# Patient Record
Sex: MALE | Race: WHITE | NOT HISPANIC OR LATINO | Employment: FULL TIME | URBAN - METROPOLITAN AREA
[De-identification: names, ages, dates, MRNs, and addresses within clinical notes are randomized per-mention and may not be internally consistent; named-entity substitution may affect disease eponyms.]

---

## 2017-01-17 ENCOUNTER — OFFICE VISIT (OUTPATIENT)
Dept: MEDICAL GROUP | Facility: CLINIC | Age: 42
End: 2017-01-17
Payer: COMMERCIAL

## 2017-01-17 VITALS
BODY MASS INDEX: 33.33 KG/M2 | TEMPERATURE: 98.5 F | OXYGEN SATURATION: 98 % | SYSTOLIC BLOOD PRESSURE: 150 MMHG | RESPIRATION RATE: 18 BRPM | HEART RATE: 86 BPM | HEIGHT: 69 IN | WEIGHT: 225 LBS | DIASTOLIC BLOOD PRESSURE: 82 MMHG

## 2017-01-17 DIAGNOSIS — Z23 NEED FOR INFLUENZA VACCINATION: ICD-10-CM

## 2017-01-17 DIAGNOSIS — E66.9 OBESITY (BMI 30-39.9): ICD-10-CM

## 2017-01-17 DIAGNOSIS — E78.5 HYPERLIPIDEMIA, UNSPECIFIED HYPERLIPIDEMIA TYPE: ICD-10-CM

## 2017-01-17 PROCEDURE — 90471 IMMUNIZATION ADMIN: CPT | Performed by: NURSE PRACTITIONER

## 2017-01-17 PROCEDURE — 90686 IIV4 VACC NO PRSV 0.5 ML IM: CPT | Performed by: NURSE PRACTITIONER

## 2017-01-17 PROCEDURE — 99214 OFFICE O/P EST MOD 30 MIN: CPT | Mod: 25 | Performed by: NURSE PRACTITIONER

## 2017-01-17 ASSESSMENT — PATIENT HEALTH QUESTIONNAIRE - PHQ9: CLINICAL INTERPRETATION OF PHQ2 SCORE: 0

## 2017-01-18 NOTE — PROGRESS NOTES
"CC: Annual Exam        HPI:     Danilo presents today for the followin. Hyperlipidemia, unspecified hyperlipidemia type  High cholesterol. No history CAD chest pains. He is overdue for labs.    2. Elevated BP  Does have elevated blood pressure today. Doesn't check it at home. Doesn't have any symptoms or concerns related to that. He has gained a little weight since his last appointment and he attributes it to that. Doesn't have excessive caffeine intake drinks one or 2 coffees maybe a day maximum.    3. Obesity (BMI 30-39.9)  Above ideal weight for height. Did gain maybe 7 pounds over the last year. Does feel his caloric intake has increased since changed jobs    4. Need for influenza vaccination      Current Outpatient Prescriptions   Medication Sig Dispense Refill   • famotidine (PEPCID) 20 MG Tab Take 20 mg by mouth 2 times a day.     • zolpidem (AMBIEN) 5 MG Tab Take 1 Tab by mouth at bedtime as needed for Sleep. 30 Tab 2   • fluticasone (FLONASE) 50 MCG/ACT nasal spray Spray 2 Sprays in nose every day. 1 Bottle 11   • Multiple Vitamins-Minerals (MULTIVITAMIN PO) Take  by mouth.       No current facility-administered medications for this visit.     Social History   Substance Use Topics   • Smoking status: Never Smoker    • Smokeless tobacco: Never Used   • Alcohol Use: 4.2 oz/week     7 Cans of beer per week     I reviewed patients allergies, problem list and medications today in EPIC.    ROS: Any/all pertinent positives listed in the HPI, otherwise all others reviewed are negative today.      /82 mmHg  Pulse 86  Temp(Src) 36.9 °C (98.5 °F)  Resp 18  Ht 1.753 m (5' 9.02\")  Wt 102.059 kg (225 lb)  BMI 33.21 kg/m2  SpO2 98%    Exam:   Gen: Alert and oriented, No apparent distress. WDWN  Psych: A+Ox3, normal affect and mood  Skin: Warm, dry and intact. Good turgor   No rashes in visible areas.  Eye: Conjunctiva clear, lids normal  ENMT: Lips without lesions, good dentition  Neck: No " Lymphadenopathy, Thyromegaly, Bruits.   Trachea midline, no masses  Lungs: Clear to auscultation bilaterally, no rales or rhonchi   Unlabored respiratory effort.   CV: Regular rate and rhythm, S1, S2. No murmurs.   No Edema  Abd: Soft non tender, non distended. Normal active bowel sounds.    No Hepatosplenomegaly, No pulsatile masses.   Ext: No clubbing, cyanosis, edema.       Assessment and Plan.   41 y.o. male with the following issues.    1. Hyperlipidemia, unspecified hyperlipidemia type  Stable. Check labs. We did have very long detailed conversation about diet  - LIPID PROFILE; Future    2. Elevated BP  Stable. Reduce salt. Increase potassium. Moderate is caffeine intake as able. He will monitor his blood pressure at home. He is given a bur a cuff. He'll follow-up in the office if it remains elevated  - COMP METABOLIC PANEL; Future  - TSH; Future  - CBC WITH DIFFERENTIAL; Future    3. Obesity (BMI 30-39.9)  Again long discussion about diet and exercise  - Patient identified as having weight management issue.  Appropriate orders and counseling given.    4. Need for influenza vaccination  I have placed the below orders and discussed them with an approved delegating provider. The MA is performing the below orders under the direction of an office MD.  -Given today.    - INFLUENZA VACCINE QUAD INJ >3Y(PF)

## 2017-03-06 RX ORDER — ZOLPIDEM TARTRATE 5 MG/1
TABLET ORAL
Qty: 30 TAB | Refills: 2 | Status: SHIPPED
Start: 2017-03-06 | End: 2017-09-06 | Stop reason: SDUPTHER

## 2017-09-08 RX ORDER — ZOLPIDEM TARTRATE 5 MG/1
TABLET ORAL
Qty: 30 TAB | Refills: 0 | Status: SHIPPED
Start: 2017-09-08 | End: 2018-09-11

## 2017-09-08 NOTE — TELEPHONE ENCOUNTER
Please call patient to notify that he has to be seen for further refills due to [facility] policy.  Last appt 01/2017     reviewed from state pharmacy database-Medications found to be medically necessary/appropriate.

## 2018-08-10 ENCOUNTER — OFFICE VISIT (OUTPATIENT)
Dept: URGENT CARE | Facility: CLINIC | Age: 43
End: 2018-08-10
Payer: COMMERCIAL

## 2018-08-10 VITALS
OXYGEN SATURATION: 96 % | WEIGHT: 221 LBS | DIASTOLIC BLOOD PRESSURE: 88 MMHG | HEART RATE: 74 BPM | SYSTOLIC BLOOD PRESSURE: 144 MMHG | BODY MASS INDEX: 32.73 KG/M2 | TEMPERATURE: 98.3 F | HEIGHT: 69 IN | RESPIRATION RATE: 14 BRPM

## 2018-08-10 DIAGNOSIS — S61.032A PUNCTURE WOUND OF LEFT THUMB, INITIAL ENCOUNTER: ICD-10-CM

## 2018-08-10 DIAGNOSIS — Z23 NEED FOR TETANUS BOOSTER: ICD-10-CM

## 2018-08-10 PROCEDURE — 99214 OFFICE O/P EST MOD 30 MIN: CPT | Mod: 25 | Performed by: NURSE PRACTITIONER

## 2018-08-10 PROCEDURE — 90715 TDAP VACCINE 7 YRS/> IM: CPT | Performed by: NURSE PRACTITIONER

## 2018-08-10 PROCEDURE — 90471 IMMUNIZATION ADMIN: CPT | Performed by: NURSE PRACTITIONER

## 2018-08-10 ASSESSMENT — ENCOUNTER SYMPTOMS
ROS SKIN COMMENTS: PUNCTURE WOUND
SENSORY CHANGE: 0
FEVER: 0
TINGLING: 0
FOCAL WEAKNESS: 0
CHILLS: 0

## 2018-08-10 NOTE — PROGRESS NOTES
"Subjective:      Danilo Sierra is a 43 y.o. male who presents with Puncture Wound (x1day, left thumb puncture wound with sunil nail)            HPI New problem. 43 year old male with left thumb wound from sunil nail puncture yesterday. He denies any pain or numbness/tingling. No active bleeding. Last [id number] ago in 2011. No other issues to report.  Patient has no known allergies.  Current Outpatient Prescriptions on File Prior to Visit   Medication Sig Dispense Refill   • Multiple Vitamins-Minerals (MULTIVITAMIN PO) Take  by mouth.     • zolpidem (AMBIEN) 5 MG Tab TAKE 1 TABLET BY MOUTH DAILY AT BEDTIME AS NEEDED FOR SLEEP 30 Tab 0   • famotidine (PEPCID) 20 MG Tab Take 20 mg by mouth 2 times a day.     • fluticasone (FLONASE) 50 MCG/ACT nasal spray Spray 2 Sprays in nose every day. 1 Bottle 11     No current facility-administered medications on file prior to visit.      Social History     Social History   • Marital status: Single     Spouse name: N/A   • Number of children: N/A   • Years of education: N/A     Occupational History   • [employer]     Social History Main Topics   • Smoking status: Never Smoker   • Smokeless tobacco: Never Used   • Alcohol use 4.2 oz/week     7 Cans of beer per week   • Drug use: No   • Sexual activity: Yes     Partners: Female      Comment: Living with girlfriend     Other Topics Concern   • Not on file     Social History Narrative   • No narrative on file     family history includes Cancer in his maternal grandmother.    Review of Systems   Constitutional: Negative for chills and fever.   Skin:        Puncture wound   Neurological: Negative for tingling, sensory change and focal weakness.          Objective:     /88   Pulse 74   Temp 36.8 °C (98.3 °F)   Resp 14   Ht 1.753 m (5' 9\")   Wt 100.2 kg (221 lb)   SpO2 96%   BMI 32.64 kg/m²      Physical Exam   Constitutional: He is oriented to person, place, and time. He appears well-developed and " well-nourished. No distress.   Cardiovascular: Normal rate, regular rhythm and normal heart sounds.    No murmur heard.  Pulmonary/Chest: Effort normal and breath sounds normal. No respiratory distress.   Musculoskeletal: Normal range of motion.   Neurological: He is alert and oriented to person, place, and time.   Skin: Skin is warm and dry.   Small wound to pad of left thumb. No active bleeding or bruising, swelling noted. Mildly tender.   Psychiatric: He has a normal mood and affect. His behavior is normal. Thought content normal.   Nursing note and vitals reviewed.              Assessment/Plan:     1. Puncture wound of left thumb, initial encounter     2. Need for tetanus booster  Tdap =>8yo IM     Differential diagnosis, natural history, supportive care, and indications for immediate follow-up discussed at length.

## 2018-09-11 ENCOUNTER — OFFICE VISIT (OUTPATIENT)
Dept: MEDICAL GROUP | Facility: MEDICAL CENTER | Age: 43
End: 2018-09-11
Payer: COMMERCIAL

## 2018-09-11 ENCOUNTER — HOSPITAL ENCOUNTER (OUTPATIENT)
Dept: LAB | Facility: MEDICAL CENTER | Age: 43
End: 2018-09-11
Attending: NURSE PRACTITIONER
Payer: COMMERCIAL

## 2018-09-11 VITALS
RESPIRATION RATE: 14 BRPM | TEMPERATURE: 98.2 F | SYSTOLIC BLOOD PRESSURE: 118 MMHG | OXYGEN SATURATION: 96 % | WEIGHT: 221 LBS | BODY MASS INDEX: 32.73 KG/M2 | DIASTOLIC BLOOD PRESSURE: 78 MMHG | HEIGHT: 69 IN | HEART RATE: 78 BPM

## 2018-09-11 DIAGNOSIS — Z23 NEED FOR VACCINATION: ICD-10-CM

## 2018-09-11 DIAGNOSIS — Z13.29 SCREENING FOR THYROID DISORDER: ICD-10-CM

## 2018-09-11 DIAGNOSIS — Z30.09 UNWANTED FERTILITY: ICD-10-CM

## 2018-09-11 DIAGNOSIS — E66.9 OBESITY (BMI 30-39.9): ICD-10-CM

## 2018-09-11 DIAGNOSIS — E78.5 HYPERLIPIDEMIA, UNSPECIFIED HYPERLIPIDEMIA TYPE: ICD-10-CM

## 2018-09-11 LAB
ALBUMIN SERPL BCP-MCNC: 4.6 G/DL (ref 3.2–4.9)
ALBUMIN/GLOB SERPL: 1.4 G/DL
ALP SERPL-CCNC: 87 U/L (ref 30–99)
ALT SERPL-CCNC: 46 U/L (ref 2–50)
ANION GAP SERPL CALC-SCNC: 7 MMOL/L (ref 0–11.9)
AST SERPL-CCNC: 31 U/L (ref 12–45)
BILIRUB SERPL-MCNC: 1.4 MG/DL (ref 0.1–1.5)
BUN SERPL-MCNC: 19 MG/DL (ref 8–22)
CALCIUM SERPL-MCNC: 10.3 MG/DL (ref 8.5–10.5)
CHLORIDE SERPL-SCNC: 103 MMOL/L (ref 96–112)
CHOLEST SERPL-MCNC: 244 MG/DL (ref 100–199)
CO2 SERPL-SCNC: 28 MMOL/L (ref 20–33)
CREAT SERPL-MCNC: 1.17 MG/DL (ref 0.5–1.4)
FASTING STATUS PATIENT QL REPORTED: NORMAL
GLOBULIN SER CALC-MCNC: 3.3 G/DL (ref 1.9–3.5)
GLUCOSE SERPL-MCNC: 96 MG/DL (ref 65–99)
HDLC SERPL-MCNC: 42 MG/DL
LDLC SERPL CALC-MCNC: 172 MG/DL
POTASSIUM SERPL-SCNC: 4.4 MMOL/L (ref 3.6–5.5)
PROT SERPL-MCNC: 7.9 G/DL (ref 6–8.2)
SODIUM SERPL-SCNC: 138 MMOL/L (ref 135–145)
TRIGL SERPL-MCNC: 150 MG/DL (ref 0–149)
TSH SERPL DL<=0.005 MIU/L-ACNC: 2.96 UIU/ML (ref 0.38–5.33)

## 2018-09-11 PROCEDURE — 80061 LIPID PANEL: CPT

## 2018-09-11 PROCEDURE — 84443 ASSAY THYROID STIM HORMONE: CPT

## 2018-09-11 PROCEDURE — 90471 IMMUNIZATION ADMIN: CPT | Performed by: NURSE PRACTITIONER

## 2018-09-11 PROCEDURE — 99396 PREV VISIT EST AGE 40-64: CPT | Mod: 25 | Performed by: NURSE PRACTITIONER

## 2018-09-11 PROCEDURE — 36415 COLL VENOUS BLD VENIPUNCTURE: CPT

## 2018-09-11 PROCEDURE — 80053 COMPREHEN METABOLIC PANEL: CPT

## 2018-09-11 PROCEDURE — 90686 IIV4 VACC NO PRSV 0.5 ML IM: CPT | Performed by: NURSE PRACTITIONER

## 2018-09-11 ASSESSMENT — PATIENT HEALTH QUESTIONNAIRE - PHQ9: CLINICAL INTERPRETATION OF PHQ2 SCORE: 0

## 2018-09-11 NOTE — PROGRESS NOTES
"CC: Sterilization (discussion /referral)        HPI:     Danilo presents today for the followin. Hyperlipidemia, unspecified hyperlipidemia type  History of some mild cholesterol issues.  No previous records on file.  It has been some years since it was checked.    2. Unwanted fertility  Desiring referral for vasectomy.    3. Obesity (BMI 30-39.9)  Weight is down    Current Outpatient Prescriptions   Medication Sig Dispense Refill   • Loratadine (CLARITIN PO) Take  by mouth.     • famotidine (PEPCID) 20 MG Tab Take 20 mg by mouth 2 times a day.     • Multiple Vitamins-Minerals (MULTIVITAMIN PO) Take  by mouth.       No current facility-administered medications for this visit.      Social History   Substance Use Topics   • Smoking status: Never Smoker   • Smokeless tobacco: Never Used   • Alcohol use 4.2 oz/week     7 Cans of beer per week     I reviewed patients allergies, problem list and medications today in EPIC.    ROS: Any/all pertinent positives listed in the HPI, otherwise all others reviewed are negative today.      /78   Pulse 78   Temp 36.8 °C (98.2 °F)   Resp 14   Ht 1.753 m (5' 9\")   Wt 100.2 kg (221 lb)   SpO2 96%   BMI 32.64 kg/m²     Exam:    Gen: Alert and oriented, No apparent distress. WDWN  Psych: A+Ox3, normal affect and mood  Skin: Warm, dry and intact. Good turgor   No rashes in visible areas.  Eye: Conjunctiva clear, lids normal  ENMT: Lips without lesions, good dentition  Neck: No Lymphadenopathy, Thyromegaly, Bruits.   Trachea midline, no masses  Lungs: Clear to auscultation bilaterally, no rales or rhonchi   Unlabored respiratory effort.   CV: Regular rate and rhythm, S1, S2. No murmurs.   No Edema  Ext: No clubbing, cyanosis, edema.       Assessment and Plan.   43 y.o. male with the following issues.    1. Hyperlipidemia, unspecified hyperlipidemia type  Monitor labs  - COMP METABOLIC PANEL; Future  - LIPID PROFILE; Future    2. Unwanted fertility  Referral placed  - " REFERRAL TO UROLOGY    3. Screening for thyroid disorder  Ordered  - TSH; Future    4. Need for vaccination  I have placed the below orders and discussed them with an approved delegating provider. The MA is performing the below orders under the direction of an office MD.  -Given today.  - INFLUENZA VACCINE QUAD INJ >3Y(PF)    5. Obesity (BMI 30-39.9)  Patient's weight is currently down  - Patient identified as having weight management issue.  Appropriate orders and counseling given.

## 2019-11-25 ENCOUNTER — OFFICE VISIT (OUTPATIENT)
Dept: MEDICAL GROUP | Facility: MEDICAL CENTER | Age: 44
End: 2019-11-25
Payer: COMMERCIAL

## 2019-11-25 VITALS
TEMPERATURE: 98.7 F | HEART RATE: 85 BPM | BODY MASS INDEX: 31.21 KG/M2 | OXYGEN SATURATION: 97 % | HEIGHT: 70 IN | SYSTOLIC BLOOD PRESSURE: 128 MMHG | RESPIRATION RATE: 16 BRPM | WEIGHT: 218 LBS | DIASTOLIC BLOOD PRESSURE: 80 MMHG

## 2019-11-25 DIAGNOSIS — E78.5 HYPERLIPIDEMIA, UNSPECIFIED HYPERLIPIDEMIA TYPE: ICD-10-CM

## 2019-11-25 DIAGNOSIS — R68.89 EAR SYMPTOM: ICD-10-CM

## 2019-11-25 DIAGNOSIS — K21.9 GASTROESOPHAGEAL REFLUX DISEASE, ESOPHAGITIS PRESENCE NOT SPECIFIED: ICD-10-CM

## 2019-11-25 DIAGNOSIS — E66.9 OBESITY (BMI 30-39.9): ICD-10-CM

## 2019-11-25 PROCEDURE — 99214 OFFICE O/P EST MOD 30 MIN: CPT | Performed by: NURSE PRACTITIONER

## 2019-11-25 RX ORDER — INFLUENZA VIRUS VACCINE 15; 15; 15; 15 UG/.5ML; UG/.5ML; UG/.5ML; UG/.5ML
SUSPENSION INTRAMUSCULAR
Refills: 0 | COMMUNITY
Start: 2019-11-20 | End: 2019-11-25

## 2019-11-25 RX ORDER — FAMOTIDINE 40 MG/1
40 TABLET, FILM COATED ORAL
Qty: 90 TAB | Refills: 1 | Status: SHIPPED | OUTPATIENT
Start: 2019-11-25 | End: 2020-03-12

## 2019-11-25 ASSESSMENT — PATIENT HEALTH QUESTIONNAIRE - PHQ9: CLINICAL INTERPRETATION OF PHQ2 SCORE: 0

## 2019-11-25 NOTE — PROGRESS NOTES
"CC: GI Problem        HPI:     Danilo presents today for the followin. Hyperlipidemia, unspecified hyperlipidemia type  Last seen about 14 or 15 months ago.  We did do some labs which show some high cholesterol with an LDL of approximately 175 at that time.  To his knowledge he does not have any family history of high cholesterol or heart disease however he admits he does not know anything about his father's side of the family as he  when he was 4.  Does eat a large amount of red meat.  Has been trying to decrease sugar from his diet.    2. Gastroesophageal reflux disease, esophagitis presence not specified  Here primarily today to discuss heartburn and GERD.  States is been bothering him and even if he \"drinks water\" he will still have discomfort and reflux.  On a rare occasional have some dysphasia typically with meat or bread will take a few minutes for it to feel like it \"releases\" and the food moves down into his stomach.  No nausea or vomiting.  Currently is doing some Prilosec over-the-counter which is working well    3. Obesity (BMI 30-39.9)  Weight is down    4.  Ear symptom  States that on rare occasions over the last 1 to 2 months he will feel like he can hear his \"heart rate\" in his right ear.  Last seconds only.  Not daily.  No other associated symptom    Current Outpatient Medications   Medication Sig Dispense Refill   • famotidine (PEPCID) 40 MG Tab Take 1 Tab by mouth every morning before breakfast. 90 Tab 1   • Loratadine (CLARITIN PO) Take  by mouth.     • Multiple Vitamins-Minerals (MULTIVITAMIN PO) Take  by mouth.       No current facility-administered medications for this visit.      Social History     Tobacco Use   • Smoking status: Never Smoker   • Smokeless tobacco: Never Used   Substance Use Topics   • Alcohol use: Yes     Alcohol/week: 4.2 oz     Types: 7 Cans of beer per week   • Drug use: No     I reviewed patients allergies, problem list and medications today in " "EPIC.    ROS: Any/all pertinent positives listed in the HPI, otherwise all others reviewed are negative today.      /80 (BP Location: Left arm)   Pulse 85   Temp 37.1 °C (98.7 °F)   Resp 16   Ht 1.778 m (5' 10\")   Wt 98.9 kg (218 lb)   SpO2 97%   BMI 31.28 kg/m²     Exam:    Gen: Alert and oriented, No apparent distress. WDWN  Psych: A+Ox3, normal affect and mood  Skin: Warm, dry and intact. Good turgor   No rashes in visible areas.  Eye: Conjunctiva clear, lids normal  ENMT: Lips without lesions, good dentition  Neck: No Lymphadenopathy, Thyromegaly, Bruits.   Trachea midline, no masses  Lungs: Clear to auscultation bilaterally, no rales or rhonchi   Unlabored respiratory effort.   CV: Regular rate and rhythm, S1, S2. No murmurs.   No Edema        Assessment and Plan.   44 y.o. male with the following issues.    1. Hyperlipidemia, unspecified hyperlipidemia type  Stable.  Patient will focus on diet for the next 2 to 3 months, labs around that time and if his LDL is still above 140 we will add in medication.  - Comp Metabolic Panel; Future  - Lipid Profile; Future    2. Gastroesophageal reflux disease, esophagitis presence not specified  Trial of Pepcid.  We did discuss foods to admit from his diet.  Discussed take the medication on empty stomach in the morning.  If not getting good improvement with this we will switch back to a PPI.  If dysphagia continues we will have him see GI for an endoscopy  - famotidine (PEPCID) 40 MG Tab; Take 1 Tab by mouth every morning before breakfast.  Dispense: 90 Tab; Refill: 1    3. Obesity (BMI 30-39.9)  Weight is down.  We discussed diet today  - OBESITY COUNSELING (No Charge): Patient identified as having weight management issue.  Appropriate orders and counseling given.      4.  Ear symptom  We will continue to monitor this.  We may consider imaging if needed    "

## 2020-03-09 ENCOUNTER — HOSPITAL ENCOUNTER (OUTPATIENT)
Dept: LAB | Facility: MEDICAL CENTER | Age: 45
End: 2020-03-09
Attending: NURSE PRACTITIONER
Payer: COMMERCIAL

## 2020-03-09 DIAGNOSIS — E78.5 HYPERLIPIDEMIA, UNSPECIFIED HYPERLIPIDEMIA TYPE: ICD-10-CM

## 2020-03-09 PROCEDURE — 80053 COMPREHEN METABOLIC PANEL: CPT

## 2020-03-09 PROCEDURE — 36415 COLL VENOUS BLD VENIPUNCTURE: CPT

## 2020-03-09 PROCEDURE — 80061 LIPID PANEL: CPT

## 2020-03-10 LAB
ALBUMIN SERPL BCP-MCNC: 4.1 G/DL (ref 3.2–4.9)
ALBUMIN/GLOB SERPL: 1.1 G/DL
ALP SERPL-CCNC: 64 U/L (ref 30–99)
ALT SERPL-CCNC: 31 U/L (ref 2–50)
ANION GAP SERPL CALC-SCNC: 9 MMOL/L (ref 0–11.9)
AST SERPL-CCNC: 22 U/L (ref 12–45)
BILIRUB SERPL-MCNC: 0.4 MG/DL (ref 0.1–1.5)
BUN SERPL-MCNC: 21 MG/DL (ref 8–22)
CALCIUM SERPL-MCNC: 9.8 MG/DL (ref 8.5–10.5)
CHLORIDE SERPL-SCNC: 108 MMOL/L (ref 96–112)
CHOLEST SERPL-MCNC: 292 MG/DL (ref 100–199)
CO2 SERPL-SCNC: 24 MMOL/L (ref 20–33)
CREAT SERPL-MCNC: 1.23 MG/DL (ref 0.5–1.4)
FASTING STATUS PATIENT QL REPORTED: NORMAL
GLOBULIN SER CALC-MCNC: 3.6 G/DL (ref 1.9–3.5)
GLUCOSE SERPL-MCNC: 109 MG/DL (ref 65–99)
HDLC SERPL-MCNC: 24 MG/DL
LDLC SERPL CALC-MCNC: 236 MG/DL
POTASSIUM SERPL-SCNC: 4.9 MMOL/L (ref 3.6–5.5)
PROT SERPL-MCNC: 7.7 G/DL (ref 6–8.2)
SODIUM SERPL-SCNC: 141 MMOL/L (ref 135–145)
TRIGL SERPL-MCNC: 160 MG/DL (ref 0–149)

## 2020-03-11 ENCOUNTER — TELEPHONE (OUTPATIENT)
Dept: MEDICAL GROUP | Facility: MEDICAL CENTER | Age: 45
End: 2020-03-11

## 2020-03-11 DIAGNOSIS — E78.5 HYPERLIPIDEMIA, UNSPECIFIED HYPERLIPIDEMIA TYPE: ICD-10-CM

## 2020-03-11 RX ORDER — ATORVASTATIN CALCIUM 20 MG/1
20 TABLET, FILM COATED ORAL DAILY
Qty: 90 TAB | Refills: 1 | Status: SHIPPED | OUTPATIENT
Start: 2020-03-11 | End: 2020-09-03

## 2020-03-11 NOTE — TELEPHONE ENCOUNTER
Please call patient.  His cholesterol did not improve and is actually significantly higher now.  I have ordered medication for him to start daily (as we had discussed at his appointment).  I also ordered a repeat lab to check for improvement with the medication in around 2-3 months.          My chart sent as well.

## 2020-03-12 RX ORDER — OMEPRAZOLE 20 MG/1
20 CAPSULE, DELAYED RELEASE ORAL 2 TIMES DAILY
Qty: 180 CAP | Refills: 1 | Status: SHIPPED | OUTPATIENT
Start: 2020-03-12 | End: 2020-09-03

## 2020-07-13 ENCOUNTER — OFFICE VISIT (OUTPATIENT)
Dept: MEDICAL GROUP | Facility: MEDICAL CENTER | Age: 45
End: 2020-07-13
Payer: COMMERCIAL

## 2020-07-13 ENCOUNTER — HOSPITAL ENCOUNTER (OUTPATIENT)
Dept: LAB | Facility: MEDICAL CENTER | Age: 45
End: 2020-07-13
Attending: NURSE PRACTITIONER
Payer: COMMERCIAL

## 2020-07-13 VITALS
HEART RATE: 74 BPM | WEIGHT: 225 LBS | BODY MASS INDEX: 32.21 KG/M2 | RESPIRATION RATE: 16 BRPM | SYSTOLIC BLOOD PRESSURE: 144 MMHG | HEIGHT: 70 IN | DIASTOLIC BLOOD PRESSURE: 70 MMHG | OXYGEN SATURATION: 97 %

## 2020-07-13 DIAGNOSIS — F51.01 PRIMARY INSOMNIA: ICD-10-CM

## 2020-07-13 DIAGNOSIS — M77.01 BILATERAL MEDIAL EPICONDYLITIS OF ELBOW JOINT: ICD-10-CM

## 2020-07-13 DIAGNOSIS — E78.5 HYPERLIPIDEMIA, UNSPECIFIED HYPERLIPIDEMIA TYPE: ICD-10-CM

## 2020-07-13 DIAGNOSIS — Z83.3 FAMILY HISTORY OF DIABETES MELLITUS (DM): ICD-10-CM

## 2020-07-13 DIAGNOSIS — R03.0 ELEVATED BLOOD PRESSURE READING: ICD-10-CM

## 2020-07-13 DIAGNOSIS — M77.02 BILATERAL MEDIAL EPICONDYLITIS OF ELBOW JOINT: ICD-10-CM

## 2020-07-13 DIAGNOSIS — R73.01 ELEVATED FASTING GLUCOSE: ICD-10-CM

## 2020-07-13 LAB
ALBUMIN SERPL BCP-MCNC: 4.6 G/DL (ref 3.2–4.9)
ALBUMIN/GLOB SERPL: 1.6 G/DL
ALP SERPL-CCNC: 98 U/L (ref 30–99)
ALT SERPL-CCNC: 82 U/L (ref 2–50)
ANION GAP SERPL CALC-SCNC: 12 MMOL/L (ref 7–16)
AST SERPL-CCNC: 112 U/L (ref 12–45)
BILIRUB SERPL-MCNC: 0.9 MG/DL (ref 0.1–1.5)
BUN SERPL-MCNC: 17 MG/DL (ref 8–22)
CALCIUM SERPL-MCNC: 9.6 MG/DL (ref 8.5–10.5)
CHLORIDE SERPL-SCNC: 103 MMOL/L (ref 96–112)
CHOLEST SERPL-MCNC: 140 MG/DL (ref 100–199)
CO2 SERPL-SCNC: 23 MMOL/L (ref 20–33)
CREAT SERPL-MCNC: 0.99 MG/DL (ref 0.5–1.4)
EST. AVERAGE GLUCOSE BLD GHB EST-MCNC: 117 MG/DL
FASTING STATUS PATIENT QL REPORTED: NORMAL
GLOBULIN SER CALC-MCNC: 2.9 G/DL (ref 1.9–3.5)
GLUCOSE SERPL-MCNC: 79 MG/DL (ref 65–99)
HBA1C MFR BLD: 5.7 % (ref 0–5.6)
HDLC SERPL-MCNC: 36 MG/DL
LDLC SERPL CALC-MCNC: 85 MG/DL
POTASSIUM SERPL-SCNC: 5.1 MMOL/L (ref 3.6–5.5)
PROT SERPL-MCNC: 7.5 G/DL (ref 6–8.2)
SODIUM SERPL-SCNC: 138 MMOL/L (ref 135–145)
TRIGL SERPL-MCNC: 96 MG/DL (ref 0–149)

## 2020-07-13 PROCEDURE — 83036 HEMOGLOBIN GLYCOSYLATED A1C: CPT

## 2020-07-13 PROCEDURE — 80061 LIPID PANEL: CPT

## 2020-07-13 PROCEDURE — 99214 OFFICE O/P EST MOD 30 MIN: CPT | Performed by: NURSE PRACTITIONER

## 2020-07-13 PROCEDURE — 80053 COMPREHEN METABOLIC PANEL: CPT

## 2020-07-13 PROCEDURE — 36415 COLL VENOUS BLD VENIPUNCTURE: CPT

## 2020-07-13 RX ORDER — NAPROXEN 500 MG/1
500 TABLET ORAL 2 TIMES DAILY WITH MEALS
Qty: 30 TAB | Refills: 1 | Status: SHIPPED | OUTPATIENT
Start: 2020-07-13 | End: 2020-07-20

## 2020-07-13 RX ORDER — TRAZODONE HYDROCHLORIDE 50 MG/1
50-100 TABLET ORAL NIGHTLY PRN
Qty: 60 TAB | Refills: 5 | Status: SHIPPED | OUTPATIENT
Start: 2020-07-13 | End: 2020-11-04 | Stop reason: SDUPTHER

## 2020-07-13 ASSESSMENT — PATIENT HEALTH QUESTIONNAIRE - PHQ9: CLINICAL INTERPRETATION OF PHQ2 SCORE: 0

## 2020-07-13 NOTE — PROGRESS NOTES
"CC: Elbow Pain        HPI:     Danilo presents today for the followin. Bilateral medial epicondylitis of elbow joint  Here complaining of may be 2 or so weeks with bilateral elbow pain.  States it is worse on the right side.  He is right-handed.  He states gripping or holding things like a \"jug of milk\" he will feel like his hand is weak and will have worsening elbow pain.  Does a lot of repetitive motions with his hands and wrist at work.    2. Hyperlipidemia, unspecified hyperlipidemia type  Is doing well on atorvastatin.  Has not had any adverse effects with the medication.  Is due for follow-up labs    3. Family history of diabetes mellitus (DM)  Maternal uncle has diabetes no one else however    4. Elevated fasting glucose  Patient had an elevated blood sugar of 109 with his fasting labs.  He does not have a strong family history of diabetes he is never had an A1c    5. Elevated blood pressure reading  Mildly elevated today at presentation however states he was running late and was lost in the building    6. Primary insomnia  Previously on Ambien 5 mg which he used intermittently \"only on the weekends\" to catch sleep.  States he falls asleep easily but will wake up and that is his biggest issue.  Has tried some over-the-counter Unisom but had issues with somnolence in the morning    Current Outpatient Medications   Medication Sig Dispense Refill   • naproxen (NAPROSYN) 500 MG Tab Take 1 Tab by mouth 2 times a day, with meals for 7 days. 30 Tab 1   • traZODone (DESYREL) 50 MG Tab Take 1-2 Tabs by mouth at bedtime as needed for Sleep. 60 Tab 5   • omeprazole (PRILOSEC) 20 MG delayed-release capsule Take 1 Cap by mouth 2 times a day. 180 Cap 1   • atorvastatin (LIPITOR) 20 MG Tab Take 1 Tab by mouth every day. 90 Tab 1   • Loratadine (CLARITIN PO) Take  by mouth.     • Multiple Vitamins-Minerals (MULTIVITAMIN PO) Take  by mouth.       No current facility-administered medications for this visit.  " "    Social History     Tobacco Use   • Smoking status: Never Smoker   • Smokeless tobacco: Never Used   Substance Use Topics   • Alcohol use: Yes     Alcohol/week: 4.2 oz     Types: 7 Cans of beer per week   • Drug use: No     I reviewed patients allergies, problem list and medications today in [facility].    ROS: Any/all pertinent positives listed in the HPI, otherwise all others reviewed are negative today.      /70 (BP Location: Right arm, Patient Position: Sitting, BP Cuff Size: Adult)   Pulse 74   Resp 16   Ht 1.778 m (5' 10\")   Wt 102.1 kg (225 lb)   SpO2 97%   BMI 32.28 kg/m²     Exam:    Gen: Alert and oriented, No apparent distress. WDWN  Psych: A+Ox3, normal affect and mood  Skin: Warm, dry and intact. Good turgor   No rashes in visible areas.  Eye: Conjunctiva clear, lids normal  ENMT: Masked  Lungs: Clear to auscultation bilaterally, no rales or rhonchi   Unlabored respiratory effort.   CV: Regular rate and rhythm, S1, S2. No murmurs.   No Edema  Reproducible discomfort with palpation over the right lateral epicondyles.  No gross formed erythema edema.  Normal range of motion.  Mildly reproducible discomfort with resisted extension of the right hand/wrist.      Assessment and Plan.   45 y.o. male with the following issues.    1. Bilateral medial epicondylitis of elbow joint  We did review home care for this.  We did review anti-inflammatories.  He has no problems with anti-inflammatory use in the past.  Reviewed to take twice daily always with food and for up to 7 days.  After that he can use intermittently.  We did review supportive braces that he can use he was given information on epicondylitis printed from up-to-date.  Discussed if not improving let me know and we can do a referral to Ortho or sports therapy  - naproxen (NAPROSYN) 500 MG Tab; Take 1 Tab by mouth 2 times a day, with meals for 7 days.  Dispense: 30 Tab; Refill: 1    2. Hyperlipidemia, unspecified hyperlipidemia type  Continue " statin, pending lab    3. Family history of diabetes mellitus (DM)/ Elevated fasting glucose  Pending lab  - HEMOGLOBIN A1C; Future    5. Elevated blood pressure reading  We will continue to monitor    6. Kassidy gandhi insomnia  Trial of trazodone which may work better than Ambien given he falls asleep easily if his problem is staying asleep.  If he has any issues with lingering sleepiness let me know.  We always have the option to go back to Ambien however patient myself agree it would be more ideal to use something that is not habit forming over time.  - traZODone (DESYREL) 50 MG Tab; Take 1-2 Tabs by mouth at bedtime as needed for Sleep.  Dispense: 60 Tab; Refill: 5

## 2020-07-14 ENCOUNTER — TELEPHONE (OUTPATIENT)
Dept: MEDICAL GROUP | Facility: MEDICAL CENTER | Age: 45
End: 2020-07-14

## 2020-07-14 DIAGNOSIS — R79.89 LFT ELEVATION: ICD-10-CM

## 2020-07-14 NOTE — TELEPHONE ENCOUNTER
"pls ensure patient has received information from the [employer] MD message sent earlier today and copied below.    \"Danilo,     Great news you cholesterol is significantly improved and now in good range.    Blood sugar tests are now normal fasting however the 3 month average (hemoglobin A1c we were talking about) shows extremely mild sugar sensitivity or prediabetes.  This is nowhere near close to anything concerning for diabetes.  It is something we will watch routinely however.  I do recommend trying eliminate sweets and simple white carbs in your diet to some extent.  Otherwise there is no other change needed.    Interestingly a couple of your liver tests are slightly elevated.  I recommend eliminating alcohol.  Your cholesterol medication could have something to do with this and so therefore I will place a lab (that will be nonfasting) to recheck this in 2 to 3 weeks to see if there is been any change.    That lab will be ordered and pending to be done in the lab around that time.  Again nonfasting.  For now would like you to continue on her cholesterol medication until we see that next lab\"      Hepatic panel pending  "

## 2020-09-03 DIAGNOSIS — E78.5 HYPERLIPIDEMIA, UNSPECIFIED HYPERLIPIDEMIA TYPE: ICD-10-CM

## 2020-09-03 RX ORDER — OMEPRAZOLE 20 MG/1
CAPSULE, DELAYED RELEASE ORAL
Qty: 180 CAP | Refills: 0 | Status: SHIPPED | OUTPATIENT
Start: 2020-09-03 | End: 2020-11-17 | Stop reason: SDUPTHER

## 2020-09-03 RX ORDER — ATORVASTATIN CALCIUM 20 MG/1
TABLET, FILM COATED ORAL
Qty: 90 TAB | Refills: 0 | Status: SHIPPED | OUTPATIENT
Start: 2020-09-03 | End: 2020-11-17 | Stop reason: SDUPTHER

## 2020-11-04 DIAGNOSIS — F51.01 PRIMARY INSOMNIA: ICD-10-CM

## 2020-11-05 RX ORDER — TRAZODONE HYDROCHLORIDE 50 MG/1
50-100 TABLET ORAL NIGHTLY PRN
Qty: 60 TAB | Refills: 0 | Status: SHIPPED | OUTPATIENT
Start: 2020-11-05 | End: 2020-11-17 | Stop reason: SDUPTHER

## 2020-11-17 ENCOUNTER — TELEMEDICINE (OUTPATIENT)
Dept: MEDICAL GROUP | Facility: MEDICAL CENTER | Age: 45
End: 2020-11-17
Payer: COMMERCIAL

## 2020-11-17 VITALS — BODY MASS INDEX: 32.28 KG/M2 | HEIGHT: 70 IN

## 2020-11-17 DIAGNOSIS — R73.03 PREDIABETES: ICD-10-CM

## 2020-11-17 DIAGNOSIS — R73.01 ELEVATED FASTING GLUCOSE: ICD-10-CM

## 2020-11-17 DIAGNOSIS — G47.00 INSOMNIA, UNSPECIFIED TYPE: ICD-10-CM

## 2020-11-17 DIAGNOSIS — Z00.00 ENCOUNTER FOR PREVENTIVE CARE: ICD-10-CM

## 2020-11-17 DIAGNOSIS — K21.9 GASTROESOPHAGEAL REFLUX DISEASE WITHOUT ESOPHAGITIS: ICD-10-CM

## 2020-11-17 DIAGNOSIS — E78.5 HYPERLIPIDEMIA, UNSPECIFIED HYPERLIPIDEMIA TYPE: ICD-10-CM

## 2020-11-17 DIAGNOSIS — F51.01 PRIMARY INSOMNIA: ICD-10-CM

## 2020-11-17 DIAGNOSIS — R79.89 LFT ELEVATION: ICD-10-CM

## 2020-11-17 PROCEDURE — 99204 OFFICE O/P NEW MOD 45 MIN: CPT | Performed by: INTERNAL MEDICINE

## 2020-11-17 RX ORDER — OMEPRAZOLE 20 MG/1
20 CAPSULE, DELAYED RELEASE ORAL DAILY
Qty: 90 CAP | Refills: 2 | Status: SHIPPED | OUTPATIENT
Start: 2020-11-17 | End: 2020-12-21

## 2020-11-17 RX ORDER — ATORVASTATIN CALCIUM 20 MG/1
20 TABLET, FILM COATED ORAL
Qty: 90 TAB | Refills: 2 | Status: SHIPPED | OUTPATIENT
Start: 2020-11-17 | End: 2021-09-07

## 2020-11-17 RX ORDER — TRAZODONE HYDROCHLORIDE 50 MG/1
50-100 TABLET ORAL NIGHTLY PRN
Qty: 60 TAB | Refills: 1 | Status: SHIPPED | OUTPATIENT
Start: 2020-11-17 | End: 2020-12-23

## 2020-11-17 NOTE — ASSESSMENT & PLAN NOTE
Ref. Range 7/13/2020 09:49   Glycohemoglobin Latest Ref Range: 0.0 - 5.6 % 5.7 (H)   Estim. Avg Glu Latest Units: mg/dL 117

## 2020-11-17 NOTE — PROGRESS NOTES
Virtual Visit: New Patient   This visit was conducted via Zoom using secure and encrypted videoconferencing technology. The patient was in a private location in the state of [place].    The patient's identity was confirmed and verbal consent was obtained for this virtual visit.    Subjective:     CC:   Chief Complaint   Patient presents with   • Establish Care       Danilo Sierra is a 45 y.o. male presenting to establish care and to discuss the evaluation and management of:    LFT elevation  Elevated ALT/AST  Per patient he cut down on drinking significantly  Will recheck liver function, if still elevated will do further work up      Insomnia  Stable on trazodone prn  Sleep hygiene      Prediabetes     Ref. Range 7/13/2020 09:49   Glycohemoglobin Latest Ref Range: 0.0 - 5.6 % 5.7 (H)   Estim. Avg Glu Latest Units: mg/dL 117         Hyperlipidemia  Improving on statin  Lifestyle modifications        Gastroesophageal reflux disease without esophagitis  Stable on omeprazole        ROS  See HPI  Constitutional: Negative for fever, chills and malaise/fatigue.   HENT: Negative for congestion.    Eyes: Negative for pain.   Respiratory: Negative for cough and shortness of breath.    Cardiovascular: Negative for leg swelling.   Gastrointestinal: Negative for nausea, vomiting, abdominal pain and diarrhea.   Genitourinary: Negative for dysuria and hematuria.   Skin: Negative for rash.   Neurological: Negative for dizziness, focal weakness and headaches.   Endo/Heme/Allergies: Does not bruise/bleed easily.   Psychiatric/Behavioral: Negative for depression.  The patient is not nervous/anxious.      No Known Allergies    Current medicines (including changes today)  Current Outpatient Medications   Medication Sig Dispense Refill   • atorvastatin (LIPITOR) 20 MG Tab Take 1 Tab by mouth every day. 90 Tab 2   • omeprazole (PRILOSEC) 20 MG delayed-release capsule Take 1 Cap by mouth every day. 90 Cap 2   • traZODone  "(DESYREL) 50 MG Tab Take 1-2 Tabs by mouth at bedtime as needed for Sleep. 60 Tab 1   • Loratadine (CLARITIN PO) Take  by mouth.     • Multiple Vitamins-Minerals (MULTIVITAMIN PO) Take  by mouth.       No current facility-administered medications for this visit.        He  has a past medical history of Allergic rhinitis (2015), GERD (gastroesophageal reflux disease), Hyperlipidemia, and Insomnia (2012). He also has no past medical history of Anxiety, Arrhythmia, Asthma, Blood transfusion without reported diagnosis, Cancer (HCC), CHF (congestive heart failure) (HCC), Clotting disorder (HCC), COPD (chronic obstructive pulmonary disease) (HCC), Depression, Diabetes, Diabetic neuropathy (HCC), Goiter, Heart attack (HCC), Heart murmur, Hypertension, Kidney disease, Migraine, Pulmonary emphysema (HCC), Seizure (HCC), Stroke (HCC), or Thyroid disease.  He  has a past surgical history that includes dental extraction(s).      Family History   Problem Relation Age of Onset   • Hyperlipidemia Mother    • Kidney Disease Mother    • Cancer Maternal Grandmother         lung CA/smoker   • Cancer Maternal Grandfather         colon   • Diabetes Maternal Uncle      Family Status   Relation Name Status   • PGMo unk Alive   • Mo  Alive   • Fa   at age 29        Accident - MVA   • Bro  Alive   • MGMo     • MGFa     • PGFa unk Alive   • MUnc  Alive       Patient Active Problem List    Diagnosis Date Noted   • Hyperlipidemia 2016     Priority: Medium   • Gastroesophageal reflux disease without esophagitis 2016     Priority: Medium   • Obesity (BMI 30-39.9) 2016     Priority: Low   • Allergic rhinitis 2015     Priority: Low   • Insomnia 2012     Priority: Low   • LFT elevation 2020   • Family history of diabetes mellitus (DM) 2020   • Prediabetes 2020   • Bilateral medial epicondylitis of elbow joint 2020          Objective:   Ht 1.778 m (5' 10\")   BMI " 32.28 kg/m²     Physical Exam:  Constitutional: Alert, no distress, well-groomed.  Skin: No rashes in visible areas.  Eye: Round. Conjunctiva clear, lids normal. No icterus.   ENMT: Lips pink without lesions, good dentition, moist mucous membranes. Phonation normal.  Neck: No masses, no thyromegaly. Moves freely without pain.  Respiratory: Unlabored respiratory effort, no cough or audible wheeze  Psych: Alert and oriented x3, normal affect and mood.       Assessment and Plan:   The following treatment plan was discussed:     LFT elevation  - Comp Metabolic Panel; Future  Patient cut down on ETOH  Will monitor LFT if still elevated will do more work up     Hyperlipidemia, unspecified hyperlipidemia type  - Lipid Profile; Future  - TSH WITH REFLEX TO FT4; Future  - atorvastatin (LIPITOR) 20 MG Tab; Take 1 Tab by mouth every day.  Dispense: 90 Tab; Refill: 2  Lifestyle modifications     Encounter for preventive care  - CBC WITH DIFFERENTIAL; Future  - VITAMIN D,25 HYDROXY; Future     Prediabetes  - HEMOGLOBIN A1C; Future  Lifestyle modifications    Gastroesophageal reflux disease without esophagitis  - omeprazole (PRILOSEC) 20 MG delayed-release capsule; Take 1 Cap by mouth every day.  Dispense: 90 Cap; Refill: 2     Primary insomnia  - traZODone (DESYREL) 50 MG Tab; Take 1-2 Tabs by mouth at bedtime as needed for Sleep.  Dispense: 60 Tab; Refill: 1  Sleep hygiene    Follow-up: Return if symptoms worsen or fail to improve.

## 2020-11-17 NOTE — ASSESSMENT & PLAN NOTE
Elevated ALT/AST  Per patient he cut down on drinking significantly  Will recheck liver function, if still elevated will do further work up

## 2020-12-14 ENCOUNTER — HOSPITAL ENCOUNTER (OUTPATIENT)
Dept: LAB | Facility: MEDICAL CENTER | Age: 45
End: 2020-12-14
Attending: NURSE PRACTITIONER
Payer: COMMERCIAL

## 2020-12-14 ENCOUNTER — HOSPITAL ENCOUNTER (OUTPATIENT)
Dept: LAB | Facility: MEDICAL CENTER | Age: 45
End: 2020-12-14
Attending: INTERNAL MEDICINE
Payer: COMMERCIAL

## 2020-12-14 DIAGNOSIS — E78.5 HYPERLIPIDEMIA, UNSPECIFIED HYPERLIPIDEMIA TYPE: ICD-10-CM

## 2020-12-14 DIAGNOSIS — R79.89 LFT ELEVATION: ICD-10-CM

## 2020-12-14 DIAGNOSIS — Z00.00 ENCOUNTER FOR PREVENTIVE CARE: ICD-10-CM

## 2020-12-14 DIAGNOSIS — R73.03 PREDIABETES: ICD-10-CM

## 2020-12-14 LAB
25(OH)D3 SERPL-MCNC: 30 NG/ML (ref 30–100)
ALBUMIN SERPL BCP-MCNC: 4.5 G/DL (ref 3.2–4.9)
ALBUMIN SERPL BCP-MCNC: 4.5 G/DL (ref 3.2–4.9)
ALBUMIN/GLOB SERPL: 1.5 G/DL
ALP SERPL-CCNC: 93 U/L (ref 30–99)
ALP SERPL-CCNC: 94 U/L (ref 30–99)
ALT SERPL-CCNC: 54 U/L (ref 2–50)
ALT SERPL-CCNC: 56 U/L (ref 2–50)
ANION GAP SERPL CALC-SCNC: 7 MMOL/L (ref 7–16)
AST SERPL-CCNC: 26 U/L (ref 12–45)
AST SERPL-CCNC: 28 U/L (ref 12–45)
BASOPHILS # BLD AUTO: 0.8 % (ref 0–1.8)
BASOPHILS # BLD: 0.06 K/UL (ref 0–0.12)
BILIRUB CONJ SERPL-MCNC: <0.2 MG/DL (ref 0.1–0.5)
BILIRUB INDIRECT SERPL-MCNC: ABNORMAL MG/DL (ref 0–1)
BILIRUB SERPL-MCNC: 0.7 MG/DL (ref 0.1–1.5)
BILIRUB SERPL-MCNC: 0.7 MG/DL (ref 0.1–1.5)
BUN SERPL-MCNC: 20 MG/DL (ref 8–22)
CALCIUM SERPL-MCNC: 9.6 MG/DL (ref 8.5–10.5)
CHLORIDE SERPL-SCNC: 104 MMOL/L (ref 96–112)
CHOLEST SERPL-MCNC: 172 MG/DL (ref 100–199)
CO2 SERPL-SCNC: 26 MMOL/L (ref 20–33)
CREAT SERPL-MCNC: 1.03 MG/DL (ref 0.5–1.4)
EOSINOPHIL # BLD AUTO: 0.24 K/UL (ref 0–0.51)
EOSINOPHIL NFR BLD: 3.1 % (ref 0–6.9)
ERYTHROCYTE [DISTWIDTH] IN BLOOD BY AUTOMATED COUNT: 40.2 FL (ref 35.9–50)
EST. AVERAGE GLUCOSE BLD GHB EST-MCNC: 114 MG/DL
FASTING STATUS PATIENT QL REPORTED: NORMAL
GLOBULIN SER CALC-MCNC: 3 G/DL (ref 1.9–3.5)
GLUCOSE SERPL-MCNC: 104 MG/DL (ref 65–99)
HBA1C MFR BLD: 5.6 % (ref 0–5.6)
HCT VFR BLD AUTO: 47.4 % (ref 42–52)
HDLC SERPL-MCNC: 35 MG/DL
HGB BLD-MCNC: 15.3 G/DL (ref 14–18)
IMM GRANULOCYTES # BLD AUTO: 0.02 K/UL (ref 0–0.11)
IMM GRANULOCYTES NFR BLD AUTO: 0.3 % (ref 0–0.9)
LDLC SERPL CALC-MCNC: 107 MG/DL
LYMPHOCYTES # BLD AUTO: 1.67 K/UL (ref 1–4.8)
LYMPHOCYTES NFR BLD: 21.7 % (ref 22–41)
MCH RBC QN AUTO: 27.9 PG (ref 27–33)
MCHC RBC AUTO-ENTMCNC: 32.3 G/DL (ref 33.7–35.3)
MCV RBC AUTO: 86.5 FL (ref 81.4–97.8)
MONOCYTES # BLD AUTO: 0.58 K/UL (ref 0–0.85)
MONOCYTES NFR BLD AUTO: 7.5 % (ref 0–13.4)
NEUTROPHILS # BLD AUTO: 5.12 K/UL (ref 1.82–7.42)
NEUTROPHILS NFR BLD: 66.6 % (ref 44–72)
NRBC # BLD AUTO: 0 K/UL
NRBC BLD-RTO: 0 /100 WBC
PLATELET # BLD AUTO: 353 K/UL (ref 164–446)
PMV BLD AUTO: 10.5 FL (ref 9–12.9)
POTASSIUM SERPL-SCNC: 4.3 MMOL/L (ref 3.6–5.5)
PROT SERPL-MCNC: 7.5 G/DL (ref 6–8.2)
PROT SERPL-MCNC: 7.6 G/DL (ref 6–8.2)
RBC # BLD AUTO: 5.48 M/UL (ref 4.7–6.1)
SODIUM SERPL-SCNC: 137 MMOL/L (ref 135–145)
TRIGL SERPL-MCNC: 150 MG/DL (ref 0–149)
TSH SERPL DL<=0.005 MIU/L-ACNC: 3.85 UIU/ML (ref 0.38–5.33)
WBC # BLD AUTO: 7.7 K/UL (ref 4.8–10.8)

## 2020-12-14 PROCEDURE — 80076 HEPATIC FUNCTION PANEL: CPT

## 2020-12-14 PROCEDURE — 85025 COMPLETE CBC W/AUTO DIFF WBC: CPT

## 2020-12-14 PROCEDURE — 80053 COMPREHEN METABOLIC PANEL: CPT

## 2020-12-14 PROCEDURE — 36415 COLL VENOUS BLD VENIPUNCTURE: CPT

## 2020-12-14 PROCEDURE — 84443 ASSAY THYROID STIM HORMONE: CPT

## 2020-12-14 PROCEDURE — 80061 LIPID PANEL: CPT

## 2020-12-14 PROCEDURE — 83036 HEMOGLOBIN GLYCOSYLATED A1C: CPT

## 2020-12-14 PROCEDURE — 82306 VITAMIN D 25 HYDROXY: CPT

## 2020-12-16 ENCOUNTER — TELEPHONE (OUTPATIENT)
Dept: MEDICAL GROUP | Facility: MEDICAL CENTER | Age: 45
End: 2020-12-16

## 2020-12-21 DIAGNOSIS — K21.9 GASTROESOPHAGEAL REFLUX DISEASE WITHOUT ESOPHAGITIS: ICD-10-CM

## 2020-12-21 RX ORDER — OMEPRAZOLE 20 MG/1
CAPSULE, DELAYED RELEASE ORAL
Qty: 180 CAP | Refills: 1 | Status: SHIPPED | OUTPATIENT
Start: 2020-12-21 | End: 2021-09-07

## 2020-12-23 DIAGNOSIS — F51.01 PRIMARY INSOMNIA: ICD-10-CM

## 2020-12-23 RX ORDER — TRAZODONE HYDROCHLORIDE 50 MG/1
50-100 TABLET ORAL NIGHTLY PRN
Qty: 60 TAB | Refills: 1 | Status: SHIPPED | OUTPATIENT
Start: 2020-12-23 | End: 2021-01-27

## 2021-01-27 DIAGNOSIS — F51.01 PRIMARY INSOMNIA: ICD-10-CM

## 2021-01-27 RX ORDER — TRAZODONE HYDROCHLORIDE 50 MG/1
50-100 TABLET ORAL NIGHTLY PRN
Qty: 60 TAB | Refills: 1 | Status: SHIPPED | OUTPATIENT
Start: 2021-01-27 | End: 2021-03-03

## 2021-03-03 DIAGNOSIS — F51.01 PRIMARY INSOMNIA: ICD-10-CM

## 2021-03-03 RX ORDER — TRAZODONE HYDROCHLORIDE 50 MG/1
50 TABLET ORAL NIGHTLY PRN
Qty: 60 TABLET | Refills: 2 | Status: SHIPPED | OUTPATIENT
Start: 2021-03-03 | End: 2021-04-02

## 2021-07-06 ENCOUNTER — OFFICE VISIT (OUTPATIENT)
Dept: MEDICAL GROUP | Facility: MEDICAL CENTER | Age: 46
End: 2021-07-06
Payer: COMMERCIAL

## 2021-07-06 VITALS
SYSTOLIC BLOOD PRESSURE: 120 MMHG | TEMPERATURE: 97.9 F | WEIGHT: 209.44 LBS | HEART RATE: 63 BPM | OXYGEN SATURATION: 96 % | HEIGHT: 71 IN | BODY MASS INDEX: 29.32 KG/M2 | DIASTOLIC BLOOD PRESSURE: 80 MMHG | RESPIRATION RATE: 16 BRPM

## 2021-07-06 DIAGNOSIS — Z91.89 AT RISK FOR SLEEP APNEA: ICD-10-CM

## 2021-07-06 DIAGNOSIS — G47.00 INSOMNIA, UNSPECIFIED TYPE: ICD-10-CM

## 2021-07-06 DIAGNOSIS — Z30.09 VASECTOMY EVALUATION: ICD-10-CM

## 2021-07-06 DIAGNOSIS — R79.89 LFT ELEVATION: ICD-10-CM

## 2021-07-06 DIAGNOSIS — G25.81 RESTLESS LEG: ICD-10-CM

## 2021-07-06 DIAGNOSIS — R73.03 PREDIABETES: ICD-10-CM

## 2021-07-06 DIAGNOSIS — E78.5 HYPERLIPIDEMIA, UNSPECIFIED HYPERLIPIDEMIA TYPE: ICD-10-CM

## 2021-07-06 DIAGNOSIS — Z00.00 ENCOUNTER FOR PREVENTIVE CARE: ICD-10-CM

## 2021-07-06 DIAGNOSIS — F32.A MILD DEPRESSION: ICD-10-CM

## 2021-07-06 PROCEDURE — 99214 OFFICE O/P EST MOD 30 MIN: CPT | Performed by: INTERNAL MEDICINE

## 2021-07-06 RX ORDER — TRAZODONE HYDROCHLORIDE 50 MG/1
50-100 TABLET ORAL
Qty: 90 TABLET | Refills: 1 | Status: SHIPPED | OUTPATIENT
Start: 2021-07-06 | End: 2021-09-07

## 2021-07-06 RX ORDER — TRAZODONE HYDROCHLORIDE 50 MG/1
1-2 TABLET ORAL
COMMUNITY
Start: 2021-06-02 | End: 2021-07-06 | Stop reason: SDUPTHER

## 2021-07-06 ASSESSMENT — ANXIETY QUESTIONNAIRES
1. FEELING NERVOUS, ANXIOUS, OR ON EDGE: NOT AT ALL
GAD7 TOTAL SCORE: 4
2. NOT BEING ABLE TO STOP OR CONTROL WORRYING: SEVERAL DAYS
3. WORRYING TOO MUCH ABOUT DIFFERENT THINGS: NOT AT ALL
5. BEING SO RESTLESS THAT IT IS HARD TO SIT STILL: NOT AT ALL
4. TROUBLE RELAXING: MORE THAN HALF THE DAYS
7. FEELING AFRAID AS IF SOMETHING AWFUL MIGHT HAPPEN: SEVERAL DAYS
6. BECOMING EASILY ANNOYED OR IRRITABLE: NOT AT ALL

## 2021-07-06 ASSESSMENT — PATIENT HEALTH QUESTIONNAIRE - PHQ9
SUM OF ALL RESPONSES TO PHQ QUESTIONS 1-9: 7
CLINICAL INTERPRETATION OF PHQ2 SCORE: 1
5. POOR APPETITE OR OVEREATING: 1 - SEVERAL DAYS

## 2021-07-06 ASSESSMENT — FIBROSIS 4 INDEX: FIB4 SCORE: 0.5

## 2021-07-06 NOTE — PATIENT INSTRUCTIONS
"- Recommend Cognitive Behavioral Therapy for Insomnia, relaxation techniques, mindfulness.       - Referral to sleep medicine    Good sleep hygiene:  --Keep a regular sleep schedule. Going to bed at same time and waking up at same time (even on weekends).   --Go to bed only when sleepy. Sleep only as much as you need to feel rested and then get out of bed  --Bed for sleep and sex only.  Do not read or watch TV in bed.  --Avoid TV, computer/tablet screens for 2 hours prior to bedtime or wear blue light blocking sunglasses. Avoid prolonged use of light-emitting screens before bedtime.  --Dark, cool, quiet bedroom. \"White noise\" (ie from a fan) may be helpful.  --No caffeinated beverages after lunch; do not use alcohol for sleep. Avoid alcohol near bedtime.  --Do not go to bed hungry  -- Do not take a nap during the day.  --Exercise regularly, preferably at least 4 to 5 hours before bedtime  --Make the bedroom environment conducive to sleep  --Can try melatonin, which are both over the counter.  -- Avoid smoking, especially in the evening  --Deal with your worries before bedtime  -- Get out of bed if unable to fall asleep within 20 minutes and go to another room. Return to bed only when sleepy. Repeat this step as many times as necessary throughout the night.  Stimulus control therapy rule    Relaxation -- Relaxation therapy involves progressively relaxing your muscles from your head down to your feet. Here is a sample of a relaxation program: Beginning with the muscles in your face, squeeze (contract) your muscles gently for one to two seconds and then relax. Repeat several times. Use the same technique for other muscle groups, usually in the following sequence: jaw and neck, shoulders, upper arms, lower arms, fingers, chest, abdomen, buttocks, thighs, calves, and feet. Repeat this cycle for 45 minutes, if necessary. This relaxation program can promote restfulness and sleep.      Restless Legs Syndrome  Restless legs " syndrome is a condition that causes uncomfortable feelings or sensations in the legs, especially while sitting or lying down. The sensations usually cause an overwhelming urge to move the legs. The arms can also sometimes be affected.  The condition can range from mild to severe. The symptoms often interfere with a person's ability to sleep.  What are the causes?  The cause of this condition is not known.  What increases the risk?  The following factors may make you more likely to develop this condition:  · Being older than 50.  · Pregnancy.  · Being a woman. In general, the condition is more common in women than in men.  · A family history of the condition.  · Having iron deficiency.  · Overuse of caffeine, nicotine, or alcohol.  · Certain medical conditions, such as kidney disease, Parkinson's disease, or nerve damage.  · Certain medicines, such as those for high blood pressure, nausea, colds, allergies, depression, and some heart conditions.  What are the signs or symptoms?  The main symptom of this condition is uncomfortable sensations in the legs, such as:  · Pulling.  · Tingling.  · Prickling.  · Throbbing.  · Crawling.  · Burning.  Usually, the sensations:  · Affect both sides of the body.  · Are worse when you sit or lie down.  · Are worse at night. These may wake you up or make it difficult to fall asleep.  · Make you have a strong urge to move your legs.  · Are temporarily relieved by moving your legs.  The arms can also be affected, but this is rare. People who have this condition often have tiredness during the day because of their lack of sleep at night.  How is this diagnosed?  This condition may be diagnosed based on:  · Your symptoms.  · Blood tests.  In some cases, you may be monitored in a sleep lab by a specialist (a sleep study). This can detect any disruptions in your sleep.  How is this treated?  This condition is treated by managing the symptoms. This may include:  · Lifestyle changes, such as  exercising, using relaxation techniques, and avoiding caffeine, alcohol, or tobacco.  · Medicines. Anti-seizure medicines may be tried first.  Follow these instructions at home:         General instructions  · Take over-the-counter and prescription medicines only as told by your health care provider.  · Use methods to help relieve the uncomfortable sensations, such as:  ? Massaging your legs.  ? Walking or stretching.  ? Taking a cold or hot bath.  · Keep all follow-up visits as told by your health care provider. This is important.  Lifestyle  · Practice good sleep habits. For example, go to bed and get up at the same time every day. Most adults should get 7-9 hours of sleep each night.  · Exercise regularly. Try to get at least 30 minutes of exercise most days of the week.  · Practice ways of relaxing, such as yoga or meditation.  · Avoid caffeine and alcohol.  · Do not use any products that contain nicotine or tobacco, such as cigarettes and e-cigarettes. If you need help quitting, ask your health care provider.  Contact a health care provider if:  · Your symptoms get worse or they do not improve with treatment.  Summary  · Restless legs syndrome is a condition that causes uncomfortable feelings or sensations in the legs, especially while sitting or lying down.  · The symptoms often interfere with a person's ability to sleep.  · This condition is treated by managing the symptoms. You may need to make lifestyle changes or take medicines.  This information is not intended to replace advice given to you by your health care provider. Make sure you discuss any questions you have with your health care provider.  Document Released: 12/08/2003 Document Revised: 01/07/2019 Document Reviewed: 01/07/2019  Elsevier Patient Education © 2020 [employer]      Behavioral strategies -- Use of the following interventions is supported primarily on the basis of clinical experience and, in some cases, small randomized trials  [5,18]:  ?Mental alerting activities, such as working on a computer or doing crossword puzzles, at times of rest or boredom  ?Avoidance of aggravating factors, including consideration of withdrawal of possibly predisposing medications (see 'Avoidance of aggravating factors' below)  ?Moderate regular exercise [19]  ?Reduced caffeine intake  ?For symptomatic relief - walking, bicycling, soaking the affected limbs, and leg massage, including pneumatic compression

## 2021-07-06 NOTE — ASSESSMENT & PLAN NOTE
Uncontrolled  Wake up in the middle of night, turns a lot  Anxiety during bedtime  Trial trazodone prn  Sleep hygiene discussed

## 2021-07-06 NOTE — ASSESSMENT & PLAN NOTE
Nocturnal rest leg  Healthful lifestyle measures discussed  He does not want to start medication yet    Behavioral strategies -- Use of the following interventions is supported primarily on the basis of clinical experience and, in some cases, small randomized trials  ?Mental alerting activities, such as working on a computer or doing crossword puzzles, at times of rest or boredom  ?Avoidance of aggravating factors, including consideration of withdrawal of possibly predisposing medications  ?Moderate regular exercise   ?Reduced caffeine intake  ?For symptomatic relief - walking, bicycling, soaking the affected limbs, and leg massage, including pneumatic compression

## 2021-07-06 NOTE — PROGRESS NOTES
Established Patient    Danilo Sierra is a 46 y.o. male who presents today with the following:    CC:   Chief Complaint   Patient presents with   • Follow-Up   • Difficulty Sleeping     can't fall asleep or stay asleep   • Other     restless legs at [facility]   • Referral Needed       HPI:     He had vasectomy before and would like to to be checked by urology.    At risk for sleep apnea  Snore loud, daytime fatigue, Body mass index is 29.32 kg/m².  Neck circum 43 cm      Insomnia  Uncontrolled  Wake up in the middle of night, turns a lot  Anxiety during bedtime  Trial trazodone prn  Sleep hygiene discussed      LFT elevation  Elevated ALT/AST  Per patient he cut down on drinking significantly        Hyperlipidemia  Healthful lifestyle measures          Mild depression (HCC)  Mild depression  Denies SI/HI  Ok for talking therapy      Restless leg  Nocturnal rest leg  Healthful lifestyle measures discussed  He does not want to start medication yet    Behavioral strategies -- Use of the following interventions is supported primarily on the basis of clinical experience and, in some cases, small randomized trials  ?Mental alerting activities, such as working on a computer or doing crossword puzzles, at times of rest or boredom  ?Avoidance of aggravating factors, including consideration of withdrawal of possibly predisposing medications  ?Moderate regular exercise   ?Reduced caffeine intake  ?For symptomatic relief - walking, bicycling, soaking the affected limbs, and leg massage, including pneumatic compression        Depression Screening    Little interest or pleasure in doing things?  0 - not at all   Feeling down, depressed , or hopeless? 1 - several days   Trouble falling or staying asleep, or sleeping too much?  2 - more than half the days   Feeling tired or having little energy?  1 - several days   Poor appetite or overeating?  1 - several days   Feeling bad about yourself - or that you are a failure or have  let yourself or your family down? 1 - several days   Trouble concentrating on things, such as reading the newspaper or watching television? 0 - not at all   Moving or speaking so slowly that other people could have noticed.  Or the opposite - being so fidgety or restless that you have been moving around a lot more than usual?  1 - several days   Thoughts that you would be better off dead, or of hurting yourself?  0 - not at all   Patient Health Questionnaire Score: 7       If depressive symptoms identified deferred to follow up visit unless specifically addressed in assesment and plan.    Interpretation of PHQ-9 Total Score   Score Severity   1-4 No Depression   5-9 Mild Depression   10-14 Moderate Depression   15-19 Moderately Severe Depression   20-27 Severe Depression    JACINTO-7 Questionnaire    Feeling nervous, anxious, or on edge: Not at all  Not being able to sop or control worrying: Several days  Worrying too much about different things: Not at all  Trouble relaxing: More than half the days  Being so restless that it's hard to sit still: Not at all  Becoming easily annoyed or irritable: Not at all  Feeling afraid as if something awful might happen: Several days  Total: 4    Interpretation of JACINTO 7 Total Score   Score Severity :  0-4 No Anxiety   5-9 Mild Anxiety  10-14 Moderate Anxiety  15-21 Severe Anxiety          Current Outpatient Medications   Medication Sig Dispense Refill   • traZODone (DESYREL) 50 MG Tab Take 1-2 Tablets by mouth at bedtime. 90 tablet 1   • omeprazole (PRILOSEC) 20 MG delayed-release capsule TAKE 1 CAPSULE BY MOUTH TWICE A  Cap 1   • atorvastatin (LIPITOR) 20 MG Tab Take 1 Tab by mouth every day. 90 Tab 2   • Loratadine (CLARITIN PO) Take  by mouth.     • Multiple Vitamins-Minerals (MULTIVITAMIN PO) Take  by mouth.       No current facility-administered medications for this visit.       Allergies, past medical history, past surgical history, medications, family history, social  "history reviewed and updated.    ROS   Constitutional: Denies fevers or chills  Eyes: Denies changes in vision  Ears/Nose/Throat/Mouth: Denies nasal congestion or sore throat   Cardiovascular: Denies chest pain or palpitations   Respiratory: Denies shortness of breath , Denies cough  Gastrointestinal/Hepatic: Denies abd pain, nausea, vomiting   Genitourinary: Denies dysuria or frequency  Musculoskeletal/Rheum: Denies joint pain and swelling   Neurological: Denies headache  Psychiatric: mild depression  Endocrine: Denies hx of diabetes or thyroid dysfunction  Heme/Oncology/Lymph Nodes: Denies weight changes or enlarged LNs.    Physical Exam  Vitals: /80 (BP Location: Left arm, Patient Position: Sitting, BP Cuff Size: Adult)   Pulse 63   Temp 36.6 °C (97.9 °F) (Temporal)   Resp 16   Ht 1.8 m (5' 10.87\")   Wt 95 kg (209 lb 7 oz)   SpO2 96%   BMI 29.32 kg/m²   General: Alert, pleasant, NAD  HEENT: Normocephalic.  EOMI, no icterus or pallor.  Conjunctivae and lids normal. External ears normal. Wearing a mask. Oropharynx non-erythematous, mucous membranes moist.  Neck supple.  No thyromegaly or masses palpated.   Lymph: No cervical or supraclavicular lymphadenopathy.  Cardiovascular: Regular rate and rhythm.  S1 and S2 normal.  No murmurs appreciated.  Respiratory: Normal respiratory effort.  Clear to auscultation bilaterally.  Abdomen: Non-distended, soft, non-tender  Skin: Warm, dry  Musculoskeletal: Gait is normal.  Moves all extremities well.  Extremities: No leg edema.    Psych:  Affect/mood is normal, judgement is good, memory is intact, grooming is appropriate.      Assessment and Plan    1. Insomnia, unspecified type  Melatonin as needed  - traZODone (DESYREL) 50 MG Tab; Take 1-2 Tablets by mouth at bedtime.  Dispense: 90 tablet; Refill: 1  - REFERRAL TO PSYCHOLOGY for cognitive behavioral therapy  - REFERRAL TO PULMONARY AND SLEEP MEDICINE  Good sleep hygiene:  --Keep a regular sleep schedule. Going " "to bed at same time and waking up at same time (even on weekends).   --Go to bed only when sleepy. Sleep only as much as you need to feel rested and then get out of bed  --Bed for sleep and sex only.  Do not read or watch TV in bed.  --Avoid TV, computer/tablet screens for 2 hours prior to bedtime or wear blue light blocking sunglasses. Avoid prolonged use of light-emitting screens before bedtime.  --Dark, cool, quiet bedroom. \"White noise\" (ie from a fan) may be helpful.  --No caffeinated beverages after lunch; do not use alcohol for sleep. Avoid alcohol near bedtime.  --Do not go to bed hungry  -- Do not take a nap during the day.  --Exercise regularly, preferably at least 4 to 5 hours before bedtime  --Make the bedroom environment conducive to sleep  --Can try melatonin, which are both over the counter.  -- Avoid smoking, especially in the evening  --Deal with your worries before bedtime  -- Get out of bed if unable to fall asleep within 20 minutes and go to another room. Return to bed only when sleepy. Repeat this step as many times as necessary throughout the night.  Stimulus control therapy rule      2. Mild depression (HCC)  - REFERRAL TO PSYCHOLOGY    3. At risk for sleep apnea  - REFERRAL TO PULMONARY AND SLEEP MEDICINE  Sleep on his side    4. LFT elevation  - FERRITIN; Future  - IRON/TOTAL IRON BIND; Future  - HEP B SURFACE AB; Future  - HEP B SURFACE ANTIGEN; Future  - HEP BE ANTIGEN; Future  - HEP C VIRUS ANTIBODY; Future  - HEPATITIS B CORE AB W/REFLEX  - HEP BE ANTIBODY; Future  - US-RUQ; Future  - Comp Metabolic Panel; Future  - TSH WITH REFLEX TO FT4; Future    5. Prediabetes  Healthful lifestyle measures    6. Hyperlipidemia, unspecified hyperlipidemia type  - Lipid Profile; Future    7. Encounter for preventive care  - CBC WITH DIFFERENTIAL; Future  - URINALYSIS,CULTURE IF INDICATED; Future    8. Restless leg    Healthful lifestyle measures discussed  He does not want to start medication " yet  Referral to sleep study    Behavioral strategies -- Use of the following interventions is supported primarily on the basis of clinical experience and, in some cases, small randomized trials  ?Mental alerting activities, such as working on a computer or doing crossword puzzles, at times of rest or boredom  ?Avoidance of aggravating factors, including consideration of withdrawal of possibly predisposing medications  ?Moderate regular exercise   ?Reduced caffeine intake  ?For symptomatic relief - walking, bicycling, soaking the affected limbs, and leg massage, including pneumatic compression    9. Vasectomy evaluation  -     REFERRAL TO UROLOGY          Follow-up:Return in about 2 months (around 9/6/2021), or if symptoms worsen or fail to improve.    This note was created using voice recognition software. There may be unintended errors in spelling, grammar or content.

## 2021-08-03 ENCOUNTER — APPOINTMENT (OUTPATIENT)
Dept: RADIOLOGY | Facility: MEDICAL CENTER | Age: 46
End: 2021-08-03
Attending: INTERNAL MEDICINE
Payer: COMMERCIAL

## 2021-08-19 ENCOUNTER — OFFICE VISIT (OUTPATIENT)
Dept: URGENT CARE | Facility: CLINIC | Age: 46
End: 2021-08-19
Payer: COMMERCIAL

## 2021-08-19 VITALS
HEART RATE: 72 BPM | WEIGHT: 207.2 LBS | OXYGEN SATURATION: 98 % | DIASTOLIC BLOOD PRESSURE: 80 MMHG | RESPIRATION RATE: 16 BRPM | SYSTOLIC BLOOD PRESSURE: 140 MMHG | BODY MASS INDEX: 29.66 KG/M2 | TEMPERATURE: 98 F | HEIGHT: 70 IN

## 2021-08-19 DIAGNOSIS — L30.9 DERMATITIS: ICD-10-CM

## 2021-08-19 PROCEDURE — 99213 OFFICE O/P EST LOW 20 MIN: CPT | Performed by: PHYSICIAN ASSISTANT

## 2021-08-19 RX ORDER — TRIAMCINOLONE ACETONIDE 1 MG/G
CREAM TOPICAL
Qty: 28.4 G | Refills: 0 | Status: SHIPPED | OUTPATIENT
Start: 2021-08-19 | End: 2022-12-14

## 2021-08-19 ASSESSMENT — ENCOUNTER SYMPTOMS
VOMITING: 0
SORE THROAT: 0
PALPITATIONS: 0
SHORTNESS OF BREATH: 0
FEVER: 0
BLURRED VISION: 0
CHILLS: 0
SENSORY CHANGE: 0
TINGLING: 0
NAUSEA: 0

## 2021-08-19 ASSESSMENT — FIBROSIS 4 INDEX: FIB4 SCORE: 0.5

## 2021-08-19 NOTE — PROGRESS NOTES
"Subjective     Danilo Sierra is a 46 y.o. male who presents with Rash (x around 1 wk, itchy rash on both hand)    HPI:  Danilo Sierra is a 46 y.o. male who presents today for evaluation of a rash.  Patient reports that for the past week or so he has noticed a itchy rash on his hands bilaterally.  He states that it started out as being in between his fingers but it has gradually started to spread to the sides of his hands and his wrist as well.  He states that he notices little \"bumps\" in the areas and he gets very itchy and red sometimes.  He states that he has noticed that washing dishes makes it worse and itching the area makes it worse.  He has tried applying over-the-counter hydrocortisone cream, allergy cream, and has tried using moisturizing lotions without relief.  He denies the use of any new lotions, soaps, detergents, foods, medications, etc.  Denies any history of eczema.  He has not had any fever/chills, joint pain, or sore throat.        Review of Systems   Constitutional: Negative for chills and fever.   HENT: Negative for sore throat.    Eyes: Negative for blurred vision.   Respiratory: Negative for shortness of breath.    Cardiovascular: Negative for chest pain and palpitations.   Gastrointestinal: Negative for nausea and vomiting.   Musculoskeletal: Negative for joint pain.   Skin: Positive for itching and rash.   Neurological: Negative for tingling and sensory change.           PMH:  has a past medical history of Allergic rhinitis (1/9/2015), GERD (gastroesophageal reflux disease), Hyperlipidemia, Insomnia (1/13/2012), and Mild depression ([facility]) (7/6/2021). He also has no past medical history of Anxiety, Arrhythmia, Asthma, Blood transfusion without reported diagnosis, Cancer ([facility]), CHF (congestive heart failure) ([facility]), Clotting disorder ([facility]), COPD (chronic obstructive pulmonary disease) ([facility]), Diabetes, Diabetic neuropathy ([facility]), Goiter, Heart attack ([facility]), Heart murmur, " "Hypertension, Kidney disease, Migraine, Pulmonary emphysema (HCC), Seizure (HCC), Stroke (HCC), or Thyroid disease.  MEDS:   Current Outpatient Medications:   •  triamcinolone acetonide (KENALOG) 0.1 % Cream, Apply to affected area(s) twice daily, Disp: 28.4 g, Rfl: 0  •  traZODone (DESYREL) 50 MG Tab, Take 1-2 Tablets by mouth at bedtime., Disp: 90 tablet, Rfl: 1  •  omeprazole (PRILOSEC) 20 MG delayed-release capsule, TAKE 1 CAPSULE BY MOUTH TWICE A DAY, Disp: 180 Cap, Rfl: 1  •  atorvastatin (LIPITOR) 20 MG Tab, Take 1 Tab by mouth every day., Disp: 90 Tab, Rfl: 2  •  Loratadine (CLARITIN PO), Take  by mouth., Disp: , Rfl:   •  Multiple Vitamins-Minerals (MULTIVITAMIN PO), Take  by mouth., Disp: , Rfl:   ALLERGIES: No Known Allergies  SURGHX:   Past Surgical History:   Procedure Laterality Date   • DENTAL EXTRACTION(S)      Burson Teeth     SOCHX:  reports that he has never smoked. He has never used smokeless tobacco. He reports current alcohol use of about 1.2 oz of alcohol per week. He reports that he does not use drugs.  FH: Family history was reviewed, no pertinent findings to report      Objective     /80 (BP Location: Left arm, Patient Position: Sitting, BP Cuff Size: Large adult)   Pulse 72   Temp 36.7 °C (98 °F) (Temporal)   Resp 16   Ht 1.778 m (5' 10\")   Wt 94 kg (207 lb 3.2 oz)   SpO2 98%   BMI 29.73 kg/m²      Physical Exam  Constitutional:       Appearance: He is well-developed.   HENT:      Head: Normocephalic and atraumatic.      Right Ear: External ear normal.      Left Ear: External ear normal.   Eyes:      Conjunctiva/sclera: Conjunctivae normal.      Pupils: Pupils are equal, round, and reactive to light.   Cardiovascular:      Rate and Rhythm: Normal rate and regular rhythm.      Heart sounds: Normal heart sounds. No murmur heard.     Pulmonary:      Effort: Pulmonary effort is normal.      Breath sounds: Normal breath sounds. No wheezing.   Skin:     General: Skin is warm and " dry.      Capillary Refill: Capillary refill takes less than 2 seconds.      Findings: Rash present.      Comments: Volar aspect of wrist bilaterally and lateral aspects of fingers exhibit mildly erythematous papular rash that blanches with pressure.  No vesicular lesions noted.  No signs of secondary bacterial skin infection.  No soft tissue swelling.   Neurological:      Mental Status: He is alert and oriented to person, place, and time.   Psychiatric:         Behavior: Behavior normal.         Judgment: Judgment normal.                 Assessment & Plan     1. Dermatitis  - triamcinolone acetonide (KENALOG) 0.1 % Cream; Apply to affected area(s) twice daily  Dispense: 28.4 g; Refill: 0  -Patient takes Claritin daily.  Recommend that until symptoms clear up he does twice daily dosing of Claritin.  He should avoid heat, cool compresses can help.  If the steroid cream is helping he can apply it for up to 2 weeks but should not apply it for longer than that.  If there is no improvement whatsoever after 1 week he should return to the urgent care for reevaluation.            Differential Diagnosis, natural history, and supportive care discussed. Return to the Urgent Care or follow up with your PCP if symptoms fail to resolve, or for any new or worsening symptoms. Emergency room precautions discussed. Patient and/or family appears understanding of information.

## 2021-08-23 ENCOUNTER — APPOINTMENT (OUTPATIENT)
Dept: BEHAVIORAL HEALTH | Facility: CLINIC | Age: 46
End: 2021-08-23
Payer: COMMERCIAL

## 2021-08-24 DIAGNOSIS — Z12.5 PROSTATE CANCER SCREENING: ICD-10-CM

## 2021-08-24 DIAGNOSIS — Z00.00 ENCOUNTER FOR PREVENTIVE CARE: ICD-10-CM

## 2021-08-24 DIAGNOSIS — Z83.49 FAMILY HISTORY OF THYROID DISEASE: ICD-10-CM

## 2021-08-24 NOTE — PROGRESS NOTES
Patient requests additional lab.     Family history of thyroid disease  -     TRIIDOTHYRONINE; Future  -     FREE THYROXINE; Future  -     TSH; Future  -     THYROID PEROXIDASE  (TPO) AB; Future    Encounter for preventive care  -     TESTOSTERONE SERUM; Future    Prostate cancer screening  -     PROSTATE SPECIFIC AG SCREENING; Future

## 2021-08-26 ENCOUNTER — HOSPITAL ENCOUNTER (OUTPATIENT)
Dept: RADIOLOGY | Facility: MEDICAL CENTER | Age: 46
End: 2021-08-26
Attending: INTERNAL MEDICINE
Payer: COMMERCIAL

## 2021-08-26 DIAGNOSIS — R79.89 LFT ELEVATION: ICD-10-CM

## 2021-08-26 PROCEDURE — 76705 ECHO EXAM OF ABDOMEN: CPT

## 2021-09-07 DIAGNOSIS — K21.9 GASTROESOPHAGEAL REFLUX DISEASE WITHOUT ESOPHAGITIS: ICD-10-CM

## 2021-09-07 DIAGNOSIS — G47.00 INSOMNIA, UNSPECIFIED TYPE: ICD-10-CM

## 2021-09-07 DIAGNOSIS — E78.5 HYPERLIPIDEMIA, UNSPECIFIED HYPERLIPIDEMIA TYPE: ICD-10-CM

## 2021-09-07 RX ORDER — ATORVASTATIN CALCIUM 20 MG/1
TABLET, FILM COATED ORAL
Qty: 90 TABLET | Refills: 2 | Status: SHIPPED | OUTPATIENT
Start: 2021-09-07 | End: 2022-07-01 | Stop reason: SDUPTHER

## 2021-09-07 RX ORDER — TRAZODONE HYDROCHLORIDE 50 MG/1
TABLET ORAL
Qty: 90 TABLET | Refills: 1 | Status: SHIPPED | OUTPATIENT
Start: 2021-09-07 | End: 2021-10-13

## 2021-09-07 RX ORDER — OMEPRAZOLE 20 MG/1
CAPSULE, DELAYED RELEASE ORAL
Qty: 90 CAPSULE | Refills: 2 | Status: SHIPPED | OUTPATIENT
Start: 2021-09-07 | End: 2022-07-01

## 2021-09-09 ENCOUNTER — OFFICE VISIT (OUTPATIENT)
Dept: MEDICAL GROUP | Facility: MEDICAL CENTER | Age: 46
End: 2021-09-09
Payer: COMMERCIAL

## 2021-09-09 VITALS
OXYGEN SATURATION: 98 % | HEIGHT: 70 IN | HEART RATE: 83 BPM | TEMPERATURE: 97.9 F | DIASTOLIC BLOOD PRESSURE: 80 MMHG | RESPIRATION RATE: 16 BRPM | BODY MASS INDEX: 29.67 KG/M2 | SYSTOLIC BLOOD PRESSURE: 132 MMHG | WEIGHT: 207.23 LBS

## 2021-09-09 DIAGNOSIS — R76.8 ANTI-TPO ANTIBODIES PRESENT: ICD-10-CM

## 2021-09-09 DIAGNOSIS — R79.89 LOW TESTOSTERONE IN MALE: ICD-10-CM

## 2021-09-09 DIAGNOSIS — K21.9 GASTROESOPHAGEAL REFLUX DISEASE WITHOUT ESOPHAGITIS: ICD-10-CM

## 2021-09-09 DIAGNOSIS — Z80.0 FAMILY HISTORY OF COLON CANCER: ICD-10-CM

## 2021-09-09 DIAGNOSIS — D50.9 IRON DEFICIENCY ANEMIA, UNSPECIFIED IRON DEFICIENCY ANEMIA TYPE: ICD-10-CM

## 2021-09-09 DIAGNOSIS — R74.01 ELEVATED ALT MEASUREMENT: ICD-10-CM

## 2021-09-09 DIAGNOSIS — E78.5 HYPERLIPIDEMIA, UNSPECIFIED HYPERLIPIDEMIA TYPE: ICD-10-CM

## 2021-09-09 PROCEDURE — 99214 OFFICE O/P EST MOD 30 MIN: CPT | Performed by: INTERNAL MEDICINE

## 2021-09-09 ASSESSMENT — FIBROSIS 4 INDEX: FIB4 SCORE: 0.5

## 2021-09-09 NOTE — ASSESSMENT & PLAN NOTE
Elevated ALT  Per patient he cut down on drinking significantly  8/2021 liver US: unremarkable  8/2021 Hep B surface Ag negative. Hep C Ab negative.   TSH, FT4, total T3 normal

## 2021-09-09 NOTE — PROGRESS NOTES
Established Patient    Danilo Sierra is a 46 y.o. male who presents today with the following:    CC:   Chief Complaint   Patient presents with   • Lab Results     at [facility] :Labs       HPI:     Family history of colon cancer  Maternal grandfather had colon cancer      Iron deficiency anemia  Iron def anemia  Denies overt bleeding  8/25/21  H&H 11.7L  &40.2 MCV 75.8  WBC 5.4  Iron 31 L(normal value )  TIBC 456 H (normal value 250-425)  % saturation 7% L (normal value 20-48%)  Ferritin 13 (normal value )  Heartburn on omeprazole  Occasionally takes NSAID for headache  Family hx of colon cancer (maternal grandfather)   8/25/21Urinalysis is normal          Gastroesophageal reflux disease without esophagitis   on omeprazole  Has iron def anemia      Elevated ALT measurement  Elevated ALT  Per patient he cut down on drinking significantly  8/2021 liver US: unremarkable  8/2021 Hep B surface Ag negative. Hep C Ab negative.   TSH, FT4, total T3 normal      Low testosterone in male  8/2021 8-10 am total testosterone 131 L  No significant erectile dysfunction  Will recheck and further work up      Hyperlipidemia  8/2021 T chol 175, HDL 23, Trig 108,   Healthful lifestyle measures  On lipitor          Anti-TPO antibodies present  8/2021 TPO antibody 519  8/2021   TSH 3.04, (normal value 0.4-4.5 )    FT4 1.4, (normal value 0.8-1.8)    total T3 95 (normal value )  His brother has hx of hashimoto's thyroiditis  Asymptomatic  monitor        Current Outpatient Medications   Medication Sig Dispense Refill   • traZODone (DESYREL) 50 MG Tab TAKE 1 TABLET BY MOUTH AT BEDTIME AS NEEDED FOR SLEEP FOR UP TO 30 DAYS. 90 Tablet 1   • atorvastatin (LIPITOR) 20 MG Tab TAKE 1 TABLET BY MOUTH EVERY DAY 90 Tablet 2   • omeprazole (PRILOSEC) 20 MG delayed-release capsule TAKE 1 CAPSULE BY MOUTH EVERY DAY 90 Capsule 2   • triamcinolone acetonide (KENALOG) 0.1 % Cream Apply to affected area(s) twice daily  "28.4 g 0   • Loratadine (CLARITIN PO) Take  by mouth.     • Multiple Vitamins-Minerals (MULTIVITAMIN PO) Take  by mouth. (Patient not taking: Reported on 9/9/2021)       No current facility-administered medications for this visit.       Allergies, past medical history, past surgical history, medications, family history, social history reviewed and updated.    ROS   Constitutional: Denies fevers or chills  Eyes: Denies changes in vision  Ears/Nose/Throat/Mouth: Denies nasal congestion or sore throat   Cardiovascular: Denies chest pain or palpitations   Respiratory: Denies shortness of breath , Denies cough  Gastrointestinal/Hepatic: Denies abd pain, nausea, vomiting   Genitourinary: Denies dysuria or frequency  Musculoskeletal/Rheum: Denies joint pain and swelling   Neurological: Denies headache  Psychiatric: Denies mood disorder   Endocrine: Denies hx of diabetes or thyroid dysfunction  Heme/Oncology/Lymph Nodes: Denies weight changes or enlarged LNs.    Physical Exam  Vitals: /80 (BP Location: Left arm, Patient Position: Sitting, BP Cuff Size: Adult)   Pulse 83   Temp 36.6 °C (97.9 °F) (Temporal)   Resp 16   Ht 1.79 m (5' 10.47\")   Wt 94 kg (207 lb 3.7 oz)   SpO2 98%   BMI 29.34 kg/m²   General: Alert, pleasant, NAD  HEENT: Normocephalic.  EOMI, no icterus or pallor.  Conjunctivae and lids normal. External ears normal. Wearing a mask. Oropharynx non-erythematous, mucous membranes moist.  Neck supple.  No thyromegaly or masses palpated.   Lymph: No cervical or supraclavicular lymphadenopathy.  Cardiovascular: Regular rate and rhythm.  S1 and S2 normal.  No murmurs appreciated.  Respiratory: Normal respiratory effort.  Clear to auscultation bilaterally.  Abdomen: Non-distended, soft, non-tender  Skin: Warm, dry, no rashes.  Musculoskeletal: Gait is normal.  Moves all extremities well.  Extremities: No leg edema.  Radial pulses 2+ symmetric.   Psych:  Affect/mood is normal, judgement is good, memory is " intact, grooming is appropriate.      Labs (8/25/21) were reviewed and discussed with patients.  All questions were answered.      Assessment and Plan    Danilo was seen today for lab results.    Diagnoses and all orders for this visit:    Iron deficiency anemia, unspecified iron deficiency anemia type  Denies overt bleeding  Referral to GI to help determine the cause  -     REFERRAL TO GASTROENTEROLOGY  -     RETICULOCYTES COUNT; Future  -     T-TRANSGLUTAMINASE (TTG) IGA; Future  -     IGA SERUM QUANT; Future  -     ERYTHROPOIETIN; Future  -  Avoid NSAIDs    Gastroesophageal reflux disease without esophagitis  Continue PPI  Avoid triggers  Stay upright for during eating and for 3 hours after eating  Elevated head of bed   -     REFERRAL TO GASTROENTEROLOGY    Family history of colon cancer  -     REFERRAL TO GASTROENTEROLOGY    Elevated ALT measurement  -     REFERRAL TO GASTROENTEROLOGY  Monitor  Healthful lifestyle measures for overweight    Hyperlipidemia, unspecified hyperlipidemia type  Healthful lifestyle measures  Statin    Low testosterone in male  -     TESTOSTERONE SERUM; Future  -     FSH/LH; Future    Anti-TPO antibodies present  8/2021 TPO antibody 519  8/2021   TSH 3.04, (normal value 0.4-4.5 )    FT4 1.4, (normal value 0.8-1.8)    total T3 95 (normal value )  His brother has hx of hashimoto's thyroiditis  Asymptomatic, thyroid function normal  Monitor thyroid function          Follow-up:Return in about 3 months (around 12/9/2021), or if symptoms worsen or fail to improve.    This note was created using voice recognition software. There may be unintended errors in spelling, grammar or content.

## 2021-09-09 NOTE — ASSESSMENT & PLAN NOTE
8/2021 TPO antibody 519  8/2021   TSH 3.04, (normal value 0.4-4.5 )    FT4 1.4, (normal value 0.8-1.8)    total T3 95 (normal value )  His brother has hx of hashimoto's thyroiditis  Asymptomatic  monitor

## 2021-09-09 NOTE — ASSESSMENT & PLAN NOTE
8/2021 8-10 am total testosterone 131 L  No significant erectile dysfunction  Will recheck and further work up

## 2021-09-16 LAB
EPO SERPL-ACNC: 56.8 MIU/ML (ref 2.6–18.5)
FSH SERPL-ACNC: <0.3 MIU/ML (ref 1.5–12.4)
IGA SERPL-MCNC: 285 MG/DL (ref 90–386)
LH SERPL-ACNC: 0.3 MIU/ML (ref 1.7–8.6)
RETICS/RBC NFR AUTO: 1.5 % (ref 0.6–2.6)
TESTOST SERPL-MCNC: >1500 NG/DL (ref 264–916)
TTG IGA SER-ACNC: <2 U/ML (ref 0–3)

## 2021-09-21 DIAGNOSIS — R79.89 HIGH SERUM TESTOSTERONE: ICD-10-CM

## 2021-10-13 DIAGNOSIS — G47.00 INSOMNIA, UNSPECIFIED TYPE: ICD-10-CM

## 2021-10-13 RX ORDER — TRAZODONE HYDROCHLORIDE 50 MG/1
TABLET ORAL
Qty: 60 TABLET | Refills: 2 | Status: SHIPPED | OUTPATIENT
Start: 2021-10-13 | End: 2022-02-02

## 2021-10-19 LAB
FSH SERPL-ACNC: 4.7 MIU/ML (ref 1.5–12.4)
LH SERPL-ACNC: 4.9 MIU/ML (ref 1.7–8.6)
TESTOST SERPL-MCNC: 374 NG/DL (ref 264–916)

## 2021-11-15 ENCOUNTER — TELEPHONE (OUTPATIENT)
Dept: MEDICAL GROUP | Facility: MEDICAL CENTER | Age: 46
End: 2021-11-15

## 2021-11-15 NOTE — TELEPHONE ENCOUNTER
Lab orders mailed to patient 11/15/21.    Stacey WARE  Medical assistant        ----- Message from Ana Cristina Collazo M.D. sent at 9/21/2021 10:33 AM PDT -----  Regarding: FW: Question regarding FSH/LH  Love Ibarra,    Please print out the lab slip for testosterone and LH, FSH. Ask patient how he wants to get the lab slip, he is planning to do the blood test 8-10 am in one to two months at [facility].    Thank you.    Dr. Collazo  ----- Message -----  From: Danilo Sierra  Sent: 9/21/2021  10:25 AM PDT  To: Ana Cristina Collazo M.D.  Subject: Question regarding FSH/LH                        I will make sure it is done between 8-10 am. Where do I get the paperwork to get tested again?

## 2022-02-02 DIAGNOSIS — G47.00 INSOMNIA, UNSPECIFIED TYPE: ICD-10-CM

## 2022-02-02 RX ORDER — TRAZODONE HYDROCHLORIDE 50 MG/1
TABLET ORAL
Qty: 60 TABLET | Refills: 2 | Status: SHIPPED | OUTPATIENT
Start: 2022-02-02 | End: 2022-07-01 | Stop reason: SDUPTHER

## 2022-05-10 SDOH — ECONOMIC STABILITY: HOUSING INSECURITY
IN THE LAST 12 MONTHS, WAS THERE A TIME WHEN YOU DID NOT HAVE A STEADY PLACE TO SLEEP OR SLEPT IN A SHELTER (INCLUDING NOW)?: NO

## 2022-05-10 SDOH — ECONOMIC STABILITY: TRANSPORTATION INSECURITY
IN THE PAST 12 MONTHS, HAS THE LACK OF TRANSPORTATION KEPT YOU FROM MEDICAL APPOINTMENTS OR FROM GETTING MEDICATIONS?: NO

## 2022-05-10 SDOH — ECONOMIC STABILITY: FOOD INSECURITY: WITHIN THE PAST 12 MONTHS, YOU WORRIED THAT YOUR FOOD WOULD RUN OUT BEFORE YOU GOT MONEY TO BUY MORE.: NEVER TRUE

## 2022-05-10 SDOH — ECONOMIC STABILITY: FOOD INSECURITY: WITHIN THE PAST 12 MONTHS, THE FOOD YOU BOUGHT JUST DIDN'T LAST AND YOU DIDN'T HAVE MONEY TO GET MORE.: NEVER TRUE

## 2022-05-10 SDOH — ECONOMIC STABILITY: INCOME INSECURITY: HOW HARD IS IT FOR YOU TO PAY FOR THE VERY BASICS LIKE FOOD, HOUSING, MEDICAL CARE, AND HEATING?: NOT VERY HARD

## 2022-05-10 SDOH — HEALTH STABILITY: PHYSICAL HEALTH: ON AVERAGE, HOW MANY DAYS PER WEEK DO YOU ENGAGE IN MODERATE TO STRENUOUS EXERCISE (LIKE A BRISK WALK)?: 4 DAYS

## 2022-05-10 SDOH — ECONOMIC STABILITY: HOUSING INSECURITY: IN THE LAST 12 MONTHS, HOW MANY PLACES HAVE YOU LIVED?: 1

## 2022-05-10 SDOH — ECONOMIC STABILITY: INCOME INSECURITY: IN THE LAST 12 MONTHS, WAS THERE A TIME WHEN YOU WERE NOT ABLE TO PAY THE MORTGAGE OR RENT ON TIME?: NO

## 2022-05-10 SDOH — HEALTH STABILITY: PHYSICAL HEALTH: ON AVERAGE, HOW MANY MINUTES DO YOU ENGAGE IN EXERCISE AT THIS LEVEL?: 60 MIN

## 2022-05-10 SDOH — ECONOMIC STABILITY: TRANSPORTATION INSECURITY
IN THE PAST 12 MONTHS, HAS LACK OF RELIABLE TRANSPORTATION KEPT YOU FROM MEDICAL APPOINTMENTS, MEETINGS, WORK OR FROM GETTING THINGS NEEDED FOR DAILY LIVING?: NO

## 2022-05-10 SDOH — ECONOMIC STABILITY: TRANSPORTATION INSECURITY
IN THE PAST 12 MONTHS, HAS LACK OF TRANSPORTATION KEPT YOU FROM MEETINGS, WORK, OR FROM GETTING THINGS NEEDED FOR DAILY LIVING?: NO

## 2022-05-10 SDOH — HEALTH STABILITY: MENTAL HEALTH
STRESS IS WHEN SOMEONE FEELS TENSE, NERVOUS, ANXIOUS, OR CAN'T SLEEP AT NIGHT BECAUSE THEIR MIND IS TROUBLED. HOW STRESSED ARE YOU?: RATHER MUCH

## 2022-05-10 ASSESSMENT — LIFESTYLE VARIABLES
HOW OFTEN DO YOU HAVE A DRINK CONTAINING ALCOHOL: 2-4 TIMES A MONTH
HOW MANY STANDARD DRINKS CONTAINING ALCOHOL DO YOU HAVE ON A TYPICAL DAY: 1 OR 2
HOW OFTEN DO YOU HAVE SIX OR MORE DRINKS ON ONE OCCASION: NEVER

## 2022-05-10 ASSESSMENT — SOCIAL DETERMINANTS OF HEALTH (SDOH)
WITHIN THE PAST 12 MONTHS, YOU WORRIED THAT YOUR FOOD WOULD RUN OUT BEFORE YOU GOT THE MONEY TO BUY MORE: NEVER TRUE
DO YOU BELONG TO ANY CLUBS OR ORGANIZATIONS SUCH AS CHURCH GROUPS UNIONS, FRATERNAL OR ATHLETIC GROUPS, OR SCHOOL GROUPS?: NO
HOW OFTEN DO YOU ATTEND CHURCH OR RELIGIOUS SERVICES?: NEVER
HOW MANY DRINKS CONTAINING ALCOHOL DO YOU HAVE ON A TYPICAL DAY WHEN YOU ARE DRINKING: 1 OR 2
HOW OFTEN DO YOU GET TOGETHER WITH FRIENDS OR RELATIVES?: MORE THAN THREE TIMES A WEEK
HOW OFTEN DO YOU ATTENT MEETINGS OF THE CLUB OR ORGANIZATION YOU BELONG TO?: NEVER
IN A TYPICAL WEEK, HOW MANY TIMES DO YOU TALK ON THE PHONE WITH FAMILY, FRIENDS, OR NEIGHBORS?: ONCE A WEEK
HOW OFTEN DO YOU ATTENT MEETINGS OF THE CLUB OR ORGANIZATION YOU BELONG TO?: NEVER
HOW OFTEN DO YOU ATTEND CHURCH OR RELIGIOUS SERVICES?: NEVER
HOW HARD IS IT FOR YOU TO PAY FOR THE VERY BASICS LIKE FOOD, HOUSING, MEDICAL CARE, AND HEATING?: NOT VERY HARD
DO YOU BELONG TO ANY CLUBS OR ORGANIZATIONS SUCH AS CHURCH GROUPS UNIONS, FRATERNAL OR ATHLETIC GROUPS, OR SCHOOL GROUPS?: NO
ARE YOU MARRIED, WIDOWED, DIVORCED, SEPARATED, NEVER MARRIED, OR LIVING WITH A PARTNER?: LIVING WITH PARTNER
HOW OFTEN DO YOU GET TOGETHER WITH FRIENDS OR RELATIVES?: MORE THAN THREE TIMES A WEEK
IN A TYPICAL WEEK, HOW MANY TIMES DO YOU TALK ON THE PHONE WITH FAMILY, FRIENDS, OR NEIGHBORS?: ONCE A WEEK
HOW OFTEN DO YOU HAVE A DRINK CONTAINING ALCOHOL: 2-4 TIMES A MONTH
HOW OFTEN DO YOU HAVE SIX OR MORE DRINKS ON ONE OCCASION: NEVER
ARE YOU MARRIED, WIDOWED, DIVORCED, SEPARATED, NEVER MARRIED, OR LIVING WITH A PARTNER?: LIVING WITH PARTNER

## 2022-05-11 ENCOUNTER — OFFICE VISIT (OUTPATIENT)
Dept: MEDICAL GROUP | Facility: LAB | Age: 47
End: 2022-05-11
Payer: COMMERCIAL

## 2022-05-11 VITALS
RESPIRATION RATE: 16 BRPM | OXYGEN SATURATION: 100 % | TEMPERATURE: 97.7 F | BODY MASS INDEX: 31.64 KG/M2 | HEART RATE: 64 BPM | HEIGHT: 70 IN | SYSTOLIC BLOOD PRESSURE: 138 MMHG | WEIGHT: 221 LBS | DIASTOLIC BLOOD PRESSURE: 90 MMHG

## 2022-05-11 DIAGNOSIS — K21.9 GASTROESOPHAGEAL REFLUX DISEASE WITHOUT ESOPHAGITIS: ICD-10-CM

## 2022-05-11 DIAGNOSIS — R79.89 LOW TESTOSTERONE LEVEL IN MALE: ICD-10-CM

## 2022-05-11 DIAGNOSIS — E03.8 SUBCLINICAL HYPOTHYROIDISM: Primary | ICD-10-CM

## 2022-05-11 DIAGNOSIS — D50.9 IRON DEFICIENCY ANEMIA, UNSPECIFIED IRON DEFICIENCY ANEMIA TYPE: ICD-10-CM

## 2022-05-11 DIAGNOSIS — Z91.89 AT RISK FOR SLEEP APNEA: ICD-10-CM

## 2022-05-11 DIAGNOSIS — R76.8 ANTI-TPO ANTIBODIES PRESENT: ICD-10-CM

## 2022-05-11 DIAGNOSIS — Z00.00 PREVENTATIVE HEALTH CARE: ICD-10-CM

## 2022-05-11 PROCEDURE — 99214 OFFICE O/P EST MOD 30 MIN: CPT | Performed by: PHYSICIAN ASSISTANT

## 2022-05-11 ASSESSMENT — FIBROSIS 4 INDEX: FIB4 SCORE: 0.51

## 2022-05-11 ASSESSMENT — PATIENT HEALTH QUESTIONNAIRE - PHQ9: CLINICAL INTERPRETATION OF PHQ2 SCORE: 0

## 2022-05-11 NOTE — ASSESSMENT & PLAN NOTE
New to me, history of iron deficiency, previously had a an endoscopy and colonoscopy all of which were normal.  No history of blood in the stool.  Does have chronic fatigue.  Due for updated labs

## 2022-05-11 NOTE — ASSESSMENT & PLAN NOTE
New to me, chronic  Per labs in September 2021, TPO was 519.  TSH, T4 and T3 were within normal, TSH was at 3.0.  Patient has a very strong family history of Hashimoto's thyroiditis.  Reports that he often feels tired.  There has been a 14 pound weight gain since September 2021.

## 2022-05-11 NOTE — PROGRESS NOTES
Subjective:   CC: Danilo Sierra is a 47 y.o. male here today to establish care with new provider; chronic care management, updated labs     HPI:  At risk for sleep apnea  New to me; snores and has increase daytime fatigue.       Subclinical hypothyroidism  New to me, chronic  Per labs in September 2021, TPO was 519.  TSH, T4 and T3 were within normal, TSH was at 3.0.  Patient has a very strong family history of Hashimoto's thyroiditis.  Reports that he often feels tired.  There has been a 14 pound weight gain since September 2021.    Iron deficiency anemia  New to me, history of iron deficiency, previously had a an endoscopy and colonoscopy all of which were normal.  No history of blood in the stool.  Does have chronic fatigue.  Due for updated labs      Gastroesophageal reflux disease without esophagitis  New to me; chronic and stable on current regimen.          Current medicines (including changes today)  Current Outpatient Medications   Medication Sig Dispense Refill   • traZODone (DESYREL) 50 MG Tab TAKE 1 TO 2 TABLETS BY MOUTH AT BEDTIME 60 Tablet 2   • atorvastatin (LIPITOR) 20 MG Tab TAKE 1 TABLET BY MOUTH EVERY DAY 90 Tablet 2   • omeprazole (PRILOSEC) 20 MG delayed-release capsule TAKE 1 CAPSULE BY MOUTH EVERY DAY 90 Capsule 2   • triamcinolone acetonide (KENALOG) 0.1 % Cream Apply to affected area(s) twice daily 28.4 g 0   • Loratadine (CLARITIN PO) Take  by mouth.       No current facility-administered medications for this visit.     He  has a past medical history of Allergic rhinitis (1/9/2015), GERD (gastroesophageal reflux disease), Hyperlipidemia, Insomnia (1/13/2012), Mild depression (HCC) (7/6/2021), and Subclinical hypothyroidism (5/11/2022).    He has no past medical history of Anxiety, Arrhythmia, Asthma, Blood transfusion without reported diagnosis, Cancer ([facility]), CHF (congestive heart failure) ([facility]), Clotting disorder ([facility]), COPD (chronic obstructive pulmonary disease) ([facility]),  "Diabetes, Diabetic neuropathy (HCC), Goiter, Heart attack (HCC), Heart murmur, Hypertension, Kidney disease, Migraine, Pulmonary emphysema (HCC), Seizure (HCC), or Stroke (HCC).    ROS   No chest pain, no shortness of breath, no abdominal pain       Objective:     BP (!) 138/90 (BP Location: Left arm, Patient Position: Sitting, BP Cuff Size: Adult)   Pulse 64   Temp 36.5 °C (97.7 °F) (Temporal)   Resp 16   Ht 1.79 m (5' 10.47\")   Wt 100 kg (221 lb)   SpO2 100%  Body mass index is 31.29 kg/m².   Physical Exam:  Constitutional: Alert, no distress.  Skin: Warm, dry, good turgor, no rashes in visible areas.  Eye: Equal, round, conjunctiva clear, lids normal.  Neck: Trachea midline, no masses, symmetric thyromegaly. No cervical or supraclavicular lymphadenopathy  Respiratory: Unlabored respiratory effort, lungs clear to auscultation, no wheezes, no ronchi.  Cardiovascular: Normal S1, S2, no murmur, no edema.  Psych: Alert and oriented x3, normal affect and mood.    Assessment and Plan:   The following treatment plan was discussed    1. At risk for sleep apnea  Sleep studying pending     2. Subclinical hypothyroidism  To recheck labs.   May benefit from low dose thyroid medication.   - TSH; Future  - FREE THYROXINE; Future  - T3 FREE; Future  - THYROID PEROXIDASE  (TPO) AB; Future    3. Iron deficiency anemia, unspecified iron deficiency anemia type  Due to repeat labs   - CBC WITH DIFFERENTIAL; Future  - IRON/TOTAL IRON BIND; Future  - FERRITIN; Future    4. Preventative health care  - Comp Metabolic Panel; Future  - Lipid Profile; Future  - HEMOGLOBIN A1C; Future    5. Low testosterone level in male  - Testosterone, Free & Total, Adult Male (w/SHBG); Future    6. Anti-TPO antibodies present  Symmetric thyromegaly on exam today  - US-THYROID; Future    7. Gastroesophageal reflux disease without esophagitis  New to me; chronic and stable on current regimen.     Followup: Return if symptoms worsen or fail to improve, " for Awaiting labs .       Kimberly Alcala P.A.-C.  Supervising MD: Dr. Samuel Ashraf MD  05/11/22

## 2022-05-16 ENCOUNTER — HOSPITAL ENCOUNTER (OUTPATIENT)
Dept: RADIOLOGY | Facility: MEDICAL CENTER | Age: 47
End: 2022-05-16
Attending: PHYSICIAN ASSISTANT
Payer: COMMERCIAL

## 2022-05-16 DIAGNOSIS — R76.8 ANTI-TPO ANTIBODIES PRESENT: ICD-10-CM

## 2022-05-16 PROCEDURE — 76536 US EXAM OF HEAD AND NECK: CPT

## 2022-06-24 LAB
ALBUMIN SERPL-MCNC: 4.5 G/DL (ref 4–5)
ALBUMIN/GLOB SERPL: 1.6 {RATIO} (ref 1.2–2.2)
ALP SERPL-CCNC: 96 IU/L (ref 44–121)
ALT SERPL-CCNC: 35 IU/L (ref 0–44)
AST SERPL-CCNC: 25 IU/L (ref 0–40)
BASOPHILS # BLD AUTO: 0.1 X10E3/UL (ref 0–0.2)
BASOPHILS NFR BLD AUTO: 1 %
BILIRUB SERPL-MCNC: 0.6 MG/DL (ref 0–1.2)
BUN SERPL-MCNC: 18 MG/DL (ref 6–24)
BUN/CREAT SERPL: 15 (ref 9–20)
CALCIUM SERPL-MCNC: 9.7 MG/DL (ref 8.7–10.2)
CHLORIDE SERPL-SCNC: 104 MMOL/L (ref 96–106)
CHOLEST SERPL-MCNC: 275 MG/DL (ref 100–199)
CO2 SERPL-SCNC: 23 MMOL/L (ref 20–29)
CREAT SERPL-MCNC: 1.23 MG/DL (ref 0.76–1.27)
EGFRCR SERPLBLD CKD-EPI 2021: 73 ML/MIN/1.73
EOSINOPHIL # BLD AUTO: 0.4 X10E3/UL (ref 0–0.4)
EOSINOPHIL NFR BLD AUTO: 7 %
ERYTHROCYTE [DISTWIDTH] IN BLOOD BY AUTOMATED COUNT: 13.5 % (ref 11.6–15.4)
FERRITIN SERPL-MCNC: 212 NG/ML (ref 30–400)
GLOBULIN SER CALC-MCNC: 2.8 G/DL (ref 1.5–4.5)
GLUCOSE SERPL-MCNC: 99 MG/DL (ref 65–99)
HBA1C MFR BLD: 5.8 % (ref 4.8–5.6)
HCT VFR BLD AUTO: 50.8 % (ref 37.5–51)
HDLC SERPL-MCNC: 43 MG/DL
HGB BLD-MCNC: 17.1 G/DL (ref 13–17.7)
IMM GRANULOCYTES # BLD AUTO: 0 X10E3/UL (ref 0–0.1)
IMM GRANULOCYTES NFR BLD AUTO: 0 %
IMMATURE CELLS  115398: ABNORMAL
IRON SATN MFR SERPL: 33 % (ref 15–55)
IRON SERPL-MCNC: 100 UG/DL (ref 38–169)
LABORATORY COMMENT REPORT: ABNORMAL
LDLC SERPL CALC-MCNC: 201 MG/DL (ref 0–99)
LYMPHOCYTES # BLD AUTO: 2.5 X10E3/UL (ref 0.7–3.1)
LYMPHOCYTES NFR BLD AUTO: 44 %
MCH RBC QN AUTO: 29.1 PG (ref 26.6–33)
MCHC RBC AUTO-ENTMCNC: 33.7 G/DL (ref 31.5–35.7)
MCV RBC AUTO: 87 FL (ref 79–97)
MONOCYTES # BLD AUTO: 0.4 X10E3/UL (ref 0.1–0.9)
MONOCYTES NFR BLD AUTO: 6 %
MORPHOLOGY BLD-IMP: ABNORMAL
NEUTROPHILS # BLD AUTO: 2.4 X10E3/UL (ref 1.4–7)
NEUTROPHILS NFR BLD AUTO: 42 %
NRBC BLD AUTO-RTO: ABNORMAL %
PLATELET # BLD AUTO: 238 X10E3/UL (ref 150–450)
POTASSIUM SERPL-SCNC: 4.9 MMOL/L (ref 3.5–5.2)
PROT SERPL-MCNC: 7.3 G/DL (ref 6–8.5)
RBC # BLD AUTO: 5.87 X10E6/UL (ref 4.14–5.8)
SHBG SERPL-SCNC: 39.7 NMOL/L
SODIUM SERPL-SCNC: 141 MMOL/L (ref 134–144)
T3FREE SERPL-MCNC: 3.2 PG/ML (ref 2–4.4)
T4 FREE SERPL-MCNC: 1.48 NG/DL (ref 0.82–1.77)
TESTOST FREE MFR SERPL: 1.1 %
TESTOST FREE SERPL-MCNC: 49 PG/ML
TESTOST SERPL-MCNC: 448 NG/DL
TESTOST SERPL-MCNC: 471 NG/DL (ref 264–916)
THYROPEROXIDASE AB SERPL-ACNC: 156 IU/ML (ref 0–34)
TIBC SERPL-MCNC: 304 UG/DL (ref 250–450)
TRIGL SERPL-MCNC: 163 MG/DL (ref 0–149)
TSH SERPL DL<=0.005 MIU/L-ACNC: 3.75 UIU/ML (ref 0.45–4.5)
UIBC SERPL-MCNC: 204 UG/DL (ref 111–343)
VLDLC SERPL CALC-MCNC: 31 MG/DL (ref 5–40)
WBC # BLD AUTO: 5.7 X10E3/UL (ref 3.4–10.8)

## 2022-07-01 ENCOUNTER — OFFICE VISIT (OUTPATIENT)
Dept: MEDICAL GROUP | Facility: LAB | Age: 47
End: 2022-07-01
Payer: COMMERCIAL

## 2022-07-01 VITALS
HEART RATE: 67 BPM | HEIGHT: 70 IN | BODY MASS INDEX: 31.07 KG/M2 | RESPIRATION RATE: 18 BRPM | OXYGEN SATURATION: 100 % | SYSTOLIC BLOOD PRESSURE: 132 MMHG | DIASTOLIC BLOOD PRESSURE: 92 MMHG | WEIGHT: 217 LBS | TEMPERATURE: 97.9 F

## 2022-07-01 DIAGNOSIS — G57.01 PIRIFORMIS SYNDROME OF RIGHT SIDE: ICD-10-CM

## 2022-07-01 DIAGNOSIS — R76.8 ANTI-TPO ANTIBODIES PRESENT: ICD-10-CM

## 2022-07-01 DIAGNOSIS — E78.5 HYPERLIPIDEMIA, UNSPECIFIED HYPERLIPIDEMIA TYPE: ICD-10-CM

## 2022-07-01 DIAGNOSIS — R79.89 LOW TESTOSTERONE LEVEL IN MALE: ICD-10-CM

## 2022-07-01 DIAGNOSIS — K21.9 GASTROESOPHAGEAL REFLUX DISEASE WITHOUT ESOPHAGITIS: ICD-10-CM

## 2022-07-01 DIAGNOSIS — E03.8 SUBCLINICAL HYPOTHYROIDISM: ICD-10-CM

## 2022-07-01 DIAGNOSIS — R73.03 PREDIABETES: ICD-10-CM

## 2022-07-01 DIAGNOSIS — G47.00 INSOMNIA, UNSPECIFIED TYPE: ICD-10-CM

## 2022-07-01 DIAGNOSIS — Z91.89 AT RISK FOR SLEEP APNEA: ICD-10-CM

## 2022-07-01 DIAGNOSIS — M54.41 ACUTE RIGHT-SIDED LOW BACK PAIN WITH RIGHT-SIDED SCIATICA: ICD-10-CM

## 2022-07-01 PROBLEM — M77.02 BILATERAL MEDIAL EPICONDYLITIS OF ELBOW JOINT: Status: RESOLVED | Noted: 2020-07-13 | Resolved: 2022-07-01

## 2022-07-01 PROBLEM — M77.01 BILATERAL MEDIAL EPICONDYLITIS OF ELBOW JOINT: Status: RESOLVED | Noted: 2020-07-13 | Resolved: 2022-07-01

## 2022-07-01 PROBLEM — R74.01 ELEVATED ALT MEASUREMENT: Status: RESOLVED | Noted: 2021-09-09 | Resolved: 2022-07-01

## 2022-07-01 PROCEDURE — 99214 OFFICE O/P EST MOD 30 MIN: CPT | Performed by: PHYSICIAN ASSISTANT

## 2022-07-01 RX ORDER — OMEPRAZOLE 40 MG/1
CAPSULE, DELAYED RELEASE ORAL
COMMUNITY
Start: 2022-06-12 | End: 2022-07-01 | Stop reason: SDUPTHER

## 2022-07-01 RX ORDER — ATORVASTATIN CALCIUM 20 MG/1
20 TABLET, FILM COATED ORAL
Qty: 90 TABLET | Refills: 2 | Status: SHIPPED | OUTPATIENT
Start: 2022-07-01 | End: 2023-07-19 | Stop reason: SDUPTHER

## 2022-07-01 RX ORDER — LEVOTHYROXINE SODIUM 0.03 MG/1
25 TABLET ORAL
Qty: 90 TABLET | Refills: 1 | Status: SHIPPED | OUTPATIENT
Start: 2022-07-01 | End: 2023-02-02 | Stop reason: SDUPTHER

## 2022-07-01 RX ORDER — TRAZODONE HYDROCHLORIDE 50 MG/1
TABLET ORAL
Qty: 60 TABLET | Refills: 2 | Status: SHIPPED | OUTPATIENT
Start: 2022-07-01 | End: 2022-10-06 | Stop reason: SDUPTHER

## 2022-07-01 RX ORDER — OMEPRAZOLE 40 MG/1
CAPSULE, DELAYED RELEASE ORAL
Qty: 90 CAPSULE | Refills: 2 | Status: SHIPPED | OUTPATIENT
Start: 2022-07-01 | End: 2022-12-14 | Stop reason: SDUPTHER

## 2022-07-01 ASSESSMENT — FIBROSIS 4 INDEX: FIB4 SCORE: 0.83

## 2022-07-01 NOTE — ASSESSMENT & PLAN NOTE
New to me; reports that he has been R sided low back pain x3-4 months.   Unsure if he injured this while exercises.     Has a very physical job.  Using IBU/Tylenol without much relief.   No recent muscle relaxer.

## 2022-07-01 NOTE — PROGRESS NOTES
Subjective:   CC: Danilo Sierra is a 47 y.o. male here today for lab follow up; low back pain     HPI:  Acute right-sided low back pain with right-sided sciatica  New to me; reports that he has been R sided low back pain x3-4 months.   Unsure if he injured this while exercises.     Has a very physical job.  Using IBU/Tylenol without much relief.   No recent muscle relaxer.     Prediabetes  Follow up;   Lab Results   Component Value Date/Time    HBA1C 5.8 (H) 06/20/2022 06:59 AM    HBA1C 5.6 12/14/2020 09:05 AM          Insomnia  Follow up; due for refills on Trazodone.   Doing well on current regimen.     Gastroesophageal reflux disease without esophagitis  Stable with Prilosec.   Requesting refills today.     At risk for sleep apnea  Due for sleep study given chronic daytime fatigue.     Hyperlipidemia  New to me on recent labs.   Not currently using medication - has used Lipitor in the past but ran out of medication.     Subclinical hypothyroidism  TPO antibody is present.   Strong FMHx of thyroid disease.            Current medicines (including changes today)  Current Outpatient Medications   Medication Sig Dispense Refill   • traZODone (DESYREL) 50 MG Tab TAKE 1 TO 2 TABLETS BY MOUTH AT BEDTIME 60 Tablet 2   • atorvastatin (LIPITOR) 20 MG Tab Take 1 Tablet by mouth every day. 90 Tablet 2   • omeprazole (PRILOSEC) 40 MG delayed-release capsule TAKE 1 CAPSULE BY MOUTH 30 MINS BEFORE FOOD 90 Capsule 2   • levothyroxine (SYNTHROID) 25 MCG Tab Take 1 Tablet by mouth every morning on an empty stomach. 90 Tablet 1   • triamcinolone acetonide (KENALOG) 0.1 % Cream Apply to affected area(s) twice daily 28.4 g 0   • Loratadine (CLARITIN PO) Take  by mouth.       No current facility-administered medications for this visit.     He  has a past medical history of Allergic rhinitis (1/9/2015), GERD (gastroesophageal reflux disease), Hyperlipidemia, Insomnia (1/13/2012), Mild depression (HCC) (7/6/2021), and  "Subclinical hypothyroidism (5/11/2022).    He has no past medical history of Anxiety, Arrhythmia, Asthma, Blood transfusion without reported diagnosis, Cancer ([facility]), CHF (congestive heart failure) (HCC), Clotting disorder (HCC), COPD (chronic obstructive pulmonary disease) (HCC), Diabetes, Diabetic neuropathy (HCC), Goiter, Heart attack (HCC), Heart murmur, Hypertension, Kidney disease, Migraine, Pulmonary emphysema (HCC), Seizure (HCC), or Stroke ([facility]).    ROS   As per HPI   No chest pain, no shortness of breath, no abdominal pain       Objective:     BP (!) 132/92 (BP Location: Left arm, Patient Position: Sitting, BP Cuff Size: Adult)   Pulse 67   Temp 36.6 °C (97.9 °F) (Temporal)   Resp 18   Ht 1.79 m (5' 10.47\")   Wt 98.4 kg (217 lb)   SpO2 100%  Body mass index is 30.72 kg/m².   Physical Exam:  Constitutional: Alert, no distress.  Skin: Warm, dry, good turgor, no rashes in visible areas.  Eye: Equal, round, conjunctiva clear, lids normal.  Neck: Trachea midline, no masses, no thyromegaly.  Respiratory: Unlabored respiratory effort, lungs clear to auscultation, no wheezes, no ronchi.  Cardiovascular: Normal S1, S2, no murmur, no edema  Lumbar: Mild TTP of R PSIS and R gluteal muscle. No TTP of lumbar paraspinal muscles. Subjective pain in the R glute with crossing of R leg over L.   Psych: Alert and oriented x3, normal affect and mood.    Assessment and Plan:   The following treatment plan was discussed    1. Insomnia, unspecified type  New to me; chronic and stable on current regimen.   Refills as written.   - traZODone (DESYREL) 50 MG Tab; TAKE 1 TO 2 TABLETS BY MOUTH AT BEDTIME  Dispense: 60 Tablet; Refill: 2    2. Hyperlipidemia, unspecified hyperlipidemia type  New to me; chronic  Restart lipitor as written.   Pt was educated on use and SEs of medication.  F/u in 3 months  - atorvastatin (LIPITOR) 20 MG Tab; Take 1 Tablet by mouth every day.  Dispense: 90 Tablet; Refill: 2    3. Gastroesophageal " reflux disease without esophagitis  New to me; chronic and stable on current regimen.   - omeprazole (PRILOSEC) 40 MG delayed-release capsule; TAKE 1 CAPSULE BY MOUTH 30 MINS BEFORE FOOD  Dispense: 90 Capsule; Refill: 2    4. Low testosterone level in male  - Referral to Urology    5. Anti-TPO antibodies present  New to me on recent labs.   Agreeable to start low dose levothyroxine at this time to see if this improved energy levels.   Pt was educated on use and SEs of medication.  F/u w/ repeat labs in 4 weeks.   - levothyroxine (SYNTHROID) 25 MCG Tab; Take 1 Tablet by mouth every morning on an empty stomach.  Dispense: 90 Tablet; Refill: 1  - TSH; Future  - FREE THYROXINE; Future  - T3 FREE; Future    6. Acute right-sided low back pain with right-sided sciatica  - Referral to Physical Therapy    7. At risk for sleep apnea  - Referral to Pulmonary and Sleep Medicine    8. Piriformis syndrome of right side  - Referral to Physical Therapy    9. Prediabetes  We advise to reduce sugar/carbohydrate/alcohol, eat more vegetables and lean meats such as fish/chicken/turkey. We also recommend 30 minutes of cardiovascular exercise most days of the week.  Continue to monitor.     10. Subclinical hypothyroidism  See above #5.       Followup: Return in about 4 weeks (around 7/29/2022), or if symptoms worsen or fail to improve.       Kimberly Alcala P.A.-C.  Supervising MD: Dr. Samuel Ashraf MD  07/01/22

## 2022-07-01 NOTE — ASSESSMENT & PLAN NOTE
Follow up;   Lab Results   Component Value Date/Time    HBA1C 5.8 (H) 06/20/2022 06:59 AM    HBA1C 5.6 12/14/2020 09:05 AM

## 2022-07-01 NOTE — ASSESSMENT & PLAN NOTE
New to me on recent labs.   Not currently using medication - has used Lipitor in the past but ran out of medication.

## 2022-08-02 NOTE — OP THERAPY EVALUATION
Outpatient Physical Therapy  INITIAL EVALUATION    [facility] Physical Therapy [place] [address] [facility], [address]  [place] [address]  Phone:  [phone]    Date of Evaluation: 08/03/2022    Patient: Danilo Sierra  YOB: 1975  MRN: [id number]     Referring Provider: Kimberly Alcala P.A.-C.  [address]  [place] [address]   Referring Diagnosis No admission diagnoses are documented for this encounter.     Time Calculation  Start time: 1530  Stop time: 1615 Time Calculation (min): 45 minutes         Chief Complaint: No chief complaint on file.    Visit Diagnoses     ICD-10-CM   1. Acute right-sided low back pain with right-sided sciatica  M54.41       Date of onset of impairment: 4/3/2022    Subjective:   History of Present Illness:     Date of onset:  4/3/2022    Mechanism of injury:  CMHx: pt indicates he was deadlifting and felt a tweak after the fact and noted that his back was achy. Days later the pain started to radiate down the leg. Denies N/T or B/B issues. He was working up to an event called [employer] where he was increasing his weight for this. He did warm up to try to get to this point for 1 mo to get stronger.     Pain Behavior  Sxs: ACHY/tight in lower back moderate at most; sharp pains in the posterior glute moderate more constant throughout the day but kind of stays. No longer in thigh but used to be. No N/T in lower limb    Aggs: forward flexion in lower back in mornings, has to adjust sitting at work due to glute pain, putting leg up seated in slump position increases pain. Sitting for glute 5 min at a time has to shift around and move up; usually 30 min to 1 hr and pain does calms quickly  NICOLAS to tie shoes    Eas: L S/L; massage felt good during but pain returned by the next day    24 hour: lower back in AM 10-15 min to subside normally, agg based rest of day     Sleep: trazadom helps; not waking    Irritability: low to mod    Yellow  "flags: moves \"slower\" but no issues    Imaging: none    PMHx: no hx of issues    Profession/Recreation: seated at work a few hrs/day, he does maintenance and facilities for  duties, does workouts and does weights and disappears with weightlifting. Does deadlifts/squats gym 5x/week with BBs and machines, for UE it is BB, machine, DBs    Goals: getting rid of pain                           Past Medical History:   Diagnosis Date   • Allergic rhinitis 1/9/2015   • GERD (gastroesophageal reflux disease)    • Hyperlipidemia     borderline, no meds   • Insomnia 1/13/2012   • Mild depression (HCC) 7/6/2021   • Subclinical hypothyroidism 5/11/2022     Past Surgical History:   Procedure Laterality Date   • DENTAL EXTRACTION(S)      [place] Teeth     Social History     Tobacco Use   • Smoking status: Never Smoker   • Smokeless tobacco: Never Used   Substance Use Topics   • Alcohol use: Yes     Alcohol/week: 1.2 oz     Types: 2 Cans of beer per week     Comment: 1-2 a week     Family and Occupational History     Socioeconomic History   • Marital status: Single     Spouse name: Not on file   • Number of children: Not on file   • Years of education: Not on file   • Highest education level: Some college, no degree   Occupational History   • Occupation:      Employer: [employer]       Objective     General Comments     Spine Comments   Standing:  Posture    AROM  Flexion; back first tight 50% on the way down, on glute if he goes further  Extension; WFL  RSB; tight back no sxs  LSB; tight back no sxs    Sitting:  Slump (+) R side; 20 deg short of knee ext (inc with head position)    Supine:  Neuro screens  DTR; WFL ea side  Dermatomes NT  Myotomes NT    Passive SLR not today    Hip Screen  NICOLAS; tighter R than L per pt but no pain  FADIR/Quadrant WFL  Flexion WFL  IR NT  ER WFL    SIJ [place] Cluster not today  Compression in S/L  Distraction  POSH/sacrotuberous stress test  Prone sacral P-A    Prone:  Hip PROM "   Extension WFL  IR NT today  ER NT today    Hip strength  Extension 4*/5, 4+/5  Abduction 4+/5    Joint play; no sxs with P-As    Prone Knee Bend (femoral nerve bias); no sxs    piriformis/soft tissues; TTP piriformis R none L            Therapeutic Exercises (CPT 58017):     1. UPOC 10/3/22      Therapeutic Exercise Summary: Home Exercise Program Created and Reviewed with patient  Press ups (slightly better with slump just at lower back until he holds it here; flexion back only)  n tensioner (helped slump, sxs just in back with flexion better as well per pt) x2-3min with belt        Time-based treatments/modalities:    Physical Therapy Timed Treatment Charges  Therapeutic exercise minutes (CPT 95192): 15 minutes      Assessment, Response and Plan:   Impairments: impaired functional mobility and lacks appropriate home exercise program    Assessment details:  PT finds s/sx consistent with referral with LBP with radiating quality/radicular nature. This is evident with  peripherilization of sxs subjectively and with AROM, positive neural tension screens, and location of sxs. Pt is negative for screens of radiculopathy/hard neurological findings but PT will monitor with any changes during course of care.  Prognosis: fair    Goals:   Short Term Goals:   -pt with MCID met with RMQ  -pt sits for >5 min without having to shift positions due to pain at work  -pt with AM sxs for 5min max without issues with tying shoes/etc    Short term goal time span:  2-4 weeks      Long Term Goals:    -pt with MCID met with RMQ for functional improvments  -pt sits for >30 min without pain for all work and IADLs  -pt without AM pain just tightness at most and no issues with L/S flexion  -pt deadlifts 50-75% BW without pain and good form to improve strength and endurance and reduce chance of re injury for gym activity  Long term goal time span:  6-8 weeks    Plan:   Therapy options:  Physical therapy treatment to continue  Planned therapy  interventions:  E Stim Attended (CPT 58431), E Stim Unattended (CPT 70145), Manual Therapy (CPT 42654), Mechanical Traction (CPT 66420), Neuromuscular Re-education (CPT 60189), Hot or Cold Pack Therapy (CPT 20534), Therapeutic Activities (CPT 90536) and Therapeutic Exercise (CPT 44888)  Frequency:  2x week  Duration in weeks:  8  Duration in visits:  16  Discussed with:  Patient      Functional Assessment Used  [place] Low Back Pain and Disability Score: 8.33     Referring provider co-signature:  I have reviewed this plan of care and my co-signature certifies the need for services.    Certification Period: 08/03/2022 to  10/05/22    Physician Signature: ________________________________ Date: ______________

## 2022-08-03 ENCOUNTER — PHYSICAL THERAPY (OUTPATIENT)
Dept: PHYSICAL THERAPY | Facility: REHABILITATION | Age: 47
End: 2022-08-03
Attending: PHYSICIAN ASSISTANT
Payer: COMMERCIAL

## 2022-08-03 DIAGNOSIS — M54.41 ACUTE RIGHT-SIDED LOW BACK PAIN WITH RIGHT-SIDED SCIATICA: ICD-10-CM

## 2022-08-03 PROCEDURE — 97162 PT EVAL MOD COMPLEX 30 MIN: CPT

## 2022-08-03 PROCEDURE — 97110 THERAPEUTIC EXERCISES: CPT

## 2022-08-05 ENCOUNTER — HOSPITAL ENCOUNTER (OUTPATIENT)
Dept: LAB | Facility: MEDICAL CENTER | Age: 47
End: 2022-08-05
Attending: PHYSICIAN ASSISTANT
Payer: COMMERCIAL

## 2022-08-05 DIAGNOSIS — R76.8 ANTI-TPO ANTIBODIES PRESENT: ICD-10-CM

## 2022-08-05 PROCEDURE — 84481 FREE ASSAY (FT-3): CPT

## 2022-08-05 PROCEDURE — 36415 COLL VENOUS BLD VENIPUNCTURE: CPT

## 2022-08-05 PROCEDURE — 84443 ASSAY THYROID STIM HORMONE: CPT

## 2022-08-05 PROCEDURE — 84439 ASSAY OF FREE THYROXINE: CPT

## 2022-08-06 LAB
T3FREE SERPL-MCNC: 2.91 PG/ML (ref 2–4.4)
T4 FREE SERPL-MCNC: 1.35 NG/DL (ref 0.93–1.7)
TSH SERPL DL<=0.005 MIU/L-ACNC: 2.32 UIU/ML (ref 0.38–5.33)

## 2022-08-09 ENCOUNTER — TELEMEDICINE (OUTPATIENT)
Dept: MEDICAL GROUP | Facility: LAB | Age: 47
End: 2022-08-09
Payer: COMMERCIAL

## 2022-08-09 VITALS — BODY MASS INDEX: 31.07 KG/M2 | WEIGHT: 217 LBS | HEIGHT: 70 IN

## 2022-08-09 DIAGNOSIS — R76.8 ANTI-TPO ANTIBODIES PRESENT: ICD-10-CM

## 2022-08-09 PROCEDURE — 99213 OFFICE O/P EST LOW 20 MIN: CPT | Mod: 95 | Performed by: PHYSICIAN ASSISTANT

## 2022-08-09 ASSESSMENT — FIBROSIS 4 INDEX: FIB4 SCORE: 0.83

## 2022-08-09 NOTE — PROGRESS NOTES
This evaluation was conducted via Zoom using secure and encrypted videoconferencing technology. The patient was in their home in the [facility].    The patient's identity was confirmed and verbal consent was obtained for this virtual visit.    Subjective:     CC: Thyroid lab follow-up  Danilo Sierra is a 47 y.o. male presenting to discuss the evaluation and management of subclinical hypothyroidism    Anti-TPO antibodies present  Follow-up, patient is currently on levothyroxine 25 mcg p.o. once daily.  Doing well on current regimen.  Thyroid labs are normal      ROS  See HPI  Constitutional: Negative for fever, chills and malaise/fatigue.   Respiratory: Negative for cough and shortness of breath.    Cardiovascular: Negative for leg swelling.   Skin: Negative for rash.   Psychiatric/Behavioral: Negative for depression.  The patient is not nervous/anxious.      No Known Allergies    Current medicines (including changes today)  Current Outpatient Medications   Medication Sig Dispense Refill   • traZODone (DESYREL) 50 MG Tab TAKE 1 TO 2 TABLETS BY MOUTH AT BEDTIME 60 Tablet 2   • atorvastatin (LIPITOR) 20 MG Tab Take 1 Tablet by mouth every day. 90 Tablet 2   • omeprazole (PRILOSEC) 40 MG delayed-release capsule TAKE 1 CAPSULE BY MOUTH 30 MINS BEFORE FOOD 90 Capsule 2   • levothyroxine (SYNTHROID) 25 MCG Tab Take 1 Tablet by mouth every morning on an empty stomach. 90 Tablet 1   • triamcinolone acetonide (KENALOG) 0.1 % Cream Apply to affected area(s) twice daily 28.4 g 0   • Loratadine (CLARITIN PO) Take  by mouth.       No current facility-administered medications for this visit.       He  has a past medical history of Allergic rhinitis (1/9/2015), GERD (gastroesophageal reflux disease), Hyperlipidemia, Insomnia (1/13/2012), Mild depression (HCC) (7/6/2021), and Subclinical hypothyroidism (5/11/2022).    He has no past medical history of Anxiety, Arrhythmia, Asthma, Blood transfusion without reported  "diagnosis, Cancer (HCC), CHF (congestive heart failure) (HCC), Clotting disorder (HCC), COPD (chronic obstructive pulmonary disease) (HCC), Diabetes, Diabetic neuropathy (HCC), Goiter, Heart attack (HCC), Heart murmur, Hypertension, Kidney disease, Migraine, Pulmonary emphysema (HCC), Seizure (HCC), or Stroke (HCC).  He  has a past surgical history that includes dental extraction(s).      Family History   Problem Relation Age of Onset   • Hyperlipidemia Mother    • Kidney Disease Mother    • Cancer Maternal Grandmother         lung CA/smoker   • Cancer Maternal Grandfather         colon   • Diabetes Maternal Uncle      Family Status   Relation Name Status   • PGMo unk Alive   • Mo  Alive   • Fa   at age 29        Accident - MVA   • Bro  Alive   • MGMo     • MGFa     • PGFa unk Alive   • MUnc  Alive       Patient Active Problem List    Diagnosis Date Noted   • Acute right-sided low back pain with right-sided sciatica 2022   • Subclinical hypothyroidism 2022   • Family history of colon cancer 2021   • Iron deficiency anemia 2021   • Anti-TPO antibodies present 2021   • At risk for sleep apnea 2021   • Mild depression (HCC) 2021   • Restless leg 2021   • Low testosterone in male 2020   • Family history of diabetes mellitus (DM) 2020   • Prediabetes 2020   • Hyperlipidemia 2016   • Gastroesophageal reflux disease without esophagitis 2016   • Allergic rhinitis 2015   • Insomnia 2012          Objective:   Ht 1.79 m (5' 10.47\")   Wt 98.4 kg (217 lb)   BMI 30.72 kg/m²     Physical Exam:  Constitutional: Alert, no distress, well-groomed.  Skin: No rashes in visible areas.  Eye: Round. Conjunctiva clear, lids normal. No icterus.   ENMT: Lips pink without lesions, good dentition, moist mucous membranes. Phonation normal.  Neck: No masses, no thyromegaly. Moves freely without pain.  Respiratory: Unlabored " respiratory effort, no cough or audible wheeze  Psych: Alert and oriented x3, normal affect and mood.       Assessment and Plan:   The following treatment plan was discussed:     1. Anti-TPO antibodies present  - TSH; Future  - FREE THYROXINE; Future  - T3 FREE; Future  Follow-up, thyroid labs are normal, doing well on levothyroxine 25 mcg p.o. once daily.  Recommend he repeat thyroid labs in 6 months.      Follow-up: Return in about 6 months (around 2/9/2023).    Kimberly Alcala P.A.-C.  Supervising MD: Dr. Samuel Ashraf MD  08/09/22

## 2022-08-09 NOTE — ASSESSMENT & PLAN NOTE
Follow-up, patient is currently on levothyroxine 25 mcg p.o. once daily.  Doing well on current regimen.  Thyroid labs are normal

## 2022-08-19 ENCOUNTER — TELEPHONE (OUTPATIENT)
Dept: MEDICAL GROUP | Facility: LAB | Age: 47
End: 2022-08-19
Payer: COMMERCIAL

## 2022-08-30 NOTE — OP THERAPY DAILY TREATMENT
Outpatient Physical Therapy  DAILY TREATMENT     [facility] Outpatient Physical Therapy [place] [address] [facility], [address]  [place] [address]  Phone:  [phone]    Date: 08/31/2022  Patient: Danilo Sierra  YOB: 1975  MRN: [id number]   Time Calculation  Start time: 0425  Stop time: 0510 Time Calculation (min): 45 minutes   Chief Complaint: No chief complaint on file.  Visit #: 2    SUBJECTIVE:  Pt with cont pain with flexion and worse than last visit. He notes it is just the back or posterior glute. No leg pains, N/T, weakness/etc. He indicates that he thinks he overdid trying to go back to the gym with things like barbell squats and had some LBP with trying to do a bent over row.     Sxs: ACHY/tight in lower back moderate at most; sharp pains in the posterior glute moderate more constant throughout the day but kind of stays. No longer in thigh but used to be. No N/T in lower limb     Aggs:   -forward flexion in lower back in mornings,   -has to adjust sitting at work due to glute pain, putting leg up seated in slump position increases pain. Sitting for glute 5 min at a time has to shift around and move up; usually 30 min to 1 hr and pain does calms quickly  -NICOLAS to tie shoes    OBJECTIVE:  Current objective measures:   AROM  Flexion; back first tight 50% on the way down, on glute if he goes further  Extension; WFL  RSB; tight back no sxs  LSB; tight back no sxs     Sitting:  Slump (+) R side; 20 deg short of knee ext (inc with head position)    AROM  Flexion; back first tight 50% on the way down, on glute if he goes further  Extension; WFL  RSB; tight back no sxs  LSB; tight back no sxs     Sitting:  Slump (+) R side; 20 deg short of knee ext (inc with head position)  Hip strength  Extension 4*/5, 4+/5    TTP piriformis; no sxs P-As        Therapeutic Exercises (CPT 09424):     1. 1. UPOC 10/3/22      Therapeutic Exercise Summary: Therapeutic Exercise Summary: Home Exercise Program Created and  "Reviewed with patient  Cat/cow (minor pn) x2 min  N gliders x2 min  Bridges on ball x1 min   SL bridges (one on ball/bent up; and no back pain) 10x10\"H  TA brace 90/90 hold x39\" till fatigue    Deferred below with pain level  Press ups (slightly better with slump just at lower back until he holds it here; flexion back only)  n tensioner (helped slump, sxs just in back with flexion better as well per pt) x2-3min with belt    Therapeutic Treatments and Modalities:     1. Manual Therapy (CPT 10983), S/L P-As GIII, prone P-As GIII, piriformis release, NC in SLUMP  Time-based treatments/modalities:  Physical Therapy Timed Treatment Charges  Manual therapy minutes (CPT 65202): 15 minutes  Therapeutic exercise minutes (CPT 25978): 25 minutes  ASSESSMENT:   Response to treatment: pt with similar impairments from eval; worse in AROM most likely with overdoing things at the gym with a flexion load. Edu on graded activity here and cont to address glutes as well as SLR mobility and L/S stretching. Will assess response next visit.    PLAN/RECOMMENDATIONS:   Plan for treatment: therapy treatment to continue next visit.  Planned interventions for next visit: continue with current treatment.         "

## 2022-08-31 ENCOUNTER — PHYSICAL THERAPY (OUTPATIENT)
Dept: PHYSICAL THERAPY | Facility: REHABILITATION | Age: 47
End: 2022-08-31
Attending: PHYSICIAN ASSISTANT
Payer: COMMERCIAL

## 2022-08-31 DIAGNOSIS — M54.41 ACUTE RIGHT-SIDED LOW BACK PAIN WITH RIGHT-SIDED SCIATICA: ICD-10-CM

## 2022-08-31 PROCEDURE — 97110 THERAPEUTIC EXERCISES: CPT

## 2022-08-31 PROCEDURE — 97140 MANUAL THERAPY 1/> REGIONS: CPT

## 2022-09-02 ENCOUNTER — HOSPITAL ENCOUNTER (OUTPATIENT)
Dept: LAB | Facility: MEDICAL CENTER | Age: 47
End: 2022-09-02
Attending: PHYSICIAN ASSISTANT
Payer: COMMERCIAL

## 2022-09-02 LAB
BASOPHILS # BLD AUTO: 0.9 % (ref 0–1.8)
BASOPHILS # BLD: 0.07 K/UL (ref 0–0.12)
EOSINOPHIL # BLD AUTO: 0.37 K/UL (ref 0–0.51)
EOSINOPHIL NFR BLD: 4.8 % (ref 0–6.9)
ERYTHROCYTE [DISTWIDTH] IN BLOOD BY AUTOMATED COUNT: 43.4 FL (ref 35.9–50)
HCT VFR BLD AUTO: 46.6 % (ref 42–52)
HGB BLD-MCNC: 16.2 G/DL (ref 14–18)
IMM GRANULOCYTES # BLD AUTO: 0.01 K/UL (ref 0–0.11)
IMM GRANULOCYTES NFR BLD AUTO: 0.1 % (ref 0–0.9)
LYMPHOCYTES # BLD AUTO: 3.19 K/UL (ref 1–4.8)
LYMPHOCYTES NFR BLD: 41.4 % (ref 22–41)
MCH RBC QN AUTO: 31.7 PG (ref 27–33)
MCHC RBC AUTO-ENTMCNC: 34.8 G/DL (ref 33.7–35.3)
MCV RBC AUTO: 91.2 FL (ref 81.4–97.8)
MONOCYTES # BLD AUTO: 0.58 K/UL (ref 0–0.85)
MONOCYTES NFR BLD AUTO: 7.5 % (ref 0–13.4)
NEUTROPHILS # BLD AUTO: 3.49 K/UL (ref 1.82–7.42)
NEUTROPHILS NFR BLD: 45.3 % (ref 44–72)
NRBC # BLD AUTO: 0 K/UL
NRBC BLD-RTO: 0 /100 WBC
PLATELET # BLD AUTO: 230 K/UL (ref 164–446)
PMV BLD AUTO: 10.5 FL (ref 9–12.9)
RBC # BLD AUTO: 5.11 M/UL (ref 4.7–6.1)
TESTOST SERPL-MCNC: 359 NG/DL (ref 175–781)
WBC # BLD AUTO: 7.7 K/UL (ref 4.8–10.8)

## 2022-09-02 PROCEDURE — 84403 ASSAY OF TOTAL TESTOSTERONE: CPT

## 2022-09-02 PROCEDURE — 85025 COMPLETE CBC W/AUTO DIFF WBC: CPT

## 2022-09-02 PROCEDURE — 36415 COLL VENOUS BLD VENIPUNCTURE: CPT

## 2022-09-07 NOTE — OP THERAPY DAILY TREATMENT
"  Outpatient Physical Therapy  DAILY TREATMENT     [facility] Outpatient Physical Therapy [place] [address] [facility], [address]  [place] [address]  Phone:  [phone]    Date: 09/08/2022  Patient: Danilo Sierra  YOB: 1975  MRN: [id number]   Time Calculation  Start time: 0430  Stop time: 0515 Time Calculation (min): 45 minutes   Chief Complaint: No chief complaint on file.  Visit #: 3    SUBJECTIVE:   Pt with cont pain with flexion. Notes hasn't done much HEP. Still there worst in AM in back or hip on R side.    Sxs: ACHY/tight in lower back moderate at most; sharp pains in the posterior glute moderate more constant throughout the day but kind of stays. No longer in thigh but used to be. No N/T in lower limb     Aggs:   -forward flexion in lower back in mornings,   -has to adjust sitting at work due to glute pain, putting leg up seated in slump position increases pain. Sitting for glute 5 min at a time has to shift around and move up; usually 30 min to 1 hr and pain does calms quickly  -NICOLAS to tie shoes    OBJECTIVE:   Current objective measures:   AROM  Flexion; back first tight 50% on the way down, on glute if he goes further  Extension; WFL  RSB; tight back no sxs  LSB; tight back no sxs     Sitting:  Slump (+) R side; 20 deg short of knee ext (inc with head position)    TTP piriformis; no sxs P-As        Therapeutic Exercises (CPT 52530):     1. 1. UPOC 10/3/22      Therapeutic Exercise Summary: Therapeutic Exercise Summary: Home Exercise Program Created and Reviewed with patient  Cat/cow (minor pn) x2 min  N gliders x2 min  TA brace 90/90 hold x39\" till fatigue    Castaneda carry 3L x150 ft 35#KB  Bird dog x10ea, with row x10ea 35#KB  SL bridge on ball 2x1'ea  1-2/10 pain during; fatigued after in back per pt        Deferred below with pain level  Press ups (slightly better with slump just at lower back until he holds it here; flexion back only)  n tensioner (helped slump, sxs just in back with " flexion better as well per pt) x2-3min with belt    Therapeutic Treatments and Modalities:     1. Manual Therapy (CPT 39939), S/L P-As GIII, piriformis stretch, neural glides. With pt permisison GV HVLAT in S/L lower lumbar, NC in SLUMP  Time-based treatments/modalities:  Physical Therapy Timed Treatment Charges  Manual therapy minutes (CPT 52833): 10 minutes  Therapeutic exercise minutes (CPT 60769): 35 minutes  ASSESSMENT: pt with similar impairments, responds to neural tensioner and piriformis stretching today. Cont to address here as able. Had consistent 1-2/10 pain during exercises that calms after. Will monitor response for next visit.    PLAN/RECOMMENDATIONS:   Plan for treatment: therapy treatment to continue next visit.  Planned interventions for next visit: continue with current treatment.

## 2022-09-08 ENCOUNTER — PHYSICAL THERAPY (OUTPATIENT)
Dept: PHYSICAL THERAPY | Facility: REHABILITATION | Age: 47
End: 2022-09-08
Attending: PHYSICIAN ASSISTANT
Payer: COMMERCIAL

## 2022-09-08 DIAGNOSIS — M54.41 ACUTE RIGHT-SIDED LOW BACK PAIN WITH RIGHT-SIDED SCIATICA: ICD-10-CM

## 2022-09-08 PROCEDURE — 97110 THERAPEUTIC EXERCISES: CPT

## 2022-09-08 PROCEDURE — 97140 MANUAL THERAPY 1/> REGIONS: CPT

## 2022-09-13 ENCOUNTER — APPOINTMENT (OUTPATIENT)
Dept: PHYSICAL THERAPY | Facility: REHABILITATION | Age: 47
End: 2022-09-13
Attending: PHYSICIAN ASSISTANT
Payer: COMMERCIAL

## 2022-09-13 NOTE — OP THERAPY DAILY TREATMENT
"  Outpatient Physical Therapy  DAILY TREATMENT     [facility] Outpatient Physical Therapy [place] [address] [facility], [address]  [place] [address]  Phone:  [phone]    Date: 09/13/2022  Patient: Danilo Sierra  YOB: 1975  MRN: [id number]   Time Calculation           Chief Complaint: No chief complaint on file.  Visit #: 4    SUBJECTIVE: ***  Pt with cont pain with flexion. Notes hasn't done much HEP. Still there worst in AM in back or hip on R side.    Sxs: ACHY/tight in lower back moderate at most; sharp pains in the posterior glute moderate more constant throughout the day but kind of stays. No longer in thigh but used to be. No N/T in lower limb     Aggs:   -forward flexion in lower back in mornings,   -has to adjust sitting at work due to glute pain, putting leg up seated in slump position increases pain. Sitting for glute 5 min at a time has to shift around and move up; usually 30 min to 1 hr and pain does calms quickly  -NICOLAS to tie shoes    OBJECTIVE: ***  Current objective measures:   AROM  Flexion; back first tight 50% on the way down, on glute if he goes further  Extension; WFL  RSB; tight back no sxs  LSB; tight back no sxs     Sitting:  Slump (+) R side; 20 deg short of knee ext (inc with head position)    TTP piriformis; no sxs P-As        Therapeutic Exercises (CPT 61457):     1. 1. UPOC 10/3/22      Therapeutic Exercise Summary: Therapeutic Exercise Summary: Home Exercise Program Created and Reviewed with patient  Cat/cow (minor pn) x2 min  N gliders x2 min  TA brace 90/90 hold x39\" till fatigue    Castaneda carry 3L x150 ft 35#KB  Bird dog x10ea, with row x10ea 35#KB  SL bridge on ball 2x1'ea  1-2/10 pain during; fatigued after in back per pt        Deferred below with pain level  Press ups (slightly better with slump just at lower back until he holds it here; flexion back only)  n tensioner (helped slump, sxs just in back with flexion better as well per pt) x2-3min with " belt    Therapeutic Treatments and Modalities:     1. Manual Therapy (CPT 55906), S/L P-As GIII, piriformis stretch, neural glides. With pt permisison GV HVLAT in S/L lower lumbar, NC in SLUMP  Time-based treatments/modalities:     ASSESSMENT: *** pt with similar impairments, responds to neural tensioner and piriformis stretching today. Cont to address here as able. Had consistent 1-2/10 pain during exercises that calms after. Will monitor response for next visit.    PLAN/RECOMMENDATIONS:   Plan for treatment: therapy treatment to continue next visit.  Planned interventions for next visit: continue with current treatment.

## 2022-10-05 NOTE — OP THERAPY DAILY TREATMENT
"  Outpatient Physical Therapy  DAILY TREATMENT     [facility] Outpatient Physical Therapy [address] [facility], [address]  [place] [address]  Phone:  [phone]    Date: 10/06/2022  Patient: Danilo Sierra  YOB: 1975  MRN: [id number]   Time Calculation           Chief Complaint: No chief complaint on file.  Visit #: 4    SUBJECTIVE: ***DO PN***  Pt with cont pain with flexion. Notes hasn't done much HEP. Still there worst in AM in back or hip on R side.    Sxs: ACHY/tight in lower back moderate at most; sharp pains in the posterior glute moderate more constant throughout the day but kind of stays. No longer in thigh but used to be. No N/T in lower limb     Aggs:   -forward flexion in lower back in mornings,   -has to adjust sitting at work due to glute pain, putting leg up seated in slump position increases pain. Sitting for glute 5 min at a time has to shift around and move up; usually 30 min to 1 hr and pain does calms quickly  -NICOLAS to tie shoes    OBJECTIVE: ***  Current objective measures:   AROM  Flexion; back first tight 50% on the way down, on glute if he goes further  Extension; WFL  RSB; tight back no sxs  LSB; tight back no sxs     Sitting:  Slump (+) R side; 20 deg short of knee ext (inc with head position)    TTP piriformis; no sxs P-As        Therapeutic Exercises (CPT 34134):     1. 1. UPOC 10/3/22      Therapeutic Exercise Summary: Therapeutic Exercise Summary: Home Exercise Program Created and Reviewed with patient  Cat/cow (minor pn) x2 min  N gliders x2 min  TA brace 90/90 hold x39\" till fatigue    Castaneda carry 3L x150 ft 35#KB  Bird dog x10ea, with row x10ea 35#KB  SL bridge on ball 2x1'ea  1-2/10 pain during; fatigued after in back per pt        Deferred below with pain level  Press ups (slightly better with slump just at lower back until he holds it here; flexion back only)  n tensioner (helped slump, sxs just in back with flexion better as well per pt) x2-3min with " belt    Therapeutic Treatments and Modalities:     1. Manual Therapy (CPT 70085), S/L P-As GIII, piriformis stretch, neural glides. With pt permisison GV HVLAT in S/L lower lumbar, NC in SLUMP  Time-based treatments/modalities:     ASSESSMENT: *** pt with similar impairments, responds to neural tensioner and piriformis stretching today. Cont to address here as able. Had consistent 1-2/10 pain during exercises that calms after. Will monitor response for next visit.    PLAN/RECOMMENDATIONS:   Plan for treatment: therapy treatment to continue next visit.  Planned interventions for next visit: continue with current treatment.

## 2022-10-06 ENCOUNTER — APPOINTMENT (OUTPATIENT)
Dept: PHYSICAL THERAPY | Facility: REHABILITATION | Age: 47
End: 2022-10-06
Attending: PHYSICIAN ASSISTANT
Payer: COMMERCIAL

## 2022-10-06 DIAGNOSIS — G47.00 INSOMNIA, UNSPECIFIED TYPE: ICD-10-CM

## 2022-10-06 RX ORDER — TRAZODONE HYDROCHLORIDE 50 MG/1
TABLET ORAL
Qty: 60 TABLET | Refills: 2 | Status: SHIPPED | OUTPATIENT
Start: 2022-10-06 | End: 2023-01-10

## 2022-10-06 NOTE — TELEPHONE ENCOUNTER
Received request via: Pharmacy    Was the patient seen in the last year in this department? Yes  8/9/22  Does the patient have an active prescription (recently filled or refills available) for medication(s) requested? No

## 2022-11-02 ENCOUNTER — TELEPHONE (OUTPATIENT)
Dept: SCHEDULING | Facility: IMAGING CENTER | Age: 47
End: 2022-11-02

## 2022-11-08 ENCOUNTER — TELEPHONE (OUTPATIENT)
Dept: PHYSICAL THERAPY | Facility: REHABILITATION | Age: 47
End: 2022-11-08
Payer: COMMERCIAL

## 2022-11-08 NOTE — OP THERAPY DISCHARGE SUMMARY
Outpatient Physical Therapy  DISCHARGE SUMMARY NOTE      [facility] Physical Therapy [address], [address]  [place] [address]  Phone:  [phone]    Date of Visit: 11/08/2022    Patient: Danilo Sierra  YOB: 1975  MRN: [id number]     Referring Provider: Kimberly Alcala PA-C   Referring Diagnosis No admission diagnoses are documented for this encounter.         Functional Assessment Used        Your patient is being discharged from Physical Therapy with the following comments:   Patient cancelled or missed more than 2 scheduled appointments/non-compliant    Comments:  Patient has failed to schedule follow up visits; no contact since last visit and lapse in care >30 days at this time. Due to company policy patient will be discharged and in need of a new referral should skilled PT care be needed. Recommend follow up with PCP for ongoing issues.       Saran Avalos, PT    Date: 11/8/2022

## 2022-11-26 ENCOUNTER — OFFICE VISIT (OUTPATIENT)
Dept: URGENT CARE | Facility: CLINIC | Age: 47
End: 2022-11-26
Payer: COMMERCIAL

## 2022-11-26 VITALS
HEIGHT: 70 IN | BODY MASS INDEX: 31.28 KG/M2 | WEIGHT: 218.5 LBS | SYSTOLIC BLOOD PRESSURE: 142 MMHG | RESPIRATION RATE: 14 BRPM | TEMPERATURE: 97.6 F | DIASTOLIC BLOOD PRESSURE: 102 MMHG | OXYGEN SATURATION: 96 % | HEART RATE: 99 BPM

## 2022-11-26 DIAGNOSIS — M54.41 ACUTE RIGHT-SIDED LOW BACK PAIN WITH RIGHT-SIDED SCIATICA: ICD-10-CM

## 2022-11-26 DIAGNOSIS — R03.0 ELEVATED BLOOD PRESSURE READING IN OFFICE WITHOUT DIAGNOSIS OF HYPERTENSION: ICD-10-CM

## 2022-11-26 PROCEDURE — 99213 OFFICE O/P EST LOW 20 MIN: CPT | Performed by: NURSE PRACTITIONER

## 2022-11-26 RX ORDER — METHYLPREDNISOLONE 4 MG/1
TABLET ORAL
Qty: 21 TABLET | Refills: 0 | Status: SHIPPED | OUTPATIENT
Start: 2022-11-26 | End: 2022-12-14

## 2022-11-26 RX ORDER — KETOROLAC TROMETHAMINE 30 MG/ML
30 INJECTION, SOLUTION INTRAMUSCULAR; INTRAVENOUS ONCE
Status: COMPLETED | OUTPATIENT
Start: 2022-11-26 | End: 2022-11-26

## 2022-11-26 RX ORDER — CYCLOBENZAPRINE HCL 10 MG
10 TABLET ORAL 3 TIMES DAILY PRN
Qty: 21 TABLET | Refills: 0 | Status: SHIPPED | OUTPATIENT
Start: 2022-11-26 | End: 2022-12-03

## 2022-11-26 RX ADMIN — KETOROLAC TROMETHAMINE 30 MG: 30 INJECTION, SOLUTION INTRAMUSCULAR; INTRAVENOUS at 20:46

## 2022-11-26 ASSESSMENT — FIBROSIS 4 INDEX: FIB4 SCORE: 0.86

## 2022-11-27 NOTE — PROGRESS NOTES
Patient has consented to treatment and for use of patient information for treatment and billing purposes.    Date: 11/26/22     Arrival Mode: Private Vehicle    Chief Complaint:    Chief Complaint   Patient presents with    Back Pain     X 3 days, lower back pain        History of Present Illness: 47 y.o.  male presents to clinic with chronic low back pain with acute exacerbation.  Patient states approximately 3 months ago he started having the low back pain with sciatica of the right side.  Patient states that he did attend physical therapy which did not significantly help his sciatica, did have massage therapy which states was helpful.  Patient also did seek care at a chiropractor which she states was not significantly helpful.  Patient states he did feel that his back pain was slowly improving although not 100%.  Although yesterday he did have a forceful cough which caused a pinch in his right low back and he states he has had continued back pain with pain down into his right glutes since that cough.  Presents to clinic for evaluation.  Patient did take ibuprofen yesterday which he states was helpful.  Has not taken any ibuprofen today.     Patient denies nausea vomiting diarrhea.  Denies burning with urination flank pain or abdominal pain. Denies cough, shortness of breath or lower leg swelling. Patient denies direct trauma, bilateral or progressive neurological deficits as discussed, new urinary retention,  fecal incontinence or saddle anesthesia, no history of cancer , IV drug use, fevers, recent surgical procedures or the inability to walk. Patient denies ripping sensation in back or history of AAA, coldness or color change in extremity.     ROS:    As stated in HPI     Pertinent Medical History:  Past Medical History:   Diagnosis Date    Allergic rhinitis 1/9/2015    GERD (gastroesophageal reflux disease)     Hyperlipidemia     borderline, no meds    Insomnia 1/13/2012    Mild depression 7/6/2021     Subclinical hypothyroidism 5/11/2022        Pertinent Surgical History:  Past Surgical History:   Procedure Laterality Date    DENTAL EXTRACTION(S)      [place] Teeth        Pertinent Medications:    Current Outpatient Medications on File Prior to Visit   Medication Sig Dispense Refill    traZODone (DESYREL) 50 MG Tab TAKE 1 TO 2 TABLETS BY MOUTH AT BEDTIME 60 Tablet 2    atorvastatin (LIPITOR) 20 MG Tab Take 1 Tablet by mouth every day. 90 Tablet 2    omeprazole (PRILOSEC) 40 MG delayed-release capsule TAKE 1 CAPSULE BY MOUTH 30 MINS BEFORE FOOD 90 Capsule 2    levothyroxine (SYNTHROID) 25 MCG Tab Take 1 Tablet by mouth every morning on an empty stomach. 90 Tablet 1    Loratadine (CLARITIN PO) Take  by mouth.      triamcinolone acetonide (KENALOG) 0.1 % Cream Apply to affected area(s) twice daily (Patient not taking: Reported on 11/26/2022) 28.4 g 0     No current facility-administered medications on file prior to visit.        Allergies:    Patient has no known allergies.     Social History:  Social History     Tobacco Use    Smoking status: Never    Smokeless tobacco: Never   Vaping Use    Vaping Use: Never used   Substance Use Topics    Alcohol use: Yes     Alcohol/week: 1.2 oz     Types: 2 Cans of beer per week     Comment: 1-2 a week    Drug use: No        No LMP for male patient.           Physical Exam:    Vitals:    11/26/22 1914   BP: (!) 142/102   Pulse: 99   Resp: 14   Temp: 36.4 °C (97.6 °F)   SpO2: 96%             Physical Exam  Constitutional:       General: He is not in acute distress.     Appearance: Normal appearance. He is not toxic-appearing.   Abdominal:      Tenderness: There is no abdominal tenderness. There is no right CVA tenderness or left CVA tenderness.   Musculoskeletal:      Cervical back: Normal.      Thoracic back: Normal.      Lumbar back: Positive right straight leg raise test.        Back:    Neurological:      Mental Status: He is alert.            Diagnostics:      None   Medical  Decision making and clinic course :  I personally reviewed prior external notes and test results pertinent to today's visit.     Differential diagnosis includes but is not limited to, acute cauda equina syndrome, acute spinal epidural abscess, acute compression fracture, acute pyelonephritis, AAA, abdominal etiology, acute musculoskeletal strain or sprain. Lower index of suspicion for acute cauda equina as patient has no caudal paresthesia and denies urinary retention or stool incontinence.  Low index of suspicion for acute epidural abscess as patient has no history of IV drug use or fever. Reports no CVA tenderness on exam and denies urinary symptoms so less likely pyelonephritis.  Low index of suspicion for AAA or dissecting aortic aneurysm as patient denies any history of AAA or a ripping or tearing sensation, as well as exam findings are not consistent.  Low suspicion for abdominal etiology as patient has no abdominal tenderness or abdominal complaints.  Also a low index of suspicion for compression fracture as there was no reported trauma.  Physical exam findings are reproducible to palpation and does increase my index of suspicion for acute musculoskeletal etiology of the pain, nonspecific back pain.     Shared decision-making was utilized with patient for treatment plan.  We will give in clinic Toradol for pain in clinic.  Will send for Medrol Dosepak as patient has tried multiple measures to relieve the pain which have not necessarily been helpful.  Do feel this will help decrease inflammation.  We will also send for Flexeril muscle relaxers.  Did discuss this is a sedating medication do not drive or operate heavy machinery while taking this medication.      The patient remained stable during the urgent care visit.    Plan:    Medication discussed included indication for use and the potential  benefits and side effects.       1. Acute right-sided low back pain with right-sided sciatica  =  - ketorolac  (TORADOL) injection 30 mg  - methylPREDNISolone (MEDROL DOSEPAK) 4 MG Tablet Therapy Pack; Follow schedule on package instructions.  Dispense: 21 Tablet; Refill: 0  - cyclobenzaprine (FLEXERIL) 10 mg Tab; Take 1 Tablet by mouth 3 times a day as needed for Muscle Spasms for up to 7 days.  Dispense: 21 Tablet; Refill: 0    2. Elevated blood pressure reading in office without diagnosis of hypertension       Printed education was provided regarding the aforementioned assessments.  All of the patient's questions were answered to their satisfaction at the time of discharge.    Follow up:      Patient was encouraged to monitor symptoms closely. Those signs and symptoms which would warrant concern and mandate seeking a higher level of service through the emergency department discussed at length and included in discharge papers.  Patient stated agreement and understanding of this plan of care.    Disposition:  Home in stable condition       Voice Recognition Disclaimer:  Portions of this document were created using voice recognition software. The software does have a chance of producing errors of grammar and possibly content. I have made every reasonable attempt to correct obvious errors, but there may be errors of grammar and possibly content that I did not discover before finalizing the documentation.    [email or web address].

## 2022-12-14 ENCOUNTER — OFFICE VISIT (OUTPATIENT)
Dept: MEDICAL GROUP | Facility: MEDICAL CENTER | Age: 47
End: 2022-12-14
Payer: COMMERCIAL

## 2022-12-14 VITALS
OXYGEN SATURATION: 96 % | DIASTOLIC BLOOD PRESSURE: 98 MMHG | SYSTOLIC BLOOD PRESSURE: 146 MMHG | TEMPERATURE: 98.4 F | BODY MASS INDEX: 31.21 KG/M2 | HEIGHT: 70 IN | WEIGHT: 218 LBS | HEART RATE: 77 BPM | RESPIRATION RATE: 16 BRPM

## 2022-12-14 DIAGNOSIS — I10 BENIGN ESSENTIAL HTN: ICD-10-CM

## 2022-12-14 DIAGNOSIS — Z91.89 AT RISK FOR SLEEP APNEA: ICD-10-CM

## 2022-12-14 DIAGNOSIS — E78.00 PURE HYPERCHOLESTEROLEMIA: ICD-10-CM

## 2022-12-14 DIAGNOSIS — G25.81 RESTLESS LEG: ICD-10-CM

## 2022-12-14 DIAGNOSIS — M79.18 RIGHT BUTTOCK PAIN: ICD-10-CM

## 2022-12-14 DIAGNOSIS — R79.89 LOW TESTOSTERONE IN MALE: ICD-10-CM

## 2022-12-14 DIAGNOSIS — R73.03 PREDIABETES: ICD-10-CM

## 2022-12-14 DIAGNOSIS — K21.00 GASTROESOPHAGEAL REFLUX DISEASE WITH ESOPHAGITIS WITHOUT HEMORRHAGE: ICD-10-CM

## 2022-12-14 PROBLEM — E06.3 HASHIMOTO'S DISEASE: Status: ACTIVE | Noted: 2021-09-09

## 2022-12-14 PROBLEM — M54.41 ACUTE RIGHT-SIDED LOW BACK PAIN WITH RIGHT-SIDED SCIATICA: Status: RESOLVED | Noted: 2022-07-01 | Resolved: 2022-12-14

## 2022-12-14 PROBLEM — E03.8 SUBCLINICAL HYPOTHYROIDISM: Status: RESOLVED | Noted: 2022-05-11 | Resolved: 2022-12-14

## 2022-12-14 PROBLEM — E03.8 HYPOTHYROIDISM DUE TO HASHIMOTO'S THYROIDITIS: Status: ACTIVE | Noted: 2021-09-09

## 2022-12-14 PROBLEM — D50.9 IRON DEFICIENCY ANEMIA: Status: RESOLVED | Noted: 2021-09-09 | Resolved: 2022-12-14

## 2022-12-14 PROCEDURE — 99214 OFFICE O/P EST MOD 30 MIN: CPT | Performed by: INTERNAL MEDICINE

## 2022-12-14 RX ORDER — TESTOSTERONE CYPIONATE 200 MG/ML
INJECTION, SOLUTION INTRAMUSCULAR
COMMUNITY

## 2022-12-14 RX ORDER — OMEPRAZOLE 40 MG/1
CAPSULE, DELAYED RELEASE ORAL
Qty: 90 CAPSULE | Refills: 3 | Status: SHIPPED | OUTPATIENT
Start: 2022-12-14 | End: 2023-02-15

## 2022-12-14 RX ORDER — TESTOSTERONE CYPIONATE 200 MG/ML
INJECTION, SOLUTION INTRAMUSCULAR
COMMUNITY
Start: 2022-10-14 | End: 2023-06-14

## 2022-12-14 ASSESSMENT — FIBROSIS 4 INDEX: FIB4 SCORE: 0.86

## 2022-12-15 NOTE — PATIENT INSTRUCTIONS
Here's home exercise program I recommend.    [email or web address]

## 2022-12-15 NOTE — PROGRESS NOTES
Established Patient    Danilo presents today with the following:    CC: Low back pain, review of chronic medical problems    HPI:   Danilo is a 47 y.o. male who came in for above.    Over the past year, he was having off-and-on back flare in right buttock area, asso: with some tingling in right foot.  He has tried physical therapy.  During last flare, he went to urgent care, was given ketorolac, Medrol pack and Flexeril which has helped to more acceptable pain level.  He has not been able to exercise as usual.  This has led to weight gain.    He is concerned about his blood pressure.  It seems that he has been increasing slowly over the past 2 years.    He did EGD and colonoscopy.  EGD showed esophagitis secondary to GERD.  He was told to increase omeprazole to 40 mg daily and he has been doing well with that.      ROS:   As above    Patient Active Problem List    Diagnosis Date Noted    Family history of colon cancer 09/09/2021    Hypothyroidism due to Hashimoto's thyroiditis 09/09/2021    At risk for sleep apnea 07/06/2021    Mild depression 07/06/2021    Restless leg 07/06/2021    Low testosterone in male 07/14/2020    Family history of diabetes mellitus (DM) 07/13/2020    Prediabetes 07/13/2020    Pure hypercholesterolemia 01/08/2016    Gastroesophageal reflux disease without esophagitis 01/08/2016    Allergic rhinitis 01/09/2015    Insomnia 01/13/2012       Current Outpatient Medications   Medication Sig Dispense Refill    testosterone cypionate (DEPO-TESTOSTERONE) 200 MG/ML Solution injection testosterone cypionate 200 mg/mL intramuscular oil   INJECT 1 ML EVERY 2 WEEKS BY INTRAMUSCULAR ROUTE      omeprazole (PRILOSEC) 40 MG delayed-release capsule TAKE 1 CAPSULE BY MOUTH 30 MINS BEFORE FOOD 90 Capsule 3    traZODone (DESYREL) 50 MG Tab TAKE 1 TO 2 TABLETS BY MOUTH AT BEDTIME 60 Tablet 2    atorvastatin (LIPITOR) 20 MG Tab Take 1 Tablet by mouth every day. 90 Tablet 2    levothyroxine (SYNTHROID)  "25 MCG Tab Take 1 Tablet by mouth every morning on an empty stomach. 90 Tablet 1    Loratadine (CLARITIN PO) Take  by mouth.      TRAZODONE & DIET MANAGE PROD PO trazodone (Patient not taking: Reported on 12/14/2022)      testosterone cypionate (DEPO-TESTOSTERONE) 200 MG/ML Solution injection INJECT 1 ML EVERY 2 WEEKS BY INTRAMUSCULAR ROUTE PA DENIED (Patient not taking: Reported on 12/14/2022)       No current facility-administered medications for this visit.         BP (!) 146/98 (BP Location: Left arm, Patient Position: Sitting, BP Cuff Size: Large adult)   Pulse 77   Temp 36.9 °C (98.4 °F) (Tympanic)   Resp 16   Ht 1.778 m (5' 10\")   Wt 98.9 kg (218 lb)   SpO2 96%   BMI 31.28 kg/m²     Physical Exam  General: Alert and oriented, No apparent distress.  Neck: Supple. No cervical or supraclavicular lymphadenopathy noted. Thyroid not enlarged.  Lungs: Clear to auscultation bilaterally without any wheezing, crepitations.  Cardiovascular: Regular rate and rhythm. No murmurs, rubs or gallops.  Abdomen: Bowel sound +, soft, non tender, no rebound or guarding, no palpable organomegaly  Extremities: No clubbing, cyanosis, edema.    Assessment and Plan    1. Right buttock pain  No worrisome features.  -Recommended to do home exercises. If it still limits his physical activity, recommended to visit physiatrist.    2. Benign essential HTN  - no compelling indication to start medication currently.  He is eager to modify lifestyle and avoid medication if possible.  - Recommended to restart regular exercises, to lose 5 to 10 pounds.  Recommended to buy a home blood pressure cuff to monitor at home.  Target less than 140/90.  If consistently higher, he needs to come back sooner.    3. Gastroesophageal reflux disease with esophagitis without hemorrhage  -Controlled.  Refilled   omeprazole (PRILOSEC) 40 MG delayed-release capsule; TAKE 1 CAPSULE BY MOUTH 30 MINS BEFORE FOOD  Dispense: 90 Capsule; Refill: 3    4. Pure " hypercholesterolemia  Last LDL was 200s.  Atorvastatin 20 mg was started.  Recheck lipid profile.  - Comp Metabolic Panel; Future  - Lipid Profile; Future    5. Prediabetes  - HEMOGLOBIN A1C; Future    6. Low testosterone in male  On testosterone injection, managed by urologist.  - CBC WITH DIFFERENTIAL; Future    7. Restless leg  - no significant burden.  Recommended to proceed with sleep study    8. At risk for sleep apnea  -Discussed complications of sleep apnea. He has been delaying due to fear of needing CPAP.  Discussed existence of other possible treatments.           Return in about 6 months (around 6/14/2023).         Signed by: Kala Monzon M.D.

## 2022-12-21 ENCOUNTER — PATIENT MESSAGE (OUTPATIENT)
Dept: MEDICAL GROUP | Facility: MEDICAL CENTER | Age: 47
End: 2022-12-21
Payer: COMMERCIAL

## 2022-12-21 DIAGNOSIS — G89.29 CHRONIC RIGHT-SIDED LOW BACK PAIN WITHOUT SCIATICA: ICD-10-CM

## 2022-12-21 DIAGNOSIS — M54.50 CHRONIC RIGHT-SIDED LOW BACK PAIN WITHOUT SCIATICA: ICD-10-CM

## 2022-12-21 DIAGNOSIS — M79.18 RIGHT BUTTOCK PAIN: ICD-10-CM

## 2023-01-12 ENCOUNTER — OFFICE VISIT (OUTPATIENT)
Dept: PHYSICAL MEDICINE AND REHAB | Facility: MEDICAL CENTER | Age: 48
End: 2023-01-12
Payer: COMMERCIAL

## 2023-01-12 VITALS
SYSTOLIC BLOOD PRESSURE: 140 MMHG | HEIGHT: 70 IN | TEMPERATURE: 97.5 F | DIASTOLIC BLOOD PRESSURE: 86 MMHG | OXYGEN SATURATION: 96 % | BODY MASS INDEX: 31.47 KG/M2 | HEART RATE: 70 BPM | WEIGHT: 219.8 LBS

## 2023-01-12 DIAGNOSIS — M54.41 CHRONIC BILATERAL LOW BACK PAIN WITH RIGHT-SIDED SCIATICA: Primary | ICD-10-CM

## 2023-01-12 DIAGNOSIS — M79.18 GLUTEAL PAIN: ICD-10-CM

## 2023-01-12 DIAGNOSIS — G89.29 CHRONIC BILATERAL LOW BACK PAIN WITH RIGHT-SIDED SCIATICA: Primary | ICD-10-CM

## 2023-01-12 PROCEDURE — 99204 OFFICE O/P NEW MOD 45 MIN: CPT | Performed by: STUDENT IN AN ORGANIZED HEALTH CARE EDUCATION/TRAINING PROGRAM

## 2023-01-12 RX ORDER — CYCLOBENZAPRINE HCL 10 MG
10 TABLET ORAL 3 TIMES DAILY PRN
Qty: 90 TABLET | Refills: 2 | Status: SHIPPED | OUTPATIENT
Start: 2023-01-12 | End: 2023-05-16

## 2023-01-12 RX ORDER — GABAPENTIN 300 MG/1
300 CAPSULE ORAL
Qty: 30 CAPSULE | Refills: 2 | Status: SHIPPED | OUTPATIENT
Start: 2023-01-12 | End: 2023-03-14 | Stop reason: SDUPTHER

## 2023-01-12 ASSESSMENT — PATIENT HEALTH QUESTIONNAIRE - PHQ9: CLINICAL INTERPRETATION OF PHQ2 SCORE: 0

## 2023-01-12 ASSESSMENT — FIBROSIS 4 INDEX: FIB4 SCORE: 0.86

## 2023-01-12 ASSESSMENT — PAIN SCALES - GENERAL: PAINLEVEL: 2=MINIMAL-SLIGHT

## 2023-01-12 NOTE — PROGRESS NOTES
New Patient Note    Interventional Pain and Spine  Physiatry (Physical Medicine and Rehabilitation)     Patient Name: Danilo Sierra  : 1975  Date of Service: 2023  PCP: Kala Monzon M.D.  Referring Provider: Kala Monzon M.D.    Chief Complaint:   Chief Complaint   Patient presents with    Establish Care     Back pain- lower and upper right side        HPI  HISTORY (2023):  Danilo Sierra is a 47 y.o. male who presents today with R gluteal pain and R low back pain. Pain originally started as R gluteal pain 10 months ago while deadlifting. Over time the pain has persisted and has fluctuated. His back pain has somewhat improved. Typically his gluteal pain bothers him more than the back pain.  Pain does not radiate past his right glute.    Pain right now is 4/10 on the numeric pain scale. His pain at its best-worse level during the course of the day is typically 4-7/10, respectively.  Pain worsens with bending forward, laying down, and coughing and improves with sitting. The patient endorses tingling in his right foot, especially noticeable at his big toe. Denies focal weakness.    The patient has done physical therapy for this problem with no relief, most recently in Aug 2022.    Patient has tried the following medications with varied success (current meds in bold):   Oral steroids - significant relief  Flexeril - helped for back, not for the glute. No unwanted SE.   Tylenol  Ibuprofen 800mg - unsure relief    Therapeutic modalities and interventional therapies to date include:  -no injections  Chiro - no relief  Massage - no relief    Medical history includes GERD, prediabetes, Hashimotos.    Psychological testing for pain as depression and pain commonly coexist and need to both be treated.     Opioid Risk Score: 3      Interpretation of Opioid Risk Score   Score 0-3 = Low risk of abuse. Do UDS at least once per year.  Score 4-7 = Moderate risk of abuse. Do UDS 1-4 times per  year.  Score 8+ = High risk of abuse. Refer to specialist.    PHQ      7/6/2021 5/11/2022 1/12/2023   Depression Screen (PHQ-2/PHQ-9)   PHQ-2 Total Score 1 0 0   PHQ-9 Total Score 7         Multiple values from one day are sorted in reverse-chronological order       Interpretation of PHQ-9 Total Score   Score Severity   1-4 No Depression   5-9 Mild Depression   10-14 Moderate Depression   15-19 Moderately Severe Depression   20-27 Severe Depression      Medical records review:  I reviewed the note from the referring provider Kala Monzon M.D. including the note dated 12/21/2022.    ROS:   Red Flags ROS:   Fever, Chills, Sweats: Denies  Involuntary Weight Loss: Denies  Bladder Incontinence: Denies  Bowel Incontinence: denies  Saddle Anesthesia: Denies    All other systems reviewed and negative.     PMHx:   Past Medical History:   Diagnosis Date    Allergic rhinitis 1/9/2015    GERD (gastroesophageal reflux disease)     Hyperlipidemia     borderline, no meds    Insomnia 1/13/2012    Mild depression 7/6/2021    Subclinical hypothyroidism 5/11/2022       PSHx:   Past Surgical History:   Procedure Laterality Date    DENTAL EXTRACTION(S)      Celina Teeth       Family Hx:   Family History   Problem Relation Age of Onset    Hyperlipidemia Mother     Kidney Disease Mother     Cancer Maternal Grandmother         lung CA/smoker    Cancer Maternal Grandfather         colon    Diabetes Maternal Uncle        Social Hx:  Social History     Socioeconomic History    Marital status: Single     Spouse name: Not on file    Number of children: Not on file    Years of education: Not on file    Highest education level: Some college, no degree   Occupational History    Occupation: [employer]     Employer: [employer]   Tobacco Use    Smoking status: Never    Smokeless tobacco: Never   Vaping Use    Vaping Use: Never used   Substance and Sexual Activity    Alcohol use: Yes     Alcohol/week: 1.2 oz     Types: 2 Cans of beer per week      Comment: 1-2 a week    Drug use: No    Sexual activity: Yes     Partners: Female     Comment: Living with girlfriend   Other Topics Concern    Not on file   Social History Narrative    Not on file     Social Determinants of Health     Financial Resource Strain: Low Risk     Difficulty of Paying Living Expenses: Not very hard   Food Insecurity: No Food Insecurity    Worried About Running Out of Food in the Last Year: Never true    Ran Out of Food in the Last Year: Never true   Transportation Needs: No Transportation Needs    Lack of Transportation (Medical): No    Lack of Transportation (Non-Medical): No   Physical Activity: Sufficiently Active    Days of Exercise per Week: 4 days    Minutes of Exercise per Session: 60 min   Stress: Stress Concern Present    Feeling of Stress : Rather much   Social Connections: Moderately Isolated    Frequency of Communication with Friends and Family: Once a week    Frequency of Social Gatherings with Friends and Family: More than three times a week    Attends Islam Services: Never    Active Member of Clubs or Organizations: No    Attends Club or Organization Meetings: Never    Marital Status: Living with partner   Intimate Partner Violence: Not on file   Housing Stability: Low Risk     Unable to Pay for Housing in the Last Year: No    Number of Places Lived in the Last Year: 1    Unstable Housing in the Last Year: No       Allergies:  No Known Allergies    Medications: reviewed on epic.   Outpatient Medications Marked as Taking for the 1/12/23 encounter (Office Visit) with Latoya Haley M.D.   Medication Sig Dispense Refill    cyclobenzaprine (FLEXERIL) 10 mg Tab Take 1 Tablet by mouth 3 times a day as needed for Muscle Spasms or Moderate Pain. 90 Tablet 2    gabapentin (NEURONTIN) 300 MG Cap Take 1 Capsule by mouth at bedtime. 30 Capsule 2    traZODone (DESYREL) 50 MG Tab TAKE 1 TO 2 TABLETS BY MOUTH AT BEDTIME 90 Tablet 3    testosterone cypionate (DEPO-TESTOSTERONE) 200  "MG/ML Solution injection testosterone cypionate 200 mg/mL intramuscular oil   INJECT 1 ML EVERY 2 WEEKS BY INTRAMUSCULAR ROUTE      omeprazole (PRILOSEC) 40 MG delayed-release capsule TAKE 1 CAPSULE BY MOUTH 30 MINS BEFORE FOOD 90 Capsule 3    atorvastatin (LIPITOR) 20 MG Tab Take 1 Tablet by mouth every day. 90 Tablet 2    levothyroxine (SYNTHROID) 25 MCG Tab Take 1 Tablet by mouth every morning on an empty stomach. 90 Tablet 1    Loratadine (CLARITIN PO) Take  by mouth.          Current Outpatient Medications on File Prior to Visit   Medication Sig Dispense Refill    traZODone (DESYREL) 50 MG Tab TAKE 1 TO 2 TABLETS BY MOUTH AT BEDTIME 90 Tablet 3    testosterone cypionate (DEPO-TESTOSTERONE) 200 MG/ML Solution injection testosterone cypionate 200 mg/mL intramuscular oil   INJECT 1 ML EVERY 2 WEEKS BY INTRAMUSCULAR ROUTE      omeprazole (PRILOSEC) 40 MG delayed-release capsule TAKE 1 CAPSULE BY MOUTH 30 MINS BEFORE FOOD 90 Capsule 3    atorvastatin (LIPITOR) 20 MG Tab Take 1 Tablet by mouth every day. 90 Tablet 2    levothyroxine (SYNTHROID) 25 MCG Tab Take 1 Tablet by mouth every morning on an empty stomach. 90 Tablet 1    Loratadine (CLARITIN PO) Take  by mouth.      TRAZODONE & DIET MANAGE PROD PO trazodone (Patient not taking: Reported on 12/14/2022)      testosterone cypionate (DEPO-TESTOSTERONE) 200 MG/ML Solution injection INJECT 1 ML EVERY 2 WEEKS BY INTRAMUSCULAR ROUTE PA DENIED (Patient not taking: Reported on 12/14/2022)       No current facility-administered medications on file prior to visit.         EXAMINATION     Physical Exam:   BP (!) 140/86 (BP Location: Left arm, Patient Position: Sitting, BP Cuff Size: Adult)   Pulse 70   Temp 36.4 °C (97.5 °F) (Temporal)   Ht 1.778 m (5' 10\")   Wt 99.7 kg (219 lb 12.8 oz)   SpO2 96%     Constitutional:   Body Habitus: Body mass index is 31.54 kg/m².  Cooperation: Fully cooperates with exam  Appearance: Well-groomed, well-nourished.    Eyes: No scleral " icterus to suggest severe liver disease, no proptosis to suggest severe hyperthyroidism    ENT -no obvious auditory deficits, no noticeable facial droop     Skin -no rashes or lesions noted     Respiratory-  breathing comfortably on room air, no audible wheezing    Cardiovascular-distal extremities warm and well perfused.  No lower extremity edema is noted.     Gastrointestinal - no obvious abdominal masses, non-distended    Psychiatric- alert and oriented ×3. Normal affect.     Gait - normal gait, no use of ambulatory device, nonantalgic.     Musculoskeletal and Neuro -     Thoracic/Lumbar Spine/Sacral Spine/Hips   Inspection: No evidence of atrophy in bilateral lower extremities throughout   There is full active range of motion with lumbar extension    Facet loading maneuver negative bilaterally    Palpation:   Tenderness to palpation over the  right posterior lateral glute and right lumbar paraspinal muscles . No tenderness to palpation elsewhere in the low back/hips including midline of lumbosacral spine, paraspinal muscles on left, lumbar facets bilaterally spanning approximately L1-L5 levels, sacroiliac joints bilaterally, PSIS bilaterally, and greater trochanters bilaterally.    Lumbar spine /hip provocative exam maneuvers  Straight leg raise positive on right, negative on left  Slump-sit test positive on right, negative on left  FADIR test negative bilaterally    SI joint tests  NICOLAS test negative bilaterally  Thigh thrust test negative bilaterally      Key points for the international standards for neurological classification of spinal cord injury (ISNCSCI) to light touch.   Dermatome R L   L2 2 2   L3 2 2   L4 2 2   L5 1 2   S1 1 2   S2 2 2       Motor Exam Lower Extremities  ? Myotome R L   Hip flexion L2 5 5   Knee extension L3 5 5   Ankle dorsiflexion L4 5 5   Toe extension L5 5 5   Ankle plantarflexion S1 5 5       Reflexes  ?  R L   Patella  2+ 2+   Achilles   2+ 2+     Clonus of the ankle negative  bilaterally       MEDICAL DECISION MAKING    Medical records review: see under HPI section.     DATA    Labs: Personally reviewed at today's visit:     Lab Results   Component Value Date/Time    SODIUM 141 06/20/2022 06:59 AM    SODIUM 137 12/14/2020 09:05 AM    POTASSIUM 4.9 06/20/2022 06:59 AM    POTASSIUM 4.3 12/14/2020 09:05 AM    CHLORIDE 104 06/20/2022 06:59 AM    CHLORIDE 104 12/14/2020 09:05 AM    CO2 23 06/20/2022 06:59 AM    CO2 26 12/14/2020 09:05 AM    ANION 7.0 12/14/2020 09:05 AM    GLUCOSE 99 06/20/2022 06:59 AM    GLUCOSE 104 (H) 12/14/2020 09:05 AM    BUN 18 06/20/2022 06:59 AM    BUN 20 12/14/2020 09:05 AM    CREATININE 1.23 06/20/2022 06:59 AM    CREATININE 1.03 12/14/2020 09:05 AM    CALCIUM 9.7 06/20/2022 06:59 AM    CALCIUM 9.6 12/14/2020 09:05 AM    ASTSGOT 25 06/20/2022 06:59 AM    ASTSGOT 28 12/14/2020 09:07 AM    ALTSGPT 35 06/20/2022 06:59 AM    ALTSGPT 54 (H) 12/14/2020 09:07 AM    TBILIRUBIN 0.6 06/20/2022 06:59 AM    TBILIRUBIN 0.7 12/14/2020 09:07 AM    ALBUMIN 4.5 06/20/2022 06:59 AM    ALBUMIN 4.5 12/14/2020 09:07 AM    TOTPROTEIN 7.3 06/20/2022 06:59 AM    TOTPROTEIN 7.6 12/14/2020 09:07 AM    GLOBULIN 2.8 06/20/2022 06:59 AM    GLOBULIN 3.0 12/14/2020 09:05 AM    AGRATIO 1.6 06/20/2022 06:59 AM    AGRATIO 1.5 12/14/2020 09:05 AM       No results found for: PROTHROMBTM, INR     Lab Results   Component Value Date/Time    WBC 7.7 09/02/2022 04:15 PM    RBC 5.11 09/02/2022 04:15 PM    HEMOGLOBIN 16.2 09/02/2022 04:15 PM    HEMATOCRIT 46.6 09/02/2022 04:15 PM    MCV 91.2 09/02/2022 04:15 PM    MCH 31.7 09/02/2022 04:15 PM    MCHC 34.8 09/02/2022 04:15 PM    MPV 10.5 09/02/2022 04:15 PM    NEUTSPOLYS 45.30 09/02/2022 04:15 PM    LYMPHOCYTES 41.40 (H) 09/02/2022 04:15 PM    MONOCYTES 7.50 09/02/2022 04:15 PM    EOSINOPHILS 4.80 09/02/2022 04:15 PM    BASOPHILS 0.90 09/02/2022 04:15 PM        Lab Results   Component Value Date/Time    HBA1C 5.8 (H) 06/20/2022 06:59 AM    HBA1C 5.6  2020 09:05 AM        Imaging:   I personally reviewed following images, these are my reads  No pertinent imaging available for review at the time of today's visit                                                    Diagnosis  Visit Diagnoses     ICD-10-CM   1. Chronic bilateral low back pain with right-sided sciatica  M54.41    G89.29   2. Gluteal pain  M79.18         ASSESSMENT AND PLAN:  Danilo Sierra ( 1975) is a male with right-sided low back and right gluteal pain that radiates down his posterior right leg, generally not past the glute, with numbness and tingling in his right foot.     Danilo was seen today for establish care.    Diagnoses and all orders for this visit:    Chronic bilateral low back pain with right-sided sciatica  -     MR-LUMBAR SPINE-W/O; Future    Gluteal pain  -     MR-LUMBAR SPINE-W/O; Future    Other orders  -     cyclobenzaprine (FLEXERIL) 10 mg Tab; Take 1 Tablet by mouth 3 times a day as needed for Muscle Spasms or Moderate Pain.  -     gabapentin (NEURONTIN) 300 MG Cap; Take 1 Capsule by mouth at bedtime.          PLAN  Physical Therapy: Patient has recently completed physical therapy for this issue with no improvement in pain.  Continue home exercise program as able    Diagnostic workup: as above.  Assess for possible right L5 versus S1 nerve impingement    Medications:   - given the neuropathic component of pain, I will start gabapentin as above. Counseled on possible side effect of drowsiness and dizziness and discussed that the patient should discontinue this if the side effects are too severe.  Counseled patient to initially try taking this medication at bedtime.  -Given the myofascial component of pain with previous improvement with Flexeril and no unwanted side effects, refill Flexeril  -  reviewed, records do not demonstrate increased risk of opioid abuse.    Interventions:   -Pending MRI results    Follow-up: After MRI complete    Orders Placed  This Encounter    MR-LUMBAR SPINE-W/O    cyclobenzaprine (FLEXERIL) 10 mg Tab    gabapentin (NEURONTIN) 300 MG Cap       Latoya Haley MD  Interventional Pain and Spine  Physical Medicine and Rehabilitation  [facility]    Kala Low M.D.     The above note documents my personal evaluation of this patient. In addition, I have reviewed and confirmed with the patient and MA the supportive information documented in today's Patient Health Questionnaire and Office Note.     Please note that this dictation was created using voice recognition software. I have made every reasonable attempt to correct obvious errors, but I expect that there are errors of grammar and possibly content that I did not discover before finalizing the note.

## 2023-01-16 ENCOUNTER — APPOINTMENT (OUTPATIENT)
Dept: PHYSICAL MEDICINE AND REHAB | Facility: MEDICAL CENTER | Age: 48
End: 2023-01-16
Payer: COMMERCIAL

## 2023-01-16 ENCOUNTER — HOSPITAL ENCOUNTER (OUTPATIENT)
Dept: RADIOLOGY | Facility: MEDICAL CENTER | Age: 48
End: 2023-01-16
Attending: STUDENT IN AN ORGANIZED HEALTH CARE EDUCATION/TRAINING PROGRAM
Payer: COMMERCIAL

## 2023-01-16 DIAGNOSIS — M54.41 CHRONIC BILATERAL LOW BACK PAIN WITH RIGHT-SIDED SCIATICA: ICD-10-CM

## 2023-01-16 DIAGNOSIS — M79.18 GLUTEAL PAIN: ICD-10-CM

## 2023-01-16 DIAGNOSIS — G89.29 CHRONIC BILATERAL LOW BACK PAIN WITH RIGHT-SIDED SCIATICA: ICD-10-CM

## 2023-01-16 PROCEDURE — 72148 MRI LUMBAR SPINE W/O DYE: CPT

## 2023-01-24 ENCOUNTER — OFFICE VISIT (OUTPATIENT)
Dept: PHYSICAL MEDICINE AND REHAB | Facility: MEDICAL CENTER | Age: 48
End: 2023-01-24
Payer: COMMERCIAL

## 2023-01-24 VITALS
OXYGEN SATURATION: 98 % | BODY MASS INDEX: 31.59 KG/M2 | WEIGHT: 220.68 LBS | DIASTOLIC BLOOD PRESSURE: 84 MMHG | SYSTOLIC BLOOD PRESSURE: 132 MMHG | TEMPERATURE: 97.9 F | HEIGHT: 70 IN | HEART RATE: 82 BPM

## 2023-01-24 DIAGNOSIS — M79.18 GLUTEAL PAIN: ICD-10-CM

## 2023-01-24 DIAGNOSIS — G89.29 CHRONIC BILATERAL LOW BACK PAIN WITH RIGHT-SIDED SCIATICA: ICD-10-CM

## 2023-01-24 DIAGNOSIS — M54.16 RIGHT LUMBAR RADICULOPATHY: Primary | ICD-10-CM

## 2023-01-24 DIAGNOSIS — M54.41 CHRONIC BILATERAL LOW BACK PAIN WITH RIGHT-SIDED SCIATICA: ICD-10-CM

## 2023-01-24 PROCEDURE — 99214 OFFICE O/P EST MOD 30 MIN: CPT | Performed by: STUDENT IN AN ORGANIZED HEALTH CARE EDUCATION/TRAINING PROGRAM

## 2023-01-24 ASSESSMENT — PATIENT HEALTH QUESTIONNAIRE - PHQ9: CLINICAL INTERPRETATION OF PHQ2 SCORE: 0

## 2023-01-24 ASSESSMENT — PAIN SCALES - GENERAL: PAINLEVEL: 3=SLIGHT PAIN

## 2023-01-24 ASSESSMENT — FIBROSIS 4 INDEX: FIB4 SCORE: 0.86

## 2023-01-24 NOTE — PROGRESS NOTES
Follow-up patient Note    Interventional Pain and Spine  Physiatry (Physical Medicine and Rehabilitation)     Patient Name: Danilo Sierra  : 1975  Date of service: 2023    Chief Complaint:   Chief Complaint   Patient presents with    Follow-Up       HISTORY (2023):  Danilo Sierra is a 47 y.o. male who presents today with R gluteal pain and R low back pain. Pain originally started as R gluteal pain 10 months ago while deadlifting. Over time the pain has persisted and has fluctuated. His back pain has somewhat improved. Typically his gluteal pain bothers him more than the back pain.  Pain does not radiate past his right glute.     Pain right now is 4/10 on the numeric pain scale. His pain at its best-worse level during the course of the day is typically 4-7/10, respectively.  Pain worsens with bending forward, laying down, and coughing and improves with sitting. The patient endorses tingling in his right foot, especially noticeable at his big toe. Denies focal weakness.     The patient has done physical therapy for this problem with no relief, most recently in Aug 2022.     Patient has tried the following medications with varied success (current meds in bold):   Oral steroids - significant relief  Flexeril - helped for back, not for the glute. No unwanted SE.   Tylenol  Ibuprofen 800mg - unsure relief     Therapeutic modalities and interventional therapies to date include:  -no injections  Chiro - no relief  Massage - no relief     Medical history includes GERD, prediabetes, Hashimotos.    HPI  Today's visit   Danilo Sierra ( 1975) is a male with The primary encounter diagnosis was Right L5 lumbar radiculopathy. Diagnoses of Gluteal pain and Chronic bilateral low back pain with right-sided sciatica were also pertinent to this visit.    Presents today for MRI review.  Notes ongoing pain in his right glute and tingling in his right foot especially at his big  toe.  Denies focal weakness.  The pain seems somewhat improved compared to his last visit.  He is taking gabapentin 300mg QHS and flexeril 10mg QHS with no unwanted side effects and possibly some improvement.  Pain continues to interfere with his ability to exercise and workout.    He is amenable to a physical therapy referral.  He would generally like to avoid injections at this time.    Pain severity 4/10 currently  Pt denies new numbness, tingling, or weakness.      ROS:   Red Flags ROS:   Fever, Chills, Sweats: Denies  Involuntary Weight Loss: Denies  Bladder Incontinence: Denies  Bowel Incontinence: denies  Saddle Anesthesia: Denies    All other systems reviewed and negative.     PMHx:   Past Medical History:   Diagnosis Date    Allergic rhinitis 1/9/2015    GERD (gastroesophageal reflux disease)     Hyperlipidemia     borderline, no meds    Insomnia 1/13/2012    Mild depression 7/6/2021    Subclinical hypothyroidism 5/11/2022       PSHx:   Past Surgical History:   Procedure Laterality Date    DENTAL EXTRACTION(S)      Florence Teeth       Family Hx:   Family History   Problem Relation Age of Onset    Hyperlipidemia Mother     Kidney Disease Mother     Cancer Maternal Grandmother         lung CA/smoker    Cancer Maternal Grandfather         colon    Diabetes Maternal Uncle        Social Hx:  Social History     Socioeconomic History    Marital status: Single     Spouse name: Not on file    Number of children: Not on file    Years of education: Not on file    Highest education level: Some college, no degree   Occupational History    Occupation:      Employer: [employer]   Tobacco Use    Smoking status: Never    Smokeless tobacco: Never   Vaping Use    Vaping Use: Never used   Substance and Sexual Activity    Alcohol use: Yes     Alcohol/week: 1.2 oz     Types: 2 Cans of beer per week     Comment: 1-2 a week    Drug use: No    Sexual activity: Yes     Partners: Female     Comment: Living with girlfriend    Other Topics Concern    Not on file   Social History Narrative    Not on file     Social Determinants of Health     Financial Resource Strain: Low Risk     Difficulty of Paying Living Expenses: Not very hard   Food Insecurity: No Food Insecurity    Worried About Running Out of Food in the Last Year: Never true    Ran Out of Food in the Last Year: Never true   Transportation Needs: No Transportation Needs    Lack of Transportation (Medical): No    Lack of Transportation (Non-Medical): No   Physical Activity: Sufficiently Active    Days of Exercise per Week: 4 days    Minutes of Exercise per Session: 60 min   Stress: Stress Concern Present    Feeling of Stress : Rather much   Social Connections: Moderately Isolated    Frequency of Communication with Friends and Family: Once a week    Frequency of Social Gatherings with Friends and Family: More than three times a week    Attends Restorationist Services: Never    Active Member of Clubs or Organizations: No    Attends Club or Organization Meetings: Never    Marital Status: Living with partner   Intimate Partner Violence: Not on file   Housing Stability: Low Risk     Unable to Pay for Housing in the Last Year: No    Number of Places Lived in the Last Year: 1    Unstable Housing in the Last Year: No       Allergies:  No Known Allergies    Medications: reviewed on epic.   Outpatient Medications Marked as Taking for the 1/24/23 encounter (Office Visit) with Latoya Haley M.D.   Medication Sig Dispense Refill    cyclobenzaprine (FLEXERIL) 10 mg Tab Take 1 Tablet by mouth 3 times a day as needed for Muscle Spasms or Moderate Pain. 90 Tablet 2    gabapentin (NEURONTIN) 300 MG Cap Take 1 Capsule by mouth at bedtime. 30 Capsule 2    traZODone (DESYREL) 50 MG Tab TAKE 1 TO 2 TABLETS BY MOUTH AT BEDTIME 90 Tablet 3    testosterone cypionate (DEPO-TESTOSTERONE) 200 MG/ML Solution injection testosterone cypionate 200 mg/mL intramuscular oil   INJECT 1 ML EVERY 2 WEEKS BY INTRAMUSCULAR  "ROUTE      omeprazole (PRILOSEC) 40 MG delayed-release capsule TAKE 1 CAPSULE BY MOUTH 30 MINS BEFORE FOOD 90 Capsule 3    atorvastatin (LIPITOR) 20 MG Tab Take 1 Tablet by mouth every day. 90 Tablet 2    levothyroxine (SYNTHROID) 25 MCG Tab Take 1 Tablet by mouth every morning on an empty stomach. 90 Tablet 1    Loratadine (CLARITIN PO) Take  by mouth.          Current Outpatient Medications on File Prior to Visit   Medication Sig Dispense Refill    cyclobenzaprine (FLEXERIL) 10 mg Tab Take 1 Tablet by mouth 3 times a day as needed for Muscle Spasms or Moderate Pain. 90 Tablet 2    gabapentin (NEURONTIN) 300 MG Cap Take 1 Capsule by mouth at bedtime. 30 Capsule 2    traZODone (DESYREL) 50 MG Tab TAKE 1 TO 2 TABLETS BY MOUTH AT BEDTIME 90 Tablet 3    testosterone cypionate (DEPO-TESTOSTERONE) 200 MG/ML Solution injection testosterone cypionate 200 mg/mL intramuscular oil   INJECT 1 ML EVERY 2 WEEKS BY INTRAMUSCULAR ROUTE      omeprazole (PRILOSEC) 40 MG delayed-release capsule TAKE 1 CAPSULE BY MOUTH 30 MINS BEFORE FOOD 90 Capsule 3    atorvastatin (LIPITOR) 20 MG Tab Take 1 Tablet by mouth every day. 90 Tablet 2    levothyroxine (SYNTHROID) 25 MCG Tab Take 1 Tablet by mouth every morning on an empty stomach. 90 Tablet 1    Loratadine (CLARITIN PO) Take  by mouth.      TRAZODONE & DIET MANAGE PROD PO trazodone (Patient not taking: Reported on 12/14/2022)      testosterone cypionate (DEPO-TESTOSTERONE) 200 MG/ML Solution injection INJECT 1 ML EVERY 2 WEEKS BY INTRAMUSCULAR ROUTE PA DENIED (Patient not taking: Reported on 12/14/2022)       No current facility-administered medications on file prior to visit.         EXAMINATION     Physical Exam:   /84 (BP Location: Right arm, Patient Position: Sitting, BP Cuff Size: Adult)   Pulse 82   Temp 36.6 °C (97.9 °F) (Temporal)   Ht 1.778 m (5' 10\")   Wt 100 kg (220 lb 10.9 oz)   SpO2 98%     Constitutional:   Body Habitus: Body mass index is 31.66 " kg/m².  Cooperation: Fully cooperates with exam  Appearance: Well-groomed, well-nourished.    Eyes: No scleral icterus to suggest severe liver disease, no proptosis to suggest severe hyperthyroidism    ENT -no obvious auditory deficits, no noticeable facial droop     Skin -no rashes or lesions noted     Respiratory-  breathing comfortably on room air, no audible wheezing    Cardiovascular-distal extremities warm and well perfused.  No lower extremity edema is noted.     Gastrointestinal - no obvious abdominal masses, non-distended    Psychiatric- alert and oriented ×3. Normal affect.     Gait - normal gait, no use of ambulatory device, nonantalgic.      Musculoskeletal and Neuro -      Thoracic/Lumbar Spine/Sacral Spine/Hips   Inspection: No evidence of atrophy in bilateral lower extremities throughout   There is full active range of motion with lumbar extension     Facet loading maneuver negative bilaterally     Palpation:   Tenderness to palpation over the  right posterior lateral glute and right lumbar paraspinal muscles . No tenderness to palpation elsewhere in the low back/hips including midline of lumbosacral spine, paraspinal muscles on left, lumbar facets bilaterally spanning approximately L1-L5 levels, sacroiliac joints bilaterally, PSIS bilaterally, and greater trochanters bilaterally.     Lumbar spine /hip provocative exam maneuvers  Straight leg raise positive on right for right gluteal pain, negative on left  FADIR test negative bilaterally     SI joint tests  NICOLAS test negative bilaterally  Thigh thrust test negative bilaterally        Key points for the international standards for neurological classification of spinal cord injury (ISNCSCI) to light touch.   Dermatome R L   L2 2 2   L3 2 2   L4 2 2   L5 1 2   S1 2 2   S2 2 2         Motor Exam Lower Extremities  ? Myotome R L   Hip flexion L2 5 5   Knee extension L3 5 5   Ankle dorsiflexion L4 5 5   Toe extension L5 5 5   Ankle plantarflexion S1 5 5         Previous exam  Reflexes  ?   R L   Patella   2+ 2+   Achilles    2+ 2+      Clonus of the ankle negative bilaterally     MEDICAL DECISION MAKING    Medical records review: see under HPI section.     DATA    Labs: No new labs available for review since last visit.   Lab Results   Component Value Date/Time    SODIUM 141 06/20/2022 06:59 AM    SODIUM 137 12/14/2020 09:05 AM    POTASSIUM 4.9 06/20/2022 06:59 AM    POTASSIUM 4.3 12/14/2020 09:05 AM    CHLORIDE 104 06/20/2022 06:59 AM    CHLORIDE 104 12/14/2020 09:05 AM    CO2 23 06/20/2022 06:59 AM    CO2 26 12/14/2020 09:05 AM    ANION 7.0 12/14/2020 09:05 AM    GLUCOSE 99 06/20/2022 06:59 AM    GLUCOSE 104 (H) 12/14/2020 09:05 AM    BUN 18 06/20/2022 06:59 AM    BUN 20 12/14/2020 09:05 AM    CREATININE 1.23 06/20/2022 06:59 AM    CREATININE 1.03 12/14/2020 09:05 AM    CALCIUM 9.7 06/20/2022 06:59 AM    CALCIUM 9.6 12/14/2020 09:05 AM    ASTSGOT 25 06/20/2022 06:59 AM    ASTSGOT 28 12/14/2020 09:07 AM    ALTSGPT 35 06/20/2022 06:59 AM    ALTSGPT 54 (H) 12/14/2020 09:07 AM    TBILIRUBIN 0.6 06/20/2022 06:59 AM    TBILIRUBIN 0.7 12/14/2020 09:07 AM    ALBUMIN 4.5 06/20/2022 06:59 AM    ALBUMIN 4.5 12/14/2020 09:07 AM    TOTPROTEIN 7.3 06/20/2022 06:59 AM    TOTPROTEIN 7.6 12/14/2020 09:07 AM    GLOBULIN 2.8 06/20/2022 06:59 AM    GLOBULIN 3.0 12/14/2020 09:05 AM    AGRATIO 1.6 06/20/2022 06:59 AM    AGRATIO 1.5 12/14/2020 09:05 AM       No results found for: PROTHROMBTM, INR     Lab Results   Component Value Date/Time    WBC 7.7 09/02/2022 04:15 PM    RBC 5.11 09/02/2022 04:15 PM    HEMOGLOBIN 16.2 09/02/2022 04:15 PM    HEMATOCRIT 46.6 09/02/2022 04:15 PM    MCV 91.2 09/02/2022 04:15 PM    MCH 31.7 09/02/2022 04:15 PM    MCHC 34.8 09/02/2022 04:15 PM    MPV 10.5 09/02/2022 04:15 PM    NEUTSPOLYS 45.30 09/02/2022 04:15 PM    LYMPHOCYTES 41.40 (H) 09/02/2022 04:15 PM    MONOCYTES 7.50 09/02/2022 04:15 PM    EOSINOPHILS 4.80 09/02/2022 04:15 PM    BASOPHILS 0.90  2022 04:15 PM        Lab Results   Component Value Date/Time    HBA1C 5.8 (H) 2022 06:59 AM    HBA1C 5.6 2020 09:05 AM        Imaging:   I personally reviewed following images, these are my reads  MRI lumbar spine 23  Very mild disc bulge at L2-3, L3-4, and L4-5.  Disc bulge at L4-5 has a small right paracentral disc protrusion likely impinging upon the descending right L5 nerve.  Mild central canal stenosis and right neuroforaminal stenosis at this level.  Bilateral facet arthropathy appearing most notable at L2-3, L3-4, and L4-5. See formal radiology report for further details.        IMAGING radiology reads. I reviewed the following radiology reads           Results for orders placed during the hospital encounter of 23    MR-LUMBAR SPINE-W/O    Impression  1.  L4-L5 right paracentral disc protrusion which likely impinges on the right L5 nerve in the lateral recess in keeping with history. Mild spinal stenosis and right foraminal narrowing.  2.  Mild lumbar spondylosis elsewhere as detailed.                                                                   Diagnosis  Visit Diagnoses     ICD-10-CM   1. Right L5 lumbar radiculopathy  M54.16   2. Gluteal pain  M79.18   3. Chronic bilateral low back pain with right-sided sciatica  M54.41    G89.29         ASSESSMENT AND PLAN:  Danilo Sierra (: 1975) is a male with  right-sided low back and right gluteal pain that radiates down his posterior right leg, generally not past the glute, with numbness and tingling in his right foot.    Assess for possible right L5 versus S1 nerve impingement        Danilo was seen today for follow-up.    Diagnoses and all orders for this visit:    Right L5 lumbar radiculopathy  -     Referral to Physical Therapy    Gluteal pain  -     Referral to Physical Therapy    Chronic bilateral low back pain with right-sided sciatica  -     Referral to Physical Therapy          PLAN  Physical  Therapy: Referral to physical therapy today.  When he previously did physical therapy, his pain was worse back then and he found multiple exercises challenging.  Discussed that I anticipate he would be able to make more progress with an updated physical therapy referral now that his pain has improved.      Diagnostic workup: Personally reviewed at today's visit:  MRI lumbar spine 1/16/23    Medications:   - continue gabapentin and flexeril     Interventions:   -Discussed possible right L5-S1 transforaminal epidural steroid injection.  He would like to defer this at this time in favor of pursuing physical therapy first which I think is reasonable. The risks, benefits, and alternatives to this procedure were discussed. Risks include but are not limited to damage to surrounding structures, infection, bleeding, worsening of pain which can be permanent, and weakness which can be permanent. Benefits include pain relief and improved function. Alternatives include not doing the procedure.      Follow-up: In 2 months to assess progress her PT    Orders Placed This Encounter    Referral to Physical Therapy       Latoya Haley MD  Interventional Pain and Spine  Physical Medicine and Rehabilitation  [facility]      The above note documents my personal evaluation of this patient. In addition, I have reviewed and confirmed with the patient and MA the supportive information documented in today's Patient Health Questionnaire and Office Note.     Please note that this dictation was created using voice recognition software. I have made every reasonable attempt to correct obvious errors, but I expect that there are errors of grammar and possibly content that I did not discover before finalizing the note.

## 2023-02-01 ENCOUNTER — HOSPITAL ENCOUNTER (OUTPATIENT)
Dept: LAB | Facility: MEDICAL CENTER | Age: 48
End: 2023-02-01
Attending: INTERNAL MEDICINE
Payer: COMMERCIAL

## 2023-02-01 DIAGNOSIS — R73.03 PREDIABETES: ICD-10-CM

## 2023-02-01 DIAGNOSIS — R79.89 LOW TESTOSTERONE IN MALE: ICD-10-CM

## 2023-02-01 DIAGNOSIS — E78.00 PURE HYPERCHOLESTEROLEMIA: ICD-10-CM

## 2023-02-01 LAB
ALBUMIN SERPL BCP-MCNC: 4.4 G/DL (ref 3.2–4.9)
ALBUMIN/GLOB SERPL: 1.5 G/DL
ALP SERPL-CCNC: 88 U/L (ref 30–99)
ALT SERPL-CCNC: 53 U/L (ref 2–50)
ANION GAP SERPL CALC-SCNC: 8 MMOL/L (ref 7–16)
AST SERPL-CCNC: 30 U/L (ref 12–45)
BASOPHILS # BLD AUTO: 0.6 % (ref 0–1.8)
BASOPHILS # BLD: 0.05 K/UL (ref 0–0.12)
BILIRUB SERPL-MCNC: 0.9 MG/DL (ref 0.1–1.5)
BUN SERPL-MCNC: 23 MG/DL (ref 8–22)
CALCIUM ALBUM COR SERPL-MCNC: 9.3 MG/DL (ref 8.5–10.5)
CALCIUM SERPL-MCNC: 9.6 MG/DL (ref 8.5–10.5)
CHLORIDE SERPL-SCNC: 106 MMOL/L (ref 96–112)
CHOLEST SERPL-MCNC: 182 MG/DL (ref 100–199)
CO2 SERPL-SCNC: 27 MMOL/L (ref 20–33)
CREAT SERPL-MCNC: 1.22 MG/DL (ref 0.5–1.4)
EOSINOPHIL # BLD AUTO: 0.34 K/UL (ref 0–0.51)
EOSINOPHIL NFR BLD: 4.1 % (ref 0–6.9)
ERYTHROCYTE [DISTWIDTH] IN BLOOD BY AUTOMATED COUNT: 42.5 FL (ref 35.9–50)
EST. AVERAGE GLUCOSE BLD GHB EST-MCNC: 114 MG/DL
FASTING STATUS PATIENT QL REPORTED: NORMAL
GFR SERPLBLD CREATININE-BSD FMLA CKD-EPI: 73 ML/MIN/1.73 M 2
GLOBULIN SER CALC-MCNC: 2.9 G/DL (ref 1.9–3.5)
GLUCOSE SERPL-MCNC: 86 MG/DL (ref 65–99)
HBA1C MFR BLD: 5.6 % (ref 4–5.6)
HCT VFR BLD AUTO: 47.9 % (ref 42–52)
HDLC SERPL-MCNC: 37 MG/DL
HGB BLD-MCNC: 16.1 G/DL (ref 14–18)
IMM GRANULOCYTES # BLD AUTO: 0.03 K/UL (ref 0–0.11)
IMM GRANULOCYTES NFR BLD AUTO: 0.4 % (ref 0–0.9)
LDLC SERPL CALC-MCNC: 119 MG/DL
LYMPHOCYTES # BLD AUTO: 1.74 K/UL (ref 1–4.8)
LYMPHOCYTES NFR BLD: 21.2 % (ref 22–41)
MCH RBC QN AUTO: 29.2 PG (ref 27–33)
MCHC RBC AUTO-ENTMCNC: 33.6 G/DL (ref 33.7–35.3)
MCV RBC AUTO: 86.9 FL (ref 81.4–97.8)
MONOCYTES # BLD AUTO: 0.66 K/UL (ref 0–0.85)
MONOCYTES NFR BLD AUTO: 8 % (ref 0–13.4)
NEUTROPHILS # BLD AUTO: 5.39 K/UL (ref 1.82–7.42)
NEUTROPHILS NFR BLD: 65.7 % (ref 44–72)
NRBC # BLD AUTO: 0 K/UL
NRBC BLD-RTO: 0 /100 WBC
PLATELET # BLD AUTO: 223 K/UL (ref 164–446)
PMV BLD AUTO: 10.6 FL (ref 9–12.9)
POTASSIUM SERPL-SCNC: 4.8 MMOL/L (ref 3.6–5.5)
PROT SERPL-MCNC: 7.3 G/DL (ref 6–8.2)
RBC # BLD AUTO: 5.51 M/UL (ref 4.7–6.1)
SODIUM SERPL-SCNC: 141 MMOL/L (ref 135–145)
TRIGL SERPL-MCNC: 130 MG/DL (ref 0–149)
WBC # BLD AUTO: 8.2 K/UL (ref 4.8–10.8)

## 2023-02-01 PROCEDURE — 85025 COMPLETE CBC W/AUTO DIFF WBC: CPT

## 2023-02-01 PROCEDURE — 36415 COLL VENOUS BLD VENIPUNCTURE: CPT

## 2023-02-01 PROCEDURE — 80053 COMPREHEN METABOLIC PANEL: CPT

## 2023-02-01 PROCEDURE — 80061 LIPID PANEL: CPT

## 2023-02-01 PROCEDURE — 83036 HEMOGLOBIN GLYCOSYLATED A1C: CPT

## 2023-02-02 DIAGNOSIS — R76.8 ANTI-TPO ANTIBODIES PRESENT: ICD-10-CM

## 2023-02-02 DIAGNOSIS — R73.03 PREDIABETES: ICD-10-CM

## 2023-02-02 DIAGNOSIS — E78.00 PURE HYPERCHOLESTEROLEMIA: ICD-10-CM

## 2023-02-02 RX ORDER — LEVOTHYROXINE SODIUM 0.03 MG/1
25 TABLET ORAL
Qty: 90 TABLET | Refills: 1 | Status: SHIPPED | OUTPATIENT
Start: 2023-02-02 | End: 2023-08-24 | Stop reason: SDUPTHER

## 2023-02-03 NOTE — TELEPHONE ENCOUNTER
Received request via: Pharmacy    Was the patient seen in the last year in this department? Yes  12/14/22  Does the patient have an active prescription (recently filled or refills available) for medication(s) requested? No    Does the patient have [facility]PC Plus and need 100 day supply (blood pressure, diabetes and cholesterol meds only)? Patient does not have SCP

## 2023-02-14 DIAGNOSIS — K21.00 GASTROESOPHAGEAL REFLUX DISEASE WITH ESOPHAGITIS WITHOUT HEMORRHAGE: ICD-10-CM

## 2023-02-15 RX ORDER — PANTOPRAZOLE SODIUM 40 MG/1
40 TABLET, DELAYED RELEASE ORAL DAILY
Qty: 90 TABLET | Refills: 1 | Status: SHIPPED | OUTPATIENT
Start: 2023-02-15 | End: 2023-06-09

## 2023-02-22 ENCOUNTER — HOSPITAL ENCOUNTER (OUTPATIENT)
Dept: LAB | Facility: MEDICAL CENTER | Age: 48
End: 2023-02-22
Attending: PHYSICIAN ASSISTANT
Payer: COMMERCIAL

## 2023-02-22 LAB
BASOPHILS # BLD AUTO: 0.8 % (ref 0–1.8)
BASOPHILS # BLD: 0.05 K/UL (ref 0–0.12)
EOSINOPHIL # BLD AUTO: 0.21 K/UL (ref 0–0.51)
EOSINOPHIL NFR BLD: 3.4 % (ref 0–6.9)
ERYTHROCYTE [DISTWIDTH] IN BLOOD BY AUTOMATED COUNT: 46.1 FL (ref 35.9–50)
HCT VFR BLD AUTO: 40.7 % (ref 42–52)
HGB BLD-MCNC: 14 G/DL (ref 14–18)
IMM GRANULOCYTES # BLD AUTO: 0.01 K/UL (ref 0–0.11)
IMM GRANULOCYTES NFR BLD AUTO: 0.2 % (ref 0–0.9)
LYMPHOCYTES # BLD AUTO: 2.54 K/UL (ref 1–4.8)
LYMPHOCYTES NFR BLD: 40.6 % (ref 22–41)
MCH RBC QN AUTO: 30.2 PG (ref 27–33)
MCHC RBC AUTO-ENTMCNC: 34.4 G/DL (ref 33.7–35.3)
MCV RBC AUTO: 87.9 FL (ref 81.4–97.8)
MONOCYTES # BLD AUTO: 0.61 K/UL (ref 0–0.85)
MONOCYTES NFR BLD AUTO: 9.8 % (ref 0–13.4)
NEUTROPHILS # BLD AUTO: 2.83 K/UL (ref 1.82–7.42)
NEUTROPHILS NFR BLD: 45.2 % (ref 44–72)
NRBC # BLD AUTO: 0 K/UL
NRBC BLD-RTO: 0 /100 WBC
PLATELET # BLD AUTO: 333 K/UL (ref 164–446)
PMV BLD AUTO: 9.7 FL (ref 9–12.9)
RBC # BLD AUTO: 4.63 M/UL (ref 4.7–6.1)
TESTOST SERPL-MCNC: 564 NG/DL (ref 175–781)
WBC # BLD AUTO: 6.3 K/UL (ref 4.8–10.8)

## 2023-02-22 PROCEDURE — 85025 COMPLETE CBC W/AUTO DIFF WBC: CPT

## 2023-02-22 PROCEDURE — 84403 ASSAY OF TOTAL TESTOSTERONE: CPT

## 2023-02-22 PROCEDURE — 36415 COLL VENOUS BLD VENIPUNCTURE: CPT

## 2023-03-14 ENCOUNTER — OFFICE VISIT (OUTPATIENT)
Dept: PHYSICAL MEDICINE AND REHAB | Facility: MEDICAL CENTER | Age: 48
End: 2023-03-14
Payer: COMMERCIAL

## 2023-03-14 VITALS
HEIGHT: 70 IN | BODY MASS INDEX: 32.1 KG/M2 | TEMPERATURE: 97 F | OXYGEN SATURATION: 97 % | HEART RATE: 73 BPM | SYSTOLIC BLOOD PRESSURE: 148 MMHG | WEIGHT: 224.21 LBS | DIASTOLIC BLOOD PRESSURE: 94 MMHG

## 2023-03-14 DIAGNOSIS — M79.18 GLUTEAL PAIN: ICD-10-CM

## 2023-03-14 DIAGNOSIS — M54.41 CHRONIC BILATERAL LOW BACK PAIN WITH RIGHT-SIDED SCIATICA: ICD-10-CM

## 2023-03-14 DIAGNOSIS — G89.29 CHRONIC BILATERAL LOW BACK PAIN WITH RIGHT-SIDED SCIATICA: ICD-10-CM

## 2023-03-14 DIAGNOSIS — M54.16 RIGHT LUMBAR RADICULOPATHY: ICD-10-CM

## 2023-03-14 PROCEDURE — 99213 OFFICE O/P EST LOW 20 MIN: CPT | Performed by: STUDENT IN AN ORGANIZED HEALTH CARE EDUCATION/TRAINING PROGRAM

## 2023-03-14 RX ORDER — GABAPENTIN 300 MG/1
300 CAPSULE ORAL
Qty: 90 CAPSULE | Refills: 2 | Status: SHIPPED | OUTPATIENT
Start: 2023-03-14 | End: 2023-07-28 | Stop reason: SDUPTHER

## 2023-03-14 ASSESSMENT — FIBROSIS 4 INDEX: FIB4 SCORE: 0.58

## 2023-03-14 ASSESSMENT — PAIN SCALES - GENERAL: PAINLEVEL: 3=SLIGHT PAIN

## 2023-03-14 ASSESSMENT — PATIENT HEALTH QUESTIONNAIRE - PHQ9: CLINICAL INTERPRETATION OF PHQ2 SCORE: 0

## 2023-03-14 NOTE — PROGRESS NOTES
Follow-up patient Note    Interventional Pain and Spine  Physiatry (Physical Medicine and Rehabilitation)     Patient Name: Danilo Sierra  : 1975  Date of service: 3/14/2023    Chief Complaint:   Chief Complaint   Patient presents with    Follow-Up     Right L5 lumbar radiculopathy       HISTORY (2023):  Danilo Sierra is a 47 y.o. male who presents today with R gluteal pain and R low back pain. Pain originally started as R gluteal pain 10 months ago while deadlifting. Over time the pain has persisted and has fluctuated. His back pain has somewhat improved. Typically his gluteal pain bothers him more than the back pain.  Pain does not radiate past his right glute.     Pain right now is 4/10 on the numeric pain scale. His pain at its best-worse level during the course of the day is typically 4-7/10, respectively.  Pain worsens with bending forward, laying down, and coughing and improves with sitting. The patient endorses tingling in his right foot, especially noticeable at his big toe. Denies focal weakness.     The patient has done physical therapy for this problem with no relief, most recently in Aug 2022.     Patient has tried the following medications with varied success (current meds in bold):   Oral steroids - significant relief  Flexeril - helped for back, not for the glute. No unwanted SE.   Tylenol  Ibuprofen 800mg - unsure relief     Therapeutic modalities and interventional therapies to date include:  -no injections  Chiro - no relief  Massage - no relief     Medical history includes GERD, prediabetes, Hashimotos.    HPI  Today's visit   Danilo Sierra ( 1975) is a male with Diagnoses of Right L5 lumbar radiculopathy, Gluteal pain, and Chronic bilateral low back pain with right-sided sciatica were pertinent to this visit.    Presents today for f/u. Notes significant improvement in pain. Sometimes he has right sided gluteal pain after getting out of the  car which usually subsides with stretching and dissipates after 30 seconds. No longer having radiating pain or pain in the toe. Was unable to do PT due to scheduling constraints. Taking flexeril BID and gabapentin QHS with no unwanted SE. Denies numbness/tingling.      Pain no longer interferes his ability to exercise. Pain doesn't limit him. He would like to avoid injections at this time especially as his pain has improved.    Pain severity 3/10 currently  Pt denies new numbness, tingling, or weakness.      ROS:   Red Flags ROS:   Fever, Chills, Sweats: Denies  Involuntary Weight Loss: Denies  Bladder Incontinence: Denies  Bowel Incontinence: denies  Saddle Anesthesia: Denies    All other systems reviewed and negative.     PMHx:   Past Medical History:   Diagnosis Date    Allergic rhinitis 1/9/2015    GERD (gastroesophageal reflux disease)     Hyperlipidemia     borderline, no meds    Insomnia 1/13/2012    Mild depression 7/6/2021    Subclinical hypothyroidism 5/11/2022       PSHx:   Past Surgical History:   Procedure Laterality Date    DENTAL EXTRACTION(S)      Willard Teeth       Family Hx:   Family History   Problem Relation Age of Onset    Hyperlipidemia Mother     Kidney Disease Mother     Cancer Maternal Grandmother         lung CA/smoker    Cancer Maternal Grandfather         colon    Diabetes Maternal Uncle        Social Hx:  Social History     Socioeconomic History    Marital status: Single     Spouse name: Not on file    Number of children: Not on file    Years of education: Not on file    Highest education level: Some college, no degree   Occupational History    Occupation: [employer]     Employer: [employer]   Tobacco Use    Smoking status: Never    Smokeless tobacco: Never   Vaping Use    Vaping Use: Never used   Substance and Sexual Activity    Alcohol use: Yes     Alcohol/week: 1.2 oz     Types: 2 Cans of beer per week     Comment: 1-2 a week    Drug use: No    Sexual activity: Yes     Partners: Female      Comment: Living with girlfriend   Other Topics Concern    Not on file   Social History Narrative    Not on file     Social Determinants of Health     Financial Resource Strain: Low Risk     Difficulty of Paying Living Expenses: Not very hard   Food Insecurity: No Food Insecurity    Worried About Running Out of Food in the Last Year: Never true    Ran Out of Food in the Last Year: Never true   Transportation Needs: No Transportation Needs    Lack of Transportation (Medical): No    Lack of Transportation (Non-Medical): No   Physical Activity: Sufficiently Active    Days of Exercise per Week: 4 days    Minutes of Exercise per Session: 60 min   Stress: Stress Concern Present    Feeling of Stress : Rather much   Social Connections: Moderately Isolated    Frequency of Communication with Friends and Family: Once a week    Frequency of Social Gatherings with Friends and Family: More than three times a week    Attends Religion Services: Never    Active Member of Clubs or Organizations: No    Attends Club or Organization Meetings: Never    Marital Status: Living with partner   Intimate Partner Violence: Not on file   Housing Stability: Low Risk     Unable to Pay for Housing in the Last Year: No    Number of Places Lived in the Last Year: 1    Unstable Housing in the Last Year: No       Allergies:  No Known Allergies    Medications: reviewed on epic.   Outpatient Medications Marked as Taking for the 3/14/23 encounter (Office Visit) with Latoya Haley M.D.   Medication Sig Dispense Refill    pantoprazole (PROTONIX) 40 MG Tablet Delayed Response Take 1 Tablet by mouth every day. 90 Tablet 1    levothyroxine (SYNTHROID) 25 MCG Tab Take 1 Tablet by mouth every morning on an empty stomach. 90 Tablet 1    cyclobenzaprine (FLEXERIL) 10 mg Tab Take 1 Tablet by mouth 3 times a day as needed for Muscle Spasms or Moderate Pain. 90 Tablet 2    gabapentin (NEURONTIN) 300 MG Cap Take 1 Capsule by mouth at bedtime. 30 Capsule 2     traZODone (DESYREL) 50 MG Tab TAKE 1 TO 2 TABLETS BY MOUTH AT BEDTIME 90 Tablet 3    testosterone cypionate (DEPO-TESTOSTERONE) 200 MG/ML Solution injection testosterone cypionate 200 mg/mL intramuscular oil   INJECT 1 ML EVERY 2 WEEKS BY INTRAMUSCULAR ROUTE      atorvastatin (LIPITOR) 20 MG Tab Take 1 Tablet by mouth every day. 90 Tablet 2    Loratadine (CLARITIN PO) Take  by mouth.          Current Outpatient Medications on File Prior to Visit   Medication Sig Dispense Refill    pantoprazole (PROTONIX) 40 MG Tablet Delayed Response Take 1 Tablet by mouth every day. 90 Tablet 1    levothyroxine (SYNTHROID) 25 MCG Tab Take 1 Tablet by mouth every morning on an empty stomach. 90 Tablet 1    cyclobenzaprine (FLEXERIL) 10 mg Tab Take 1 Tablet by mouth 3 times a day as needed for Muscle Spasms or Moderate Pain. 90 Tablet 2    gabapentin (NEURONTIN) 300 MG Cap Take 1 Capsule by mouth at bedtime. 30 Capsule 2    traZODone (DESYREL) 50 MG Tab TAKE 1 TO 2 TABLETS BY MOUTH AT BEDTIME 90 Tablet 3    testosterone cypionate (DEPO-TESTOSTERONE) 200 MG/ML Solution injection testosterone cypionate 200 mg/mL intramuscular oil   INJECT 1 ML EVERY 2 WEEKS BY INTRAMUSCULAR ROUTE      atorvastatin (LIPITOR) 20 MG Tab Take 1 Tablet by mouth every day. 90 Tablet 2    Loratadine (CLARITIN PO) Take  by mouth.      TRAZODONE & DIET MANAGE PROD PO trazodone (Patient not taking: Reported on 12/14/2022)      testosterone cypionate (DEPO-TESTOSTERONE) 200 MG/ML Solution injection INJECT 1 ML EVERY 2 WEEKS BY INTRAMUSCULAR ROUTE PA DENIED (Patient not taking: Reported on 12/14/2022)       No current facility-administered medications on file prior to visit.         EXAMINATION     Physical Exam:   There were no vitals taken for this visit.    Constitutional:   Body Habitus: There is no height or weight on file to calculate BMI.  Cooperation: Fully cooperates with exam  Appearance: Well-groomed, well-nourished.    Eyes: No scleral icterus to  suggest severe liver disease, no proptosis to suggest severe hyperthyroidism    ENT -no obvious auditory deficits, no noticeable facial droop     Skin -no rashes or lesions noted     Respiratory-  breathing comfortably on room air, no audible wheezing    Cardiovascular-distal extremities warm and well perfused.  No lower extremity edema is noted.     Gastrointestinal - no obvious abdominal masses, non-distended    Psychiatric- alert and oriented ×3. Normal affect.     Gait - normal gait, no use of ambulatory device, nonantalgic.      Musculoskeletal and Neuro -      Thoracic/Lumbar Spine/Sacral Spine/Hips   Inspection: No evidence of atrophy in bilateral lower extremities throughout   There is full active range of motion with lumbar extension     Facet loading maneuver negative bilaterally     Palpation:   Tenderness to palpation over the  right posterior lateral glute. No tenderness to palpation elsewhere in the low back/hips including midline of lumbosacral spine, paraspinal muscles on left, lumbar facets bilaterally spanning approximately L1-L5 levels, sacroiliac joints bilaterally, PSIS bilaterally, and greater trochanters bilaterally.     Lumbar spine /hip provocative exam maneuvers  Straight leg raise positive on right for right gluteal pain, negative on left  FADIR test negative bilaterally  Priformis test neg on right although patient notes tension at his piriformis which he locates to his typical area of pain    SI joint tests  NICOLAS test negative bilaterally  Thigh thrust test negative bilaterally        Key points for the international standards for neurological classification of spinal cord injury (ISNCSCI) to light touch.   Dermatome R L   L2 2 2   L3 2 2   L4 2 2   L5 2 2   S1 2 2   S2 2 2         Motor Exam Lower Extremities  ? Myotome R L   Hip flexion L2 5 5   Knee extension L3 5 5   Ankle dorsiflexion L4 5 5   Toe extension L5 5 5   Ankle plantarflexion S1 5 5        Previous exam  Reflexes  ?   R  L   Patella   2+ 2+   Achilles    2+ 2+      Clonus of the ankle negative bilaterally     MEDICAL DECISION MAKING    Medical records review: see under HPI section.     DATA    Labs: No new labs available for review since last visit.   Lab Results   Component Value Date/Time    SODIUM 141 02/01/2023 06:18 AM    POTASSIUM 4.8 02/01/2023 06:18 AM    CHLORIDE 106 02/01/2023 06:18 AM    CO2 27 02/01/2023 06:18 AM    ANION 8.0 02/01/2023 06:18 AM    GLUCOSE 86 02/01/2023 06:18 AM    BUN 23 (H) 02/01/2023 06:18 AM    CREATININE 1.22 02/01/2023 06:18 AM    CALCIUM 9.6 02/01/2023 06:18 AM    ASTSGOT 30 02/01/2023 06:18 AM    ALTSGPT 53 (H) 02/01/2023 06:18 AM    TBILIRUBIN 0.9 02/01/2023 06:18 AM    ALBUMIN 4.4 02/01/2023 06:18 AM    TOTPROTEIN 7.3 02/01/2023 06:18 AM    GLOBULIN 2.9 02/01/2023 06:18 AM    AGRATIO 1.5 02/01/2023 06:18 AM       No results found for: PROTHROMBTM, INR     Lab Results   Component Value Date/Time    WBC 6.3 02/22/2023 02:14 PM    RBC 4.63 (L) 02/22/2023 02:14 PM    HEMOGLOBIN 14.0 02/22/2023 02:14 PM    HEMATOCRIT 40.7 (L) 02/22/2023 02:14 PM    MCV 87.9 02/22/2023 02:14 PM    MCH 30.2 02/22/2023 02:14 PM    MCHC 34.4 02/22/2023 02:14 PM    MPV 9.7 02/22/2023 02:14 PM    NEUTSPOLYS 45.20 02/22/2023 02:14 PM    LYMPHOCYTES 40.60 02/22/2023 02:14 PM    MONOCYTES 9.80 02/22/2023 02:14 PM    EOSINOPHILS 3.40 02/22/2023 02:14 PM    BASOPHILS 0.80 02/22/2023 02:14 PM        Lab Results   Component Value Date/Time    HBA1C 5.6 02/01/2023 06:18 AM        Imaging:   I personally reviewed following images, these are my reads  MRI lumbar spine 1/16/23  Very mild disc bulge at L2-3, L3-4, and L4-5.  Disc bulge at L4-5 has a small right paracentral disc protrusion likely impinging upon the descending right L5 nerve.  Mild central canal stenosis and right neuroforaminal stenosis at this level.  Bilateral facet arthropathy appearing most notable at L2-3, L3-4, and L4-5. See formal radiology report for further  details.        IMAGING radiology reads. I reviewed the following radiology reads           Results for orders placed during the hospital encounter of 23    MR-LUMBAR SPINE-W/O    Impression  1.  L4-L5 right paracentral disc protrusion which likely impinges on the right L5 nerve in the lateral recess in keeping with history. Mild spinal stenosis and right foraminal narrowing.  2.  Mild lumbar spondylosis elsewhere as detailed.                                                                   Diagnosis  Visit Diagnoses     ICD-10-CM   1. Right L5 lumbar radiculopathy  M54.16   2. Gluteal pain  M79.18   3. Chronic bilateral low back pain with right-sided sciatica  M54.41    G89.29           ASSESSMENT AND PLAN:  Danilo Sierra (: 1975) is a male with significant improvement in right-sided gluteal pain with resolution of radiating pain down his leg and numbness and tingling in his right foot.    Possible element of right piriformis tightness as patient locates his area of pain now to the right piriformis     Danilo was seen today for follow-up.    Diagnoses and all orders for this visit:    Right L5 lumbar radiculopathy    Gluteal pain    Chronic bilateral low back pain with right-sided sciatica            PLAN  Physical Therapy: Previously referred to physical therapy, patient unable to go due to logistical constraints.  Advised him to look up exercises on YouTube for sciatica and piriformis syndrome.       Diagnostic workup: no new imaging needed at this time      Medications:   - continue gabapentin and flexeril.  Discussed that as his pain allows, he may trial downtitrating these medications including by cutting the Flexeril in half.    -Gabapentin refilled today    Interventions:   -At this time due to significant improvement in pain, defer right L5-S1 transforaminal epidural steroid injection as previously discussed    Follow-up: As needed    No orders of the defined types were placed  in this encounter.      Latoya Haley MD  Interventional Pain and Spine  Physical Medicine and Rehabilitation  [facility]      The above note documents my personal evaluation of this patient. In addition, I have reviewed and confirmed with the patient and MA the supportive information documented in today's Patient Health Questionnaire and Office Note.     Please note that this dictation was created using voice recognition software. I have made every reasonable attempt to correct obvious errors, but I expect that there are errors of grammar and possibly content that I did not discover before finalizing the note.

## 2023-05-16 RX ORDER — CYCLOBENZAPRINE HCL 10 MG
10 TABLET ORAL 3 TIMES DAILY PRN
Qty: 90 TABLET | Refills: 0 | Status: SHIPPED | OUTPATIENT
Start: 2023-05-16 | End: 2023-07-20

## 2023-05-16 NOTE — TELEPHONE ENCOUNTER
cyclobenzaprine (FLEXERIL) 10 mg Tab    Received request via: Pharmacy    Was the patient seen in the last year in this department? Yes    Does the patient have an active prescription (recently filled or refills available) for medication(s) requested?  Yes    Does the patient have skilled nursing Plus and need 100 day supply (blood pressure, diabetes and cholesterol meds only)? Patient does not have SCP

## 2023-06-12 DIAGNOSIS — K21.00 GASTROESOPHAGEAL REFLUX DISEASE WITH ESOPHAGITIS WITHOUT HEMORRHAGE: ICD-10-CM

## 2023-06-12 NOTE — TELEPHONE ENCOUNTER
Received request via: Pharmacy    Was the patient seen in the last year in this department? Yes    Does the patient have an active prescription (recently filled or refills available) for medication(s) requested?  yes    Does the patient have nursing home Plus and need 100 day supply (blood pressure, diabetes and cholesterol meds only)? Patient does not have SCP  Requested Prescriptions     Pending Prescriptions Disp Refills    pantoprazole (PROTONIX) 40 MG Tablet Delayed Response [Pharmacy Med Name: PANTOPRAZOLE SOD DR 40 MG TAB] 90 Tablet 3     Sig: TAKE 1 TABLET BY MOUTH EVERY DAY

## 2023-06-13 RX ORDER — PANTOPRAZOLE SODIUM 40 MG/1
40 TABLET, DELAYED RELEASE ORAL DAILY
Qty: 90 TABLET | Refills: 3 | Status: SHIPPED | OUTPATIENT
Start: 2023-06-13 | End: 2023-06-14

## 2023-06-14 ENCOUNTER — OFFICE VISIT (OUTPATIENT)
Dept: MEDICAL GROUP | Facility: MEDICAL CENTER | Age: 48
End: 2023-06-14
Payer: COMMERCIAL

## 2023-06-14 VITALS
BODY MASS INDEX: 30.92 KG/M2 | HEIGHT: 70 IN | SYSTOLIC BLOOD PRESSURE: 158 MMHG | HEART RATE: 74 BPM | DIASTOLIC BLOOD PRESSURE: 104 MMHG | RESPIRATION RATE: 16 BRPM | WEIGHT: 216 LBS | OXYGEN SATURATION: 96 % | TEMPERATURE: 98.3 F

## 2023-06-14 DIAGNOSIS — E78.00 PURE HYPERCHOLESTEROLEMIA: ICD-10-CM

## 2023-06-14 DIAGNOSIS — I10 ESSENTIAL HYPERTENSION: ICD-10-CM

## 2023-06-14 DIAGNOSIS — Z11.59 NEED FOR HEPATITIS C SCREENING TEST: ICD-10-CM

## 2023-06-14 DIAGNOSIS — K21.9 GASTROESOPHAGEAL REFLUX DISEASE WITHOUT ESOPHAGITIS: ICD-10-CM

## 2023-06-14 DIAGNOSIS — Z91.89 AT RISK FOR SLEEP APNEA: ICD-10-CM

## 2023-06-14 DIAGNOSIS — R06.83 SNORING: ICD-10-CM

## 2023-06-14 PROCEDURE — 3077F SYST BP >= 140 MM HG: CPT | Performed by: INTERNAL MEDICINE

## 2023-06-14 PROCEDURE — 99214 OFFICE O/P EST MOD 30 MIN: CPT | Performed by: INTERNAL MEDICINE

## 2023-06-14 PROCEDURE — 3080F DIAST BP >= 90 MM HG: CPT | Performed by: INTERNAL MEDICINE

## 2023-06-14 RX ORDER — LOSARTAN POTASSIUM 50 MG/1
50 TABLET ORAL DAILY
Qty: 90 TABLET | Refills: 3 | Status: SHIPPED | OUTPATIENT
Start: 2023-06-14 | End: 2023-07-19

## 2023-06-14 RX ORDER — OMEPRAZOLE 40 MG/1
40 CAPSULE, DELAYED RELEASE ORAL DAILY
Qty: 90 CAPSULE | Refills: 3 | Status: SHIPPED | OUTPATIENT
Start: 2023-06-14

## 2023-06-14 ASSESSMENT — FIBROSIS 4 INDEX: FIB4 SCORE: 0.59

## 2023-06-14 NOTE — PROGRESS NOTES
Established Patient    Danilo presents today with the following:    CC: Follow-up for chronic medical problems    HPI:   Danilo is a 48 y.o. male who came in for above.    He is feeling fine, came in for regular follow-up.  Found to have high blood pressure.  He did not get a blood pressure machine after last visit.  He is looking up to get it this time.        ROS:   As above    Patient Active Problem List    Diagnosis Date Noted    Essential hypertension 06/14/2023    Family history of colon cancer 09/09/2021    Hypothyroidism due to Hashimoto's thyroiditis 09/09/2021    At risk for sleep apnea 07/06/2021    Mild depression 07/06/2021    Restless leg 07/06/2021    Low testosterone in male 07/14/2020    Family history of diabetes mellitus (DM) 07/13/2020    Prediabetes 07/13/2020    Pure hypercholesterolemia 01/08/2016    Gastroesophageal reflux disease without esophagitis 01/08/2016    Allergic rhinitis 01/09/2015    Insomnia 01/13/2012       Current Outpatient Medications   Medication Sig Dispense Refill    losartan (COZAAR) 50 MG Tab Take 1 Tablet by mouth every day. 90 Tablet 3    omeprazole (PRILOSEC) 40 MG delayed-release capsule Take 1 Capsule by mouth every day. 90 Capsule 3    cyclobenzaprine (FLEXERIL) 10 mg Tab TAKE 1 TABLET BY MOUTH 3 TIMES A DAY AS NEEDED FOR MUSCLE SPASMS OR MODERATE PAIN. 90 Tablet 0    gabapentin (NEURONTIN) 300 MG Cap Take 1 Capsule by mouth at bedtime. 90 Capsule 2    levothyroxine (SYNTHROID) 25 MCG Tab Take 1 Tablet by mouth every morning on an empty stomach. 90 Tablet 1    traZODone (DESYREL) 50 MG Tab TAKE 1 TO 2 TABLETS BY MOUTH AT BEDTIME 90 Tablet 3    testosterone cypionate (DEPO-TESTOSTERONE) 200 MG/ML Solution injection testosterone cypionate 200 mg/mL intramuscular oil   INJECT 1 ML EVERY 2 WEEKS BY INTRAMUSCULAR ROUTE      atorvastatin (LIPITOR) 20 MG Tab Take 1 Tablet by mouth every day. 90 Tablet 2    Loratadine (CLARITIN PO) Take  by mouth.       No  "current facility-administered medications for this visit.         BP (!) 158/104 (Patient Position: Sitting)   Pulse 74   Temp 36.8 °C (98.3 °F) (Temporal)   Resp 16   Ht 1.778 m (5' 10\")   Wt 98 kg (216 lb)   SpO2 96%   BMI 30.99 kg/m²     Physical Exam  General: Alert and oriented, No apparent distress.  Neck: Supple. No cervical or supraclavicular lymphadenopathy noted. Thyroid not enlarged.  Lungs: Clear to auscultation bilaterally without any wheezing, crepitations.  Cardiovascular: Regular rate and rhythm. No murmurs, rubs or gallops.  Abdomen: Bowel sound +, soft, non tender, no rebound or guarding, no palpable organomegaly  Extremities: No edema.      Assessment and Plan    1. Essential hypertension  - start losartan (COZAAR) 50 MG Tab; Take 1 Tablet by mouth every day.  Dispense: 90 Tablet; Refill: 3  -Monitor blood pressure for 2 weeks, send me a message.  Target systolic < 140 for now, eventually < 130.     2. At risk for sleep apnea  - Referral to Pulmonary and Sleep Medicine    3. Snoring  - Referral to Pulmonary and Sleep Medicine    4. Pure hypercholesterolemia  - not optimal during last lab.  He has improved the diet and physical activity. Lost some weight.  This should be improving.  Recommended to repeat lab.  Continue current dose of statin for now.    5. Gastroesophageal reflux disease without esophagitis  Pantoprazole is not covered.  Switch back to omeprazole.  Recommend to use good Rx coupon if it is not covered.  - omeprazole (PRILOSEC) 40 MG delayed-release capsule; Take 1 Capsule by mouth every day.  Dispense: 90 Capsule; Refill: 3    6. Need for hepatitis C screening test  - HEP C VIRUS ANTIBODY; Future        Return in about 6 months (around 12/14/2023).           Signed by: Kala Monzon M.D.    "

## 2023-07-19 ENCOUNTER — OFFICE VISIT (OUTPATIENT)
Dept: MEDICAL GROUP | Facility: MEDICAL CENTER | Age: 48
End: 2023-07-19
Payer: COMMERCIAL

## 2023-07-19 VITALS
WEIGHT: 212 LBS | TEMPERATURE: 98.1 F | SYSTOLIC BLOOD PRESSURE: 152 MMHG | HEART RATE: 90 BPM | RESPIRATION RATE: 16 BRPM | HEIGHT: 70 IN | DIASTOLIC BLOOD PRESSURE: 86 MMHG | BODY MASS INDEX: 30.35 KG/M2 | OXYGEN SATURATION: 95 %

## 2023-07-19 DIAGNOSIS — Z91.89 AT RISK FOR SLEEP APNEA: ICD-10-CM

## 2023-07-19 DIAGNOSIS — I10 ESSENTIAL HYPERTENSION: ICD-10-CM

## 2023-07-19 DIAGNOSIS — M25.512 CHRONIC LEFT SHOULDER PAIN: ICD-10-CM

## 2023-07-19 DIAGNOSIS — E78.5 HYPERLIPIDEMIA, UNSPECIFIED HYPERLIPIDEMIA TYPE: ICD-10-CM

## 2023-07-19 DIAGNOSIS — G89.29 CHRONIC LEFT SHOULDER PAIN: ICD-10-CM

## 2023-07-19 DIAGNOSIS — R06.83 SNORING: ICD-10-CM

## 2023-07-19 PROCEDURE — 3079F DIAST BP 80-89 MM HG: CPT | Performed by: INTERNAL MEDICINE

## 2023-07-19 PROCEDURE — 99214 OFFICE O/P EST MOD 30 MIN: CPT | Performed by: INTERNAL MEDICINE

## 2023-07-19 PROCEDURE — 3077F SYST BP >= 140 MM HG: CPT | Performed by: INTERNAL MEDICINE

## 2023-07-19 RX ORDER — LOSARTAN POTASSIUM 100 MG/1
100 TABLET ORAL DAILY
Qty: 90 TABLET | Refills: 3 | Status: SHIPPED | OUTPATIENT
Start: 2023-07-19

## 2023-07-19 RX ORDER — ATORVASTATIN CALCIUM 20 MG/1
20 TABLET, FILM COATED ORAL
Qty: 90 TABLET | Refills: 3 | Status: SHIPPED | OUTPATIENT
Start: 2023-07-19 | End: 2023-09-12

## 2023-07-19 ASSESSMENT — FIBROSIS 4 INDEX: FIB4 SCORE: 0.59

## 2023-07-19 NOTE — PROGRESS NOTES
Established Patient    Danilo presents today with the following:    CC: High blood pressure    HPI:   Danilo is a 48 y.o. male who came in for above.    His blood pressure is not under control.  Remains above 150 systolic despite taking losartan 50 mg daily.  He got an appointment with sleep medicine to screen for sleep apnea but it is in February next year.      ROS:   As above    Patient Active Problem List    Diagnosis Date Noted    Essential hypertension 06/14/2023    Family history of colon cancer 09/09/2021    Hypothyroidism due to Hashimoto's thyroiditis 09/09/2021    At risk for sleep apnea 07/06/2021    Mild depression 07/06/2021    Restless leg 07/06/2021    Low testosterone in male 07/14/2020    Family history of diabetes mellitus (DM) 07/13/2020    Prediabetes 07/13/2020    Pure hypercholesterolemia 01/08/2016    Gastroesophageal reflux disease without esophagitis 01/08/2016    Allergic rhinitis 01/09/2015    Insomnia 01/13/2012       Current Outpatient Medications   Medication Sig Dispense Refill    losartan (COZAAR) 100 MG Tab Take 1 Tablet by mouth every day. 90 Tablet 3    atorvastatin (LIPITOR) 20 MG Tab Take 1 Tablet by mouth every day. 90 Tablet 3    omeprazole (PRILOSEC) 40 MG delayed-release capsule Take 1 Capsule by mouth every day. 90 Capsule 3    cyclobenzaprine (FLEXERIL) 10 mg Tab TAKE 1 TABLET BY MOUTH 3 TIMES A DAY AS NEEDED FOR MUSCLE SPASMS OR MODERATE PAIN. 90 Tablet 0    gabapentin (NEURONTIN) 300 MG Cap Take 1 Capsule by mouth at bedtime. 90 Capsule 2    levothyroxine (SYNTHROID) 25 MCG Tab Take 1 Tablet by mouth every morning on an empty stomach. 90 Tablet 1    traZODone (DESYREL) 50 MG Tab TAKE 1 TO 2 TABLETS BY MOUTH AT BEDTIME 90 Tablet 3    testosterone cypionate (DEPO-TESTOSTERONE) 200 MG/ML Solution injection testosterone cypionate 200 mg/mL intramuscular oil   INJECT 1 ML EVERY 2 WEEKS BY INTRAMUSCULAR ROUTE      Loratadine (CLARITIN PO) Take  by mouth.       No  "current facility-administered medications for this visit.         BP (!) 152/86 (Patient Position: Sitting)   Pulse 90   Temp 36.7 °C (98.1 °F) (Temporal)   Resp 16   Ht 1.778 m (5' 10\")   Wt 96.2 kg (212 lb)   SpO2 95%   BMI 30.42 kg/m²     Physical Exam  General: Alert and oriented, No apparent distress.  Lungs: Clear to auscultation bilaterally without any wheezing, crepitations.  Cardiovascular: Regular rate and rhythm. No murmurs, rubs or gallops.  Abdomen: Bowel sound +, soft, non tender, no rebound or guarding, no palpable organomegaly  Extremities: No edema.      Assessment and Plan    1. Essential hypertension  -Increase losartan to 100 mg daily.  Likely need to get sleep study done and treat sleep apnea if present to improve blood pressure.  Losartan (COZAAR) 100 MG Tab; Take 1 Tablet by mouth every day.  Dispense: 90 Tablet; Refill: 3    2. Hyperlipidemia, unspecified hyperlipidemia type  Due for labs.  He is going Friday.  If LDL is not at target, will increase Lipitor but he has been losing weight by healthy lifestyle.  - atorvastatin (LIPITOR) 20 MG Tab; Take 1 Tablet by mouth every day.  Dispense: 90 Tablet; Refill: 3    3. At risk for sleep apnea  4. Snoring  Trying outside referral to check for earlier appointment.  - Referral to Pulmonary and Sleep Medicine    5. Chronic left shoulder pain  Started 2 months ago.  Discomfort with certain activities such as lifting up above shoulder level, sleeping on it. He does lift heavy weight.  Exam did not show significant limitation in mobility or impairment in strength. Monitor clinically. Ortho if it continues to limit his physical activity.      Return in about 6 months (around 1/19/2024).           Signed by: Kala Monzon M.D.    "

## 2023-07-20 RX ORDER — CYCLOBENZAPRINE HCL 10 MG
10 TABLET ORAL 3 TIMES DAILY PRN
Qty: 90 TABLET | Refills: 0 | Status: SHIPPED | OUTPATIENT
Start: 2023-07-20 | End: 2023-08-24

## 2023-07-20 NOTE — TELEPHONE ENCOUNTER
cyclobenzaprine (FLEXERIL) 10 mg Tab    Received request via: Pharmacy    Was the patient seen in the last year in this department? Yes    Does the patient have an active prescription (recently filled or refills available) for medication(s) requested?  Yes    Does the patient have custodial Plus and need 100 day supply (blood pressure, diabetes and cholesterol meds only)? Patient does not have SCP

## 2023-07-28 ENCOUNTER — OFFICE VISIT (OUTPATIENT)
Dept: PHYSICAL MEDICINE AND REHAB | Facility: MEDICAL CENTER | Age: 48
End: 2023-07-28
Payer: COMMERCIAL

## 2023-07-28 VITALS
OXYGEN SATURATION: 96 % | SYSTOLIC BLOOD PRESSURE: 152 MMHG | HEIGHT: 70 IN | DIASTOLIC BLOOD PRESSURE: 84 MMHG | WEIGHT: 212.74 LBS | HEART RATE: 78 BPM | BODY MASS INDEX: 30.46 KG/M2 | TEMPERATURE: 98.5 F

## 2023-07-28 DIAGNOSIS — M54.41 CHRONIC BILATERAL LOW BACK PAIN WITH RIGHT-SIDED SCIATICA: ICD-10-CM

## 2023-07-28 DIAGNOSIS — G89.29 CHRONIC BILATERAL LOW BACK PAIN WITH RIGHT-SIDED SCIATICA: ICD-10-CM

## 2023-07-28 DIAGNOSIS — M79.18 GLUTEAL PAIN: ICD-10-CM

## 2023-07-28 DIAGNOSIS — M54.16 RIGHT LUMBAR RADICULOPATHY: Primary | ICD-10-CM

## 2023-07-28 PROCEDURE — 3077F SYST BP >= 140 MM HG: CPT | Performed by: STUDENT IN AN ORGANIZED HEALTH CARE EDUCATION/TRAINING PROGRAM

## 2023-07-28 PROCEDURE — 3079F DIAST BP 80-89 MM HG: CPT | Performed by: STUDENT IN AN ORGANIZED HEALTH CARE EDUCATION/TRAINING PROGRAM

## 2023-07-28 PROCEDURE — 99214 OFFICE O/P EST MOD 30 MIN: CPT | Performed by: STUDENT IN AN ORGANIZED HEALTH CARE EDUCATION/TRAINING PROGRAM

## 2023-07-28 RX ORDER — GABAPENTIN 300 MG/1
600 CAPSULE ORAL
Qty: 90 CAPSULE | Refills: 2 | Status: SHIPPED | OUTPATIENT
Start: 2023-07-28

## 2023-07-28 ASSESSMENT — PATIENT HEALTH QUESTIONNAIRE - PHQ9: CLINICAL INTERPRETATION OF PHQ2 SCORE: 0

## 2023-07-28 ASSESSMENT — PAIN SCALES - GENERAL: PAINLEVEL: 4=SLIGHT-MODERATE PAIN

## 2023-07-28 ASSESSMENT — FIBROSIS 4 INDEX: FIB4 SCORE: 0.59

## 2023-07-28 NOTE — PROGRESS NOTES
Follow-up patient Note    Interventional Pain and Spine  Physiatry (Physical Medicine and Rehabilitation)     Patient Name: Danilo Sierra  : 1975  Date of service: 2023    Chief Complaint:   Chief Complaint   Patient presents with    Follow-Up     Right L5 lumbar radiculopathy       HISTORY (2023):  Danilo Sierra is a 47 y.o. male who presents today with R gluteal pain and R low back pain. Pain originally started as R gluteal pain 10 months ago while deadlifting. Over time the pain has persisted and has fluctuated. His back pain has somewhat improved. Typically his gluteal pain bothers him more than the back pain.  Pain does not radiate past his right glute.     Pain right now is 4/10 on the numeric pain scale. His pain at its best-worse level during the course of the day is typically 4-7/10, respectively.  Pain worsens with bending forward, laying down, and coughing and improves with sitting. The patient endorses tingling in his right foot, especially noticeable at his big toe. Denies focal weakness.     The patient has done physical therapy for this problem with no relief, most recently in Aug 2022.     Patient has tried the following medications with varied success (current meds in bold):   Oral steroids - significant relief  Flexeril - helped for back, not for the glute. No unwanted SE.   Tylenol  Ibuprofen 800mg - unsure relief     Therapeutic modalities and interventional therapies to date include:  -no injections  Chiro - no relief  Massage - no relief     Medical history includes GERD, prediabetes, Hashimotos.    HPI  Today's visit   Danilo Sierra ( 1975) is a male with The primary encounter diagnosis was Right L5 lumbar radiculopathy. Diagnoses of Gluteal pain and Chronic bilateral low back pain with right-sided sciatica were also pertinent to this visit.    Presents today for f/u. Notes worsened pain since last visit. Pain radiating from his right  low back to his right mid-posterior thigh. Feels similar to the pain that previously radiated to his right toes at his initial visit. 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 Unsure if he is having numbness or tingling. Denies focal weakness in the leg.     Taking flexeril 10mg BID and gabapentin QHS with no unwanted SE.     Pain severity 4/10 currently    ROS:   Red Flags ROS:   Fever, Chills, Sweats: Denies  Involuntary Weight Loss: Denies  Bladder Incontinence: Denies  Bowel Incontinence: denies  Saddle Anesthesia: Denies    All other systems reviewed and negative.     PMHx:   Past Medical History:   Diagnosis Date    Allergic rhinitis 1/9/2015    Essential hypertension 6/14/2023    GERD (gastroesophageal reflux disease)     Hyperlipidemia     borderline, no meds    Insomnia 1/13/2012    Mild depression 7/6/2021    Subclinical hypothyroidism 5/11/2022       PSHx:   Past Surgical History:   Procedure Laterality Date    DENTAL EXTRACTION(S)      [place] Teeth       Family Hx:   Family History   Problem Relation Age of Onset    Hyperlipidemia Mother     Kidney Disease Mother     Cancer Maternal Grandmother         lung CA/smoker    Cancer Maternal Grandfather         colon    Diabetes Maternal Uncle        Social Hx:  Social History     Socioeconomic History    Marital status: Single     Spouse name: Not on file    Number of children: Not on file    Years of education: Not on file    Highest education level: Some college, no degree   Occupational History    Occupation:      Employer: [employer]   Tobacco Use    Smoking status: Never    Smokeless tobacco: Never   Vaping Use    Vaping Use: Never used   Substance and Sexual Activity    Alcohol use: Yes     Alcohol/week: 1.2 oz     Types: 2 Cans of beer per week     Comment: 1-2 a week    Drug use: No    Sexual activity: Yes     Partners: Female     Comment: Living with girlfriend   Other Topics Concern     Not on file   Social History Narrative    Not on file     Social Determinants of Health     Financial Resource Strain: Low Risk  (5/10/2022)    Overall Financial Resource Strain (CARDIA)     Difficulty of Paying Living Expenses: Not very hard   Food Insecurity: No Food Insecurity (5/10/2022)    Hunger Vital Sign     Worried About Running Out of Food in the Last Year: Never true     Ran Out of Food in the Last Year: Never true   Transportation Needs: No Transportation Needs (5/10/2022)    PRAPARE - Transportation     Lack of Transportation (Medical): No     Lack of Transportation (Non-Medical): No   Physical Activity: Sufficiently Active (5/10/2022)    Exercise Vital Sign     Days of Exercise per Week: 4 days     Minutes of Exercise per Session: 60 min   Stress: Stress Concern Present (5/10/2022)    [place] [employer] - Occupational Stress Questionnaire     Feeling of Stress : Rather much   Social Connections: Moderately Isolated (5/10/2022)    Social Connection and Isolation Panel [NHANES]     Frequency of Communication with Friends and Family: Once a week     Frequency of Social Gatherings with Friends and Family: More than three times a week     Attends Judaism Services: Never     Active Member of Clubs or Organizations: No     Attends Club or Organization Meetings: Never     Marital Status: Living with partner   Intimate Partner Violence: Not on file   Housing Stability: Low Risk  (5/10/2022)    Housing Stability Vital Sign     Unable to Pay for Housing in the Last Year: No     Number of Places Lived in the Last Year: 1     Unstable Housing in the Last Year: No       Allergies:  No Known Allergies    Medications: reviewed on epic.   Outpatient Medications Marked as Taking for the 7/28/23 encounter (Office Visit) with Latoya Haley M.D.   Medication Sig Dispense Refill    gabapentin (NEURONTIN) 300 MG Cap Take 2 Capsules by mouth at bedtime. 90 Capsule 2    cyclobenzaprine (FLEXERIL) 10  mg Tab TAKE 1 TABLET BY MOUTH 3 TIMES A DAY AS NEEDED FOR MUSCLE SPASMS OR MODERATE PAIN. 90 Tablet 0    losartan (COZAAR) 100 MG Tab Take 1 Tablet by mouth every day. 90 Tablet 3    atorvastatin (LIPITOR) 20 MG Tab Take 1 Tablet by mouth every day. 90 Tablet 3    omeprazole (PRILOSEC) 40 MG delayed-release capsule Take 1 Capsule by mouth every day. 90 Capsule 3    levothyroxine (SYNTHROID) 25 MCG Tab Take 1 Tablet by mouth every morning on an empty stomach. 90 Tablet 1    traZODone (DESYREL) 50 MG Tab TAKE 1 TO 2 TABLETS BY MOUTH AT BEDTIME 90 Tablet 3    testosterone cypionate (DEPO-TESTOSTERONE) 200 MG/ML Solution injection testosterone cypionate 200 mg/mL intramuscular oil   INJECT 1 ML EVERY 2 WEEKS BY INTRAMUSCULAR ROUTE      Loratadine (CLARITIN PO) Take  by mouth.          Current Outpatient Medications on File Prior to Visit   Medication Sig Dispense Refill    cyclobenzaprine (FLEXERIL) 10 mg Tab TAKE 1 TABLET BY MOUTH 3 TIMES A DAY AS NEEDED FOR MUSCLE SPASMS OR MODERATE PAIN. 90 Tablet 0    losartan (COZAAR) 100 MG Tab Take 1 Tablet by mouth every day. 90 Tablet 3    atorvastatin (LIPITOR) 20 MG Tab Take 1 Tablet by mouth every day. 90 Tablet 3    omeprazole (PRILOSEC) 40 MG delayed-release capsule Take 1 Capsule by mouth every day. 90 Capsule 3    levothyroxine (SYNTHROID) 25 MCG Tab Take 1 Tablet by mouth every morning on an empty stomach. 90 Tablet 1    traZODone (DESYREL) 50 MG Tab TAKE 1 TO 2 TABLETS BY MOUTH AT BEDTIME 90 Tablet 3    testosterone cypionate (DEPO-TESTOSTERONE) 200 MG/ML Solution injection testosterone cypionate 200 mg/mL intramuscular oil   INJECT 1 ML EVERY 2 WEEKS BY INTRAMUSCULAR ROUTE      Loratadine (CLARITIN PO) Take  by mouth.       No current facility-administered medications on file prior to visit.         EXAMINATION     Physical Exam:   BP (!) 152/84 (BP Location: Right arm, Patient Position: Sitting, BP Cuff Size: Adult)   Pulse 78   Temp 36.9 °C (98.5 °F) (Temporal)    "Ht 1.778 m (5' 10\")   Wt 96.5 kg (212 lb 11.9 oz)   SpO2 96%     Constitutional:   Body Habitus: Body mass index is 30.53 kg/m².  Cooperation: Fully cooperates with exam  Appearance: Well-groomed, well-nourished.    Eyes: No scleral icterus to suggest severe liver disease, no proptosis to suggest severe hyperthyroidism    ENT -no obvious auditory deficits, no noticeable facial droop     Skin -no rashes or lesions noted     Respiratory-  breathing comfortably on room air, no audible wheezing    Cardiovascular-distal extremities warm and well perfused.  No lower extremity edema is noted.     Gastrointestinal - no obvious abdominal masses, non-distended    Psychiatric- alert and oriented ×3. Normal affect.     Gait - normal gait, no use of ambulatory device, nonantalgic.      Musculoskeletal and Neuro -      Thoracic/Lumbar Spine/Sacral Spine/Hips   Inspection: No evidence of atrophy in bilateral lower extremities throughout   There is full active range of motion with lumbar extension     Facet loading maneuver negative bilaterally     Palpation:   Tenderness to palpation over the  right posterior lateral glute. No tenderness to palpation elsewhere in the low back/hips including midline of lumbosacral spine, paraspinal muscles on left, lumbar facets bilaterally spanning approximately L1-L5 levels, sacroiliac joints bilaterally, PSIS bilaterally, and greater trochanters bilaterally.     Lumbar spine /hip provocative exam maneuvers  Straight leg raise negative bilaterally  FADIR test negative bilaterally      SI joint tests  NICOLAS test negative bilaterally  Thigh thrust test negative bilaterally        Key points for the international standards for neurological classification of spinal cord injury (ISNCSCI) to light touch.   Dermatome R L   L2 2 2   L3 2 2   L4 2 2   L5 2 2   S1 2 2   S2 2 2         Motor Exam Lower Extremities  ? Myotome R L   Hip flexion L2 5 5   Knee extension L3 5 5   Ankle dorsiflexion L4 5 5 "   Toe extension L5 5 5   Ankle plantarflexion S1 5 5        Previous exam  Priformis test neg on right although patient notes tension at his piriformis which he locates to his typical area of pain    Reflexes  ?   R L   Patella   2+ 2+   Achilles    2+ 2+      Clonus of the ankle negative bilaterally     MEDICAL DECISION MAKING    Medical records review: see under HPI section.     DATA    Labs: No new labs available for review since last visit.   Lab Results   Component Value Date/Time    SODIUM 141 02/01/2023 06:18 AM    POTASSIUM 4.8 02/01/2023 06:18 AM    CHLORIDE 106 02/01/2023 06:18 AM    CO2 27 02/01/2023 06:18 AM    ANION 8.0 02/01/2023 06:18 AM    GLUCOSE 86 02/01/2023 06:18 AM    BUN 23 (H) 02/01/2023 06:18 AM    CREATININE 1.22 02/01/2023 06:18 AM    CALCIUM 9.6 02/01/2023 06:18 AM    ASTSGOT 30 02/01/2023 06:18 AM    ALTSGPT 53 (H) 02/01/2023 06:18 AM    TBILIRUBIN 0.9 02/01/2023 06:18 AM    ALBUMIN 4.4 02/01/2023 06:18 AM    TOTPROTEIN 7.3 02/01/2023 06:18 AM    GLOBULIN 2.9 02/01/2023 06:18 AM    AGRATIO 1.5 02/01/2023 06:18 AM       No results found for: PROTHROMBTM, INR     Lab Results   Component Value Date/Time    WBC 6.3 02/22/2023 02:14 PM    RBC 4.63 (L) 02/22/2023 02:14 PM    HEMOGLOBIN 14.0 02/22/2023 02:14 PM    HEMATOCRIT 40.7 (L) 02/22/2023 02:14 PM    MCV 87.9 02/22/2023 02:14 PM    MCH 30.2 02/22/2023 02:14 PM    MCHC 34.4 02/22/2023 02:14 PM    MPV 9.7 02/22/2023 02:14 PM    NEUTSPOLYS 45.20 02/22/2023 02:14 PM    LYMPHOCYTES 40.60 02/22/2023 02:14 PM    MONOCYTES 9.80 02/22/2023 02:14 PM    EOSINOPHILS 3.40 02/22/2023 02:14 PM    BASOPHILS 0.80 02/22/2023 02:14 PM        Lab Results   Component Value Date/Time    HBA1C 5.6 02/01/2023 06:18 AM        Imaging:   I personally reviewed following images, these are my reads  MRI lumbar spine 1/16/23  Very mild disc bulge at L2-3, L3-4, and L4-5.  Disc bulge at L4-5 has a small right paracentral disc protrusion likely impinging upon the  descending right L5 nerve.  Mild central canal stenosis and right neuroforaminal stenosis at this level.  Bilateral facet arthropathy appearing most notable at L2-3, L3-4, and L4-5. See formal radiology report for further details.        IMAGING radiology reads. I reviewed the following radiology reads           Results for orders placed during the hospital encounter of 23    MR-LUMBAR SPINE-W/O    Impression  1.  L4-L5 right paracentral disc protrusion which likely impinges on the right L5 nerve in the lateral recess in keeping with history. Mild spinal stenosis and right foraminal narrowing.  2.  Mild lumbar spondylosis elsewhere as detailed.                                                                   Diagnosis  Visit Diagnoses     ICD-10-CM   1. Right L5 lumbar radiculopathy  M54.16   2. Gluteal pain  M79.18   3. Chronic bilateral low back pain with right-sided sciatica  M54.41    G89.29             ASSESSMENT AND PLAN:  Danilo Sierra (: 1975) is a male with worsened right-sided gluteal pain radiating down his right leg.    Possible element of right piriformis tightness as patient locates his area of pain now to the right piriformis     Danilo was seen today for follow-up.    Diagnoses and all orders for this visit:    Right L5 lumbar radiculopathy  -     Referral to Physical Therapy    Gluteal pain  -     Referral to Physical Therapy    Chronic bilateral low back pain with right-sided sciatica  -     Referral to Physical Therapy    Other orders  -     gabapentin (NEURONTIN) 300 MG Cap; Take 2 Capsules by mouth at bedtime.              PLAN  Physical Therapy: referral to PT today. Discussed that this would be the best long term treatment to improvement his pain and minimize the chance of it recurring with or without an injection    Diagnostic workup: no new imaging needed at this time    Medications:   - discussed that he should continue gabapentin and flexeril.  I have  discussed that as his pain allows, he may trial downtitrating these medications including by cutting the Flexeril in half.    -continue gabapentin    Interventions:   -discussed possible right L5-S1 transforaminal epidural steroid injection. The patient is apprehensive about pursuing injections. He feels that his pain is tolerable and doesn't significantly limit him at this time. Discussed that it is reasonable to expect that his pain would resolve with PT alone, but if his pain doesn't resolve with PT or worsens, an injection could be considered.    Follow-up: 3 months to assess progress with PT or sooner as needed    Orders Placed This Encounter    Referral to Physical Therapy    gabapentin (NEURONTIN) 300 MG Cap       Latoya Haley MD  Interventional Pain and Spine  Physical Medicine and Rehabilitation  [facility]      The above note documents my personal evaluation of this patient. In addition, I have reviewed and confirmed with the patient and MA the supportive information documented in today's Patient Health Questionnaire and Office Note.     Please note that this dictation was created using voice recognition software. I have made every reasonable attempt to correct obvious errors, but I expect that there are errors of grammar and possibly content that I did not discover before finalizing the note.

## 2023-08-24 DIAGNOSIS — R76.8 ANTI-TPO ANTIBODIES PRESENT: ICD-10-CM

## 2023-08-24 RX ORDER — LEVOTHYROXINE SODIUM 0.03 MG/1
25 TABLET ORAL
Qty: 90 TABLET | Refills: 1 | Status: SHIPPED | OUTPATIENT
Start: 2023-08-24 | End: 2024-01-02 | Stop reason: SDUPTHER

## 2023-09-11 ENCOUNTER — HOSPITAL ENCOUNTER (OUTPATIENT)
Dept: LAB | Facility: MEDICAL CENTER | Age: 48
End: 2023-09-11
Attending: INTERNAL MEDICINE
Payer: COMMERCIAL

## 2023-09-11 DIAGNOSIS — E78.00 PURE HYPERCHOLESTEROLEMIA: ICD-10-CM

## 2023-09-11 DIAGNOSIS — R73.03 PREDIABETES: ICD-10-CM

## 2023-09-11 DIAGNOSIS — R76.8 ANTI-TPO ANTIBODIES PRESENT: ICD-10-CM

## 2023-09-11 DIAGNOSIS — Z11.59 NEED FOR HEPATITIS C SCREENING TEST: ICD-10-CM

## 2023-09-11 LAB
ALBUMIN SERPL BCP-MCNC: 4.4 G/DL (ref 3.2–4.9)
ALBUMIN/GLOB SERPL: 1.5 G/DL
ALP SERPL-CCNC: 76 U/L (ref 30–99)
ALT SERPL-CCNC: 40 U/L (ref 2–50)
ANION GAP SERPL CALC-SCNC: 7 MMOL/L (ref 7–16)
AST SERPL-CCNC: 41 U/L (ref 12–45)
BILIRUB SERPL-MCNC: 0.7 MG/DL (ref 0.1–1.5)
BUN SERPL-MCNC: 22 MG/DL (ref 8–22)
CALCIUM ALBUM COR SERPL-MCNC: 9 MG/DL (ref 8.5–10.5)
CALCIUM SERPL-MCNC: 9.3 MG/DL (ref 8.5–10.5)
CHLORIDE SERPL-SCNC: 104 MMOL/L (ref 96–112)
CHOLEST SERPL-MCNC: 152 MG/DL (ref 100–199)
CO2 SERPL-SCNC: 29 MMOL/L (ref 20–33)
CREAT SERPL-MCNC: 1.21 MG/DL (ref 0.5–1.4)
EST. AVERAGE GLUCOSE BLD GHB EST-MCNC: 111 MG/DL
FASTING STATUS PATIENT QL REPORTED: NORMAL
GFR SERPLBLD CREATININE-BSD FMLA CKD-EPI: 74 ML/MIN/1.73 M 2
GLOBULIN SER CALC-MCNC: 2.9 G/DL (ref 1.9–3.5)
GLUCOSE SERPL-MCNC: 107 MG/DL (ref 65–99)
HBA1C MFR BLD: 5.5 % (ref 4–5.6)
HCV AB SER QL: NORMAL
HDLC SERPL-MCNC: 28 MG/DL
LDLC SERPL CALC-MCNC: 108 MG/DL
POTASSIUM SERPL-SCNC: 4.6 MMOL/L (ref 3.6–5.5)
PROT SERPL-MCNC: 7.3 G/DL (ref 6–8.2)
SODIUM SERPL-SCNC: 140 MMOL/L (ref 135–145)
TRIGL SERPL-MCNC: 80 MG/DL (ref 0–149)
TSH SERPL DL<=0.005 MIU/L-ACNC: 2.59 UIU/ML (ref 0.38–5.33)

## 2023-09-11 PROCEDURE — 86803 HEPATITIS C AB TEST: CPT

## 2023-09-11 PROCEDURE — 36415 COLL VENOUS BLD VENIPUNCTURE: CPT

## 2023-09-11 PROCEDURE — 83036 HEMOGLOBIN GLYCOSYLATED A1C: CPT

## 2023-09-11 PROCEDURE — 80061 LIPID PANEL: CPT

## 2023-09-11 PROCEDURE — 84443 ASSAY THYROID STIM HORMONE: CPT

## 2023-09-11 PROCEDURE — 80053 COMPREHEN METABOLIC PANEL: CPT

## 2023-09-12 DIAGNOSIS — E78.5 HYPERLIPIDEMIA, UNSPECIFIED HYPERLIPIDEMIA TYPE: ICD-10-CM

## 2023-09-12 RX ORDER — ATORVASTATIN CALCIUM 40 MG/1
40 TABLET, FILM COATED ORAL DAILY
Qty: 90 TABLET | Refills: 3 | Status: SHIPPED | OUTPATIENT
Start: 2023-09-12 | End: 2024-03-06 | Stop reason: SDUPTHER

## 2023-10-30 ENCOUNTER — TELEPHONE (OUTPATIENT)
Dept: HEALTH INFORMATION MANAGEMENT | Facility: OTHER | Age: 48
End: 2023-10-30
Payer: COMMERCIAL

## 2023-11-09 ENCOUNTER — APPOINTMENT (OUTPATIENT)
Dept: PHYSICAL MEDICINE AND REHAB | Facility: MEDICAL CENTER | Age: 48
End: 2023-11-09
Payer: COMMERCIAL

## 2023-12-27 ENCOUNTER — OFFICE VISIT (OUTPATIENT)
Dept: MEDICAL GROUP | Facility: MEDICAL CENTER | Age: 48
End: 2023-12-27
Payer: COMMERCIAL

## 2023-12-27 VITALS
WEIGHT: 222 LBS | OXYGEN SATURATION: 96 % | TEMPERATURE: 98.2 F | HEIGHT: 70 IN | BODY MASS INDEX: 31.78 KG/M2 | DIASTOLIC BLOOD PRESSURE: 80 MMHG | HEART RATE: 69 BPM | SYSTOLIC BLOOD PRESSURE: 120 MMHG

## 2023-12-27 DIAGNOSIS — I10 ESSENTIAL HYPERTENSION: ICD-10-CM

## 2023-12-27 DIAGNOSIS — E78.00 PURE HYPERCHOLESTEROLEMIA: ICD-10-CM

## 2023-12-27 DIAGNOSIS — R73.01 IFG (IMPAIRED FASTING GLUCOSE): ICD-10-CM

## 2023-12-27 DIAGNOSIS — R06.81 APNEA: ICD-10-CM

## 2023-12-27 DIAGNOSIS — E06.3 HYPOTHYROIDISM DUE TO HASHIMOTO'S THYROIDITIS: ICD-10-CM

## 2023-12-27 DIAGNOSIS — E03.8 HYPOTHYROIDISM DUE TO HASHIMOTO'S THYROIDITIS: ICD-10-CM

## 2023-12-27 DIAGNOSIS — R06.83 SNORING: ICD-10-CM

## 2023-12-27 PROCEDURE — 99214 OFFICE O/P EST MOD 30 MIN: CPT | Performed by: INTERNAL MEDICINE

## 2023-12-27 PROCEDURE — 3079F DIAST BP 80-89 MM HG: CPT | Performed by: INTERNAL MEDICINE

## 2023-12-27 PROCEDURE — 3074F SYST BP LT 130 MM HG: CPT | Performed by: INTERNAL MEDICINE

## 2023-12-27 ASSESSMENT — FIBROSIS 4 INDEX: FIB4 SCORE: 0.93

## 2023-12-28 NOTE — PROGRESS NOTES
Established Patient    Danilo presents today with the following:    CC: Follow-up for chronic medical problems    HPI:   Danilo is a 48 y.o. male who came in for above.    No new issues.  He has checked with his fiancée who noted apnea episodes. He is seeing sleep med in Feb.        ROS:   As above    Patient Active Problem List    Diagnosis Date Noted    Essential hypertension 06/14/2023    Family history of colon cancer 09/09/2021    Hypothyroidism due to Hashimoto's thyroiditis 09/09/2021    At risk for sleep apnea 07/06/2021    Mild depression 07/06/2021    Restless leg 07/06/2021    Low testosterone in male 07/14/2020    Family history of diabetes mellitus (DM) 07/13/2020    Prediabetes 07/13/2020    Pure hypercholesterolemia 01/08/2016    Gastroesophageal reflux disease without esophagitis 01/08/2016    Allergic rhinitis 01/09/2015    Insomnia 01/13/2012       Current Outpatient Medications   Medication Sig Dispense Refill    levothyroxine (SYNTHROID) 25 MCG Tab TAKE 1 TABLET BY MOUTH EVERY DAY IN THE MORNING ON AN EMPTY STOMACH (Patient not taking: Reported on 12/27/2023) 90 Tablet 1    atorvastatin (LIPITOR) 40 MG Tab Take 1 Tablet by mouth every day. 90 Tablet 3    cyclobenzaprine (FLEXERIL) 10 mg Tab TAKE 1 TABLET BY MOUTH 3 TIMES A DAY AS NEEDED FOR MUSCLE SPASMS OR MODERATE PAIN. 90 Tablet 0    gabapentin (NEURONTIN) 300 MG Cap Take 2 Capsules by mouth at bedtime. 90 Capsule 2    losartan (COZAAR) 100 MG Tab Take 1 Tablet by mouth every day. 90 Tablet 3    omeprazole (PRILOSEC) 40 MG delayed-release capsule Take 1 Capsule by mouth every day. 90 Capsule 3    traZODone (DESYREL) 50 MG Tab TAKE 1 TO 2 TABLETS BY MOUTH AT BEDTIME 90 Tablet 3    testosterone cypionate (DEPO-TESTOSTERONE) 200 MG/ML Solution injection testosterone cypionate 200 mg/mL intramuscular oil   INJECT 1 ML EVERY 2 WEEKS BY INTRAMUSCULAR ROUTE      Loratadine (CLARITIN PO) Take  by mouth.       No current  "facility-administered medications for this visit.         /80 (BP Location: Left arm, Patient Position: Sitting, BP Cuff Size: Adult)   Pulse 69   Temp 36.8 °C (98.2 °F) (Temporal)   Ht 1.79 m (5' 10.47\")   Wt 101 kg (222 lb)   SpO2 96%   BMI 31.43 kg/m²     Physical Exam  General: Alert and oriented, No apparent distress.  Neck: Supple. No cervical or supraclavicular lymphadenopathy noted. Thyroid not enlarged.  Lungs: Clear to auscultation bilaterally without any wheezing, crepitations.  Cardiovascular: Regular rate and rhythm. No murmurs, rubs or gallops.  Abdomen: Bowel sound +, soft, non tender, no rebound or guarding, no palpable organomegaly  Extremities: No edema.      Assessment and Plan    1. Snoring  2. Apnea  STOPBANG score 5. We can order HST with [facility] now. We will see if he can get it done before sleep appointment.  - Overnight Home Sleep Study; Future    3. Essential hypertension  - stable. Continue losartan.  Monitor at home monthly to make sure it's at target in between visits.    4. Pure hypercholesterolemia  Tolerating increasing atorvastatin to 40 mg daily.  LDL is slightly at target now. Repeat labs  - Comp Metabolic Panel; Future  - Lipid Profile; Future    5. IFG (impaired fasting glucose)  - HEMOGLOBIN A1C; Future    6. Hypothyroidism due to Hashimoto's thyroiditis  - TSH WITH REFLEX TO FT4; Future  There was a problem with mail order and he was not able to get med for >1 month.  Resume and get labs in 6 weeks.      Return in about 1 year (around 12/27/2024) for Annual wellness visit.         Signed by: Kala Monzon M.D.    "

## 2024-01-02 DIAGNOSIS — G47.00 INSOMNIA, UNSPECIFIED TYPE: ICD-10-CM

## 2024-01-02 DIAGNOSIS — R76.8 ANTI-TPO ANTIBODIES PRESENT: ICD-10-CM

## 2024-01-02 RX ORDER — TRAZODONE HYDROCHLORIDE 50 MG/1
50-100 TABLET ORAL
Qty: 90 TABLET | Refills: 3 | Status: SHIPPED | OUTPATIENT
Start: 2024-01-02 | End: 2024-03-26

## 2024-01-02 NOTE — TELEPHONE ENCOUNTER
Received request via: Pharmacy    Was the patient seen in the last year in this department? Yes  12/27/2023  Does the patient have an active prescription (recently filled or refills available) for medication(s) requested? No    Does the patient have retirement Plus and need 100 day supply (blood pressure, diabetes and cholesterol meds only)? Yes, quantity updated to 100 days

## 2024-01-03 RX ORDER — LEVOTHYROXINE SODIUM 0.03 MG/1
25 TABLET ORAL
Qty: 90 TABLET | Refills: 3 | Status: SHIPPED | OUTPATIENT
Start: 2024-01-03

## 2024-01-03 NOTE — TELEPHONE ENCOUNTER
Received request via: Pharmacy    Was the patient seen in the last year in this department? Yes    Does the patient have an active prescription (recently filled or refills available) for medication(s) requested? yes    Does the patient have [facility] and need 100 day supply (blood pressure, diabetes and cholesterol meds only)? Patient does not have SCP    Requested Prescriptions     Pending Prescriptions Disp Refills    levothyroxine (SYNTHROID) 25 MCG Tab 90 Tablet 1     Sig: Take 1 Tablet by mouth every morning on an empty stomach.

## 2024-01-11 ENCOUNTER — OFFICE VISIT (OUTPATIENT)
Dept: SLEEP MEDICINE | Facility: MEDICAL CENTER | Age: 49
End: 2024-01-11
Attending: INTERNAL MEDICINE
Payer: COMMERCIAL

## 2024-01-11 VITALS
OXYGEN SATURATION: 97 % | DIASTOLIC BLOOD PRESSURE: 80 MMHG | BODY MASS INDEX: 30.29 KG/M2 | RESPIRATION RATE: 16 BRPM | HEIGHT: 70 IN | WEIGHT: 211.6 LBS | HEART RATE: 80 BPM | SYSTOLIC BLOOD PRESSURE: 140 MMHG

## 2024-01-11 DIAGNOSIS — R06.81 WITNESSED EPISODE OF APNEA: ICD-10-CM

## 2024-01-11 DIAGNOSIS — R06.83 SNORING: ICD-10-CM

## 2024-01-11 DIAGNOSIS — G47.19 EXCESSIVE DAYTIME SLEEPINESS: ICD-10-CM

## 2024-01-11 DIAGNOSIS — G47.30 BREATHING-RELATED SLEEP DISORDER: ICD-10-CM

## 2024-01-11 PROCEDURE — 3077F SYST BP >= 140 MM HG: CPT | Performed by: PREVENTIVE MEDICINE

## 2024-01-11 PROCEDURE — 99204 OFFICE O/P NEW MOD 45 MIN: CPT | Performed by: PREVENTIVE MEDICINE

## 2024-01-11 PROCEDURE — 99212 OFFICE O/P EST SF 10 MIN: CPT | Performed by: PREVENTIVE MEDICINE

## 2024-01-11 PROCEDURE — 3079F DIAST BP 80-89 MM HG: CPT | Performed by: PREVENTIVE MEDICINE

## 2024-01-11 ASSESSMENT — ENCOUNTER SYMPTOMS: SLEEP DISTURBANCE: 0

## 2024-01-11 ASSESSMENT — FIBROSIS 4 INDEX: FIB4 SCORE: 0.93
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
## 2024-03-05 ENCOUNTER — HOME STUDY (OUTPATIENT)
Dept: SLEEP MEDICINE | Facility: MEDICAL CENTER | Age: 49
End: 2024-03-05
Attending: PREVENTIVE MEDICINE
Payer: COMMERCIAL

## 2024-03-05 DIAGNOSIS — R06.81 WITNESSED EPISODE OF APNEA: ICD-10-CM

## 2024-03-05 DIAGNOSIS — R06.83 SNORING: ICD-10-CM

## 2024-03-05 DIAGNOSIS — G47.30 BREATHING-RELATED SLEEP DISORDER: ICD-10-CM

## 2024-03-05 DIAGNOSIS — G47.19 EXCESSIVE DAYTIME SLEEPINESS: ICD-10-CM

## 2024-03-05 PROCEDURE — 95800 SLP STDY UNATTENDED: CPT | Performed by: PREVENTIVE MEDICINE

## 2024-03-06 DIAGNOSIS — E78.5 HYPERLIPIDEMIA, UNSPECIFIED HYPERLIPIDEMIA TYPE: ICD-10-CM

## 2024-03-07 NOTE — TELEPHONE ENCOUNTER
Received request via: Pharmacy    Was the patient seen in the last year in this department? Yes    Does the patient have an active prescription (recently filled or refills available) for medication(s) requested? No    Ph[facility] Name[facility]    Does the patient have intermediate Plus and need 100 day supply (blood pressure, diabetes and cholesterol meds only)? Patient does not have SCP

## 2024-03-08 RX ORDER — ATORVASTATIN CALCIUM 40 MG/1
40 TABLET, FILM COATED ORAL DAILY
Qty: 90 TABLET | Refills: 3 | Status: SHIPPED | OUTPATIENT
Start: 2024-03-08

## 2024-03-25 DIAGNOSIS — G47.00 INSOMNIA, UNSPECIFIED TYPE: ICD-10-CM

## 2024-03-26 RX ORDER — TRAZODONE HYDROCHLORIDE 50 MG/1
50-100 TABLET ORAL
Qty: 180 TABLET | Refills: 3 | Status: SHIPPED | OUTPATIENT
Start: 2024-03-26

## 2024-03-26 NOTE — PROCEDURES
DIAGNOSTIC HOME SLEEP TEST (HST) REPORT WatchPAT      PATIENT ID:  NAME:  Danilo Sierra  MRN:               [id number]  YOB: 1975  DATE OF STUDY:03/05/2024       Impression:     This study shows evidence of:      1. Severe obstructive sleep apnea with PAT apnea hyponea index (pAHI 3%) of 34.3 per hour.  PAT respiratory disturbance index (pRDI) was 35.1 per hour. These findings are based on 7 channels recording of PAT signal with sleep staging, heart rate, pulse oximetry, actigraphy, body position, snoring and respiratory movement.     2. Oxygenation O2 Sat. mean O2 sat was 92%,  maite was 75%,  and maximum O2 at 99 %. O2 sat was at or  below 88% for 22.0 min of evaluation time. Oxygen Desaturation (4%) Index was elevated at 26.5/hr.  PAT apnea hyponea index (pAHI 4%) of 28.0 per hour.  AVG HR was 59 BPM.      TECHNICAL DESCRIPTION: Patient underwent home sleep apnea testing with peripheral arterial tone signal (WatchPAT™). This is a Type IV portable monitor and device. Monitoring was done with 7 channels recording of PAT signal with sleep staging, heart rate, pulse oximetry, actigraphy, body position, snoring and respiratory movement. Prior to using the device, the patient received verbal and written instructions for its application and was provided with the help desk phone number for additional telephonic instruction with 24-hour availability of qualified personnel to answer questions.    AHI  vs RDI:  The pRDI is calculated in a very similar way as the pAHI but an additional type of respiratory events named RERA are also counted. RERA  is the abbreviation for respiratory effort related arousal and is essentially a very short arousal of a few seconds that follows partial occlusion of the airway.  The normal range of the RDI score is also less than 5/hour.    General sleep summary: . Total recording time is 7 hours and 17 minutes and approximate sleep time is 6 hours and 33 minutes.  The patient spent 249.5 minutes in the supine position and 143.5 minutes in the nonsupine position.  Supine AHI (3%) 37.9.    Recommendations:    CPAP titration study vs Auto CPAP trial.    Patient will do well on a ResMed APAP with initial settings from min 6 to max 16 cm H20. Careful mask fit and heated humidification are required part of this prescription. This patient will need to meet all insurance compliance requirements.     In general patients with sleep apnea are advised to avoid alcohol and sedatives and to not operate a motor vehicle while drowsy. In some cases alternative treatment options may prove effective in resolving sleep apnea in these options include upper airway surgery, the use of a dental orthotic or weight loss and positional therapy. Clinical correlation is required.

## 2024-03-27 ENCOUNTER — OFFICE VISIT (OUTPATIENT)
Dept: SLEEP MEDICINE | Facility: MEDICAL CENTER | Age: 49
End: 2024-03-27
Attending: PREVENTIVE MEDICINE
Payer: COMMERCIAL

## 2024-03-27 VITALS
BODY MASS INDEX: 27.3 KG/M2 | RESPIRATION RATE: 16 BRPM | HEART RATE: 75 BPM | WEIGHT: 190.7 LBS | OXYGEN SATURATION: 97 % | DIASTOLIC BLOOD PRESSURE: 68 MMHG | SYSTOLIC BLOOD PRESSURE: 120 MMHG | HEIGHT: 70 IN

## 2024-03-27 DIAGNOSIS — G47.33 OSA (OBSTRUCTIVE SLEEP APNEA): ICD-10-CM

## 2024-03-27 DIAGNOSIS — G47.34 NOCTURNAL OXYGEN DESATURATION: ICD-10-CM

## 2024-03-27 PROCEDURE — 3078F DIAST BP <80 MM HG: CPT | Performed by: PREVENTIVE MEDICINE

## 2024-03-27 PROCEDURE — 99214 OFFICE O/P EST MOD 30 MIN: CPT | Performed by: PREVENTIVE MEDICINE

## 2024-03-27 PROCEDURE — 3074F SYST BP LT 130 MM HG: CPT | Performed by: PREVENTIVE MEDICINE

## 2024-03-27 PROCEDURE — 99212 OFFICE O/P EST SF 10 MIN: CPT | Performed by: PREVENTIVE MEDICINE

## 2024-03-27 ASSESSMENT — FIBROSIS 4 INDEX: FIB4 SCORE: 0.95

## 2024-03-27 NOTE — PROGRESS NOTES
"CHIEF COMPLIANT: \"How did I do on my sleep study?\"   LAST SEEN:  Dr. Monroe on 1/11/2024  HISTORY OF PRESENT ILLNESS:  Danilo Sierra is a 49 y.o.male who is here for sleep study results.   The patient reports snoring, apnea, EDS,  and  he will choke and gasp and he has fallen asleep while driving.  The patient goes to bed at 9:30 pm and wakes up at 5 am. It usually takes the patient approximately 15 mins to fall asleep with trazodone. The patient does not feel refreshed and does nap during the day  ( between 2:30- 330). The patient has never used tobacco.  . Pt rarely uses cannabis about 2 times a year.  This pt does drink 1-2 alcoholic beverages per month and has 2 caffeinated beverages daily.     Sleep study results:   TYPE:  [facility] HST  DATE: 3/5/2024  Diagnostic:AHI:  34.3  Diagnostic  Oxygen Kieran: 75 % with 22.0 mins at or under 88%       Significant comorbidities and modifying factors: see below     PAST MEDICAL HISTORY:  Past Medical History:   Diagnosis Date    Allergic rhinitis 01/09/2015    Apnea, sleep     Chickenpox     Daytime sleepiness     Essential hypertension 06/14/2023    Gasping for breath     GERD (gastroesophageal reflux disease)     Heartburn     Hyperlipidemia     borderline, no meds    Influenza     Insomnia 01/13/2012    Mild depression 07/06/2021    Snoring     Subclinical hypothyroidism 05/11/2022    Wears glasses       PROBLEM LIST:  Patient Active Problem List    Diagnosis Date Noted    Essential hypertension 06/14/2023    Family history of colon cancer 09/09/2021    Hypothyroidism due to Hashimoto's thyroiditis 09/09/2021    At risk for sleep apnea 07/06/2021    Mild depression 07/06/2021    Restless leg 07/06/2021    Low testosterone in male 07/14/2020    Family history of diabetes mellitus (DM) 07/13/2020    Prediabetes 07/13/2020    Pure hypercholesterolemia 01/08/2016    Gastroesophageal reflux disease without esophagitis 01/08/2016    Allergic rhinitis 01/09/2015    " Insomnia 01/13/2012     PAST SOCIAL HISTORY:  Past Surgical History:   Procedure Laterality Date    DENTAL EXTRACTION(S)      [place] [facility]     PAST FAMILY HISTORY:  Family History   Problem Relation Age of Onset    Hyperlipidemia Mother     Kidney Disease Mother     Cancer Maternal Grandmother         Lung cancer. (Smoker)    Cancer Maternal Grandfather         Colon cancer    Diabetes Maternal Uncle      SOCIAL HISTORY:  Social History     Socioeconomic History    Marital status: Single     Spouse name: Not on file    Number of children: Not on file    Years of education: Not on file    Highest education level: Some college, no degree   Occupational History    Occupation: [employer]     Employer: [employer]   Tobacco Use    Smoking status: Never    Smokeless tobacco: Never   Vaping Use    Vaping Use: Never used   Substance and Sexual Activity    Alcohol use: Not Currently     Comment: Socially    Drug use: No    Sexual activity: Yes     Partners: Female     Comment: Living with girlfriend   Other Topics Concern    Not on file   Social History Narrative    Not on file     Social Determinants of Health     Financial Resource Strain: Low Risk  (5/10/2022)    Overall Financial Resource Strain (CARDIA)     Difficulty of Paying Living Expenses: Not very hard   Food Insecurity: No Food Insecurity (5/10/2022)    Hunger Vital Sign     Worried About Running Out of Food in the Last Year: Never true     Ran Out of Food in the Last Year: Never true   Transportation Needs: No Transportation Needs (5/10/2022)    PRAPARE - Transportation     Lack of Transportation (Medical): No     Lack of Transportation (Non-Medical): No   Physical Activity: Sufficiently Active (5/10/2022)    Exercise Vital Sign     Days of Exercise per Week: 4 days     Minutes of Exercise per Session: 60 min   Stress: Stress Concern Present (5/10/2022)    [employer] - Occupational Stress Questionnaire     Feeling of Stress : Rather  "much   Social Connections: Moderately Isolated (5/10/2022)    Social Connection and Isolation Panel [NHANES]     Frequency of Communication with Friends and Family: Once a week     Frequency of Social Gatherings with Friends and Family: More than three times a week     Attends [employer] Services: Never     Active Member of Clubs or Organizations: No     Attends Club or Organization Meetings: Never     Marital Status: Living with partner   Intimate Partner Violence: Not on file   Housing Stability: Low Risk  (5/10/2022)    Housing Stability Vital Sign     Unable to Pay for Housing in the Last Year: No     Number of Places Lived in the Last Year: 1     Unstable Housing in the Last Year: No     ALLERGIES: Patient has no known allergies.  MEDICATIONS:  Current Outpatient Medications   Medication Sig Dispense Refill    traZODone (DESYREL) 50 MG Tab TAKE 1 TO 2 TABLETS BY MOUTH AT BEDTIME 180 Tablet 3    atorvastatin (LIPITOR) 40 MG Tab Take 1 Tablet by mouth every day. 90 Tablet 3    levothyroxine (SYNTHROID) 25 MCG Tab Take 1 Tablet by mouth every morning on an empty stomach. 90 Tablet 3    cyclobenzaprine (FLEXERIL) 10 mg Tab TAKE 1 TABLET BY MOUTH 3 TIMES A DAY AS NEEDED FOR MUSCLE SPASMS OR MODERATE PAIN. 90 Tablet 0    gabapentin (NEURONTIN) 300 MG Cap Take 2 Capsules by mouth at bedtime. 90 Capsule 2    losartan (COZAAR) 100 MG Tab Take 1 Tablet by mouth every day. 90 Tablet 3    omeprazole (PRILOSEC) 40 MG delayed-release capsule Take 1 Capsule by mouth every day. 90 Capsule 3    Loratadine (CLARITIN PO) Take  by mouth.      testosterone cypionate (DEPO-TESTOSTERONE) 200 MG/ML Solution injection testosterone cypionate 200 mg/mL intramuscular oil   INJECT 1 ML EVERY 2 WEEKS BY INTRAMUSCULAR ROUTE (Patient not taking: Reported on 3/27/2024)       No current facility-administered medications for this visit.    \"CURRENT RX\"    REVIEW OF SYSTEMS:  Constitutional: Denies weight loss, denies chronic daytime fatigue.  See " "HPI      PHYSICAL EXAM/VITALS:  /68 (BP Location: Left arm, Patient Position: Sitting, BP Cuff Size: Large adult)   Pulse 75   Resp 16   Ht 1.778 m (5' 10\")   Wt 86.5 kg (190 lb 11.2 oz)   SpO2 97%   BMI 27.36 kg/m²   Appearance: Well-nourished, well-developed,  looks stated age, no acute distress  Eyes:   EOMI  ENMT:  WNL  Neck: Supple, trachea midline  Respiratory effort:  No intercostal retractions or use of accessory muscles  Musculoskeletal:  Grossly normal; gait and station normal  Neurologic:  oriented to person, time, place, and purpose; judgement intact  Psychiatric:  No depression, anxiety, agitation      MEDICAL DECISION MAKING:  The medical record was reviewed as it pertains to this referral. This includes records from primary care,consultants notes, referral request, hospital records, labs and imaging. Any available diagnostic and titration nocturnal polysomnograms, home sleep apnea tests, continuous nocturnal oximetry results, multiple sleep latency tests, and recent compliance reports were reviewed with the patient.    ASSESSMENT/PLAN:  Danilo Sierra is a 49 y.o.male was diagnosed with severe MADISYN.  He will do well using a ResMed APAP with min pressure of 5 and max pressure of 15 cm H2O.  He was Fit with an Avril FF cushion size S, M or L   Patient will need to undergo an oximetry study once he is compliant on PAP therapy.        DIAGNOSES :        1. MADISYN (obstructive sleep apnea)  - DME CPAP    2. Nocturnal oxygen desaturation  - DME CPAP      MEETING INSURANCE COMPLIANCE REQUIREMENTS and MISC tips:    The patient has 90 days in order to be deemed compliant by the insurance company.  The 90-day starts the day that the patient receives the machine and supplies from the DME.  It is during this period of time that the patient must demonstrate a consistent use of pap (greater than 4 hours/day for a minimum of 23 days out of 30).  The patient is aware that  PAP usage ( time) will be " added up--  together over a 24-hour period.  This simply means that the patient does not have to use the machine for 4 hours in a row and the patient does not have to be asleep for the minutes to count towards the required 4 hours.     It is recommended to the patient that the patient begin Pap therapy by first just wearing the mask and headgear loosely applied to the face for 20 minutes a day for the first 2-3 days.  Then  the patient may begin using the mask and headgear with the machine to get a feel for using the mask with a good seal This should be accomplished by using the entire PAP set up with the machine on for 20 to 30 minute(the ramp time) while the patient is awake.  Try this for 2 days.  These usage exercises will  allow the face and brain to get accustomed to mask, headgear and air pressures.  Now the patient can begin to use the Pap therapy for sleep.     Also  the patient is instructed to keep the machine located slightly ( 6-12 ins)  below the level of the mattress.  This allows for proper humidification of the air before it reaches nasal passages and prevents inhaling water droplets.      Cleaning the mask, headgear, tubing, water reservoir is to be done using hot water and a gentle detergent once a week.        PAP THERAPY  EQUIPMENT AND SUPPLIES SCHEDULE    Mask cushion every month  Nasal pillows 2 times per month  Mask every 6 months  Head gear every 6 months  Tubing every 3 months  Ultra-fine filters 2 times per month  Foam filter every 6 months  Humidifier chamber every 6 months     The risks of untreated sleep apnea were discussed with the patient at length. Patients with MADISYN are at increased risk of cardiovascular disease including coronary artery disease, systemic arterial hypertension, pulmonary arterial hypertension, cardiac arrhythmias, and stroke. MADISYN patients have an increased risk of motor vehicle accidents, type 2 diabetes, chronic kidney disease, and non-alcoholic liver disease. The  patient was advised to avoid driving a motor vehicle when drowsy.  Have advised the patient to follow up with the appropriate healthcare practitioners for all other medical problems and issues.    RETURN TO CLINIC: Return in about 8 weeks (around 5/22/2024) for Compliance check.    My total time spent caring for the patient on the day of the encounter was 40 minutes. This includes time spent on a thorough chart review including other physician notes, all sleep studies, as well as critical labs and pulmonary and cardiac studies.  Additionally, it includes a thorough discussion of good sleep hygiene and stimulus control, as well as  the need for consistency in terms of sleep preparation and practice.    Please note that this dictation was created using voice recognition software.  I have made every reasonable attempt to correct obvious errors, I expect that there are errors of grammar and possibly content that I did not discover before finalizing this note.

## 2024-05-29 ENCOUNTER — HOSPITAL ENCOUNTER (OUTPATIENT)
Dept: LAB | Facility: MEDICAL CENTER | Age: 49
End: 2024-05-29
Attending: PHYSICIAN ASSISTANT
Payer: COMMERCIAL

## 2024-05-29 ENCOUNTER — HOSPITAL ENCOUNTER (OUTPATIENT)
Dept: LAB | Facility: MEDICAL CENTER | Age: 49
End: 2024-05-29
Attending: INTERNAL MEDICINE
Payer: COMMERCIAL

## 2024-05-29 DIAGNOSIS — E78.00 PURE HYPERCHOLESTEROLEMIA: ICD-10-CM

## 2024-05-29 DIAGNOSIS — I10 ESSENTIAL HYPERTENSION: ICD-10-CM

## 2024-05-29 DIAGNOSIS — E03.8 HYPOTHYROIDISM DUE TO HASHIMOTO'S THYROIDITIS: ICD-10-CM

## 2024-05-29 DIAGNOSIS — E06.3 HYPOTHYROIDISM DUE TO HASHIMOTO'S THYROIDITIS: ICD-10-CM

## 2024-05-29 DIAGNOSIS — R73.01 IFG (IMPAIRED FASTING GLUCOSE): ICD-10-CM

## 2024-05-29 LAB
BASOPHILS # BLD AUTO: 0.8 % (ref 0–1.8)
BASOPHILS # BLD AUTO: 1.1 % (ref 0–1.8)
BASOPHILS # BLD: 0.05 K/UL (ref 0–0.12)
BASOPHILS # BLD: 0.06 K/UL (ref 0–0.12)
EOSINOPHIL # BLD AUTO: 0.2 K/UL (ref 0–0.51)
EOSINOPHIL # BLD AUTO: 0.23 K/UL (ref 0–0.51)
EOSINOPHIL NFR BLD: 3.5 % (ref 0–6.9)
EOSINOPHIL NFR BLD: 3.8 % (ref 0–6.9)
ERYTHROCYTE [DISTWIDTH] IN BLOOD BY AUTOMATED COUNT: 47.4 FL (ref 35.9–50)
ERYTHROCYTE [DISTWIDTH] IN BLOOD BY AUTOMATED COUNT: 47.5 FL (ref 35.9–50)
EST. AVERAGE GLUCOSE BLD GHB EST-MCNC: 105 MG/DL
HBA1C MFR BLD: 5.3 % (ref 4–5.6)
HCT VFR BLD AUTO: 46.3 % (ref 42–52)
HCT VFR BLD AUTO: 47.3 % (ref 42–52)
HGB BLD-MCNC: 14.9 G/DL (ref 14–18)
HGB BLD-MCNC: 15 G/DL (ref 14–18)
IMM GRANULOCYTES # BLD AUTO: 0.02 K/UL (ref 0–0.11)
IMM GRANULOCYTES # BLD AUTO: 0.02 K/UL (ref 0–0.11)
IMM GRANULOCYTES NFR BLD AUTO: 0.3 % (ref 0–0.9)
IMM GRANULOCYTES NFR BLD AUTO: 0.4 % (ref 0–0.9)
LYMPHOCYTES # BLD AUTO: 1.36 K/UL (ref 1–4.8)
LYMPHOCYTES # BLD AUTO: 1.47 K/UL (ref 1–4.8)
LYMPHOCYTES NFR BLD: 24 % (ref 22–41)
LYMPHOCYTES NFR BLD: 24.1 % (ref 22–41)
MCH RBC QN AUTO: 25.3 PG (ref 27–33)
MCH RBC QN AUTO: 25.6 PG (ref 27–33)
MCHC RBC AUTO-ENTMCNC: 31.7 G/DL (ref 32.3–36.5)
MCHC RBC AUTO-ENTMCNC: 32.2 G/DL (ref 32.3–36.5)
MCV RBC AUTO: 79.7 FL (ref 81.4–97.8)
MCV RBC AUTO: 79.8 FL (ref 81.4–97.8)
MONOCYTES # BLD AUTO: 0.58 K/UL (ref 0–0.85)
MONOCYTES # BLD AUTO: 0.63 K/UL (ref 0–0.85)
MONOCYTES NFR BLD AUTO: 10.2 % (ref 0–13.4)
MONOCYTES NFR BLD AUTO: 10.3 % (ref 0–13.4)
NEUTROPHILS # BLD AUTO: 3.44 K/UL (ref 1.82–7.42)
NEUTROPHILS # BLD AUTO: 3.71 K/UL (ref 1.82–7.42)
NEUTROPHILS NFR BLD: 60.7 % (ref 44–72)
NEUTROPHILS NFR BLD: 60.8 % (ref 44–72)
NRBC # BLD AUTO: 0 K/UL
NRBC # BLD AUTO: 0 K/UL
NRBC BLD-RTO: 0 /100 WBC (ref 0–0.2)
NRBC BLD-RTO: 0 /100 WBC (ref 0–0.2)
PLATELET # BLD AUTO: 314 K/UL (ref 164–446)
PLATELET # BLD AUTO: 328 K/UL (ref 164–446)
PMV BLD AUTO: 9.3 FL (ref 9–12.9)
PMV BLD AUTO: 9.7 FL (ref 9–12.9)
PSA SERPL-MCNC: 0.62 NG/ML (ref 0–4)
RBC # BLD AUTO: 5.81 M/UL (ref 4.7–6.1)
RBC # BLD AUTO: 5.93 M/UL (ref 4.7–6.1)
TSH SERPL DL<=0.005 MIU/L-ACNC: 2.46 UIU/ML (ref 0.38–5.33)
WBC # BLD AUTO: 5.7 K/UL (ref 4.8–10.8)
WBC # BLD AUTO: 6.1 K/UL (ref 4.8–10.8)

## 2024-05-30 LAB
ALBUMIN SERPL BCP-MCNC: 4.5 G/DL (ref 3.2–4.9)
ALBUMIN/GLOB SERPL: 1.7 G/DL
ALP SERPL-CCNC: 74 U/L (ref 30–99)
ALT SERPL-CCNC: 135 U/L (ref 2–50)
ANION GAP SERPL CALC-SCNC: 12 MMOL/L (ref 7–16)
AST SERPL-CCNC: 98 U/L (ref 12–45)
BILIRUB SERPL-MCNC: 1 MG/DL (ref 0.1–1.5)
BUN SERPL-MCNC: 23 MG/DL (ref 8–22)
CALCIUM ALBUM COR SERPL-MCNC: 9.2 MG/DL (ref 8.5–10.5)
CALCIUM SERPL-MCNC: 9.6 MG/DL (ref 8.5–10.5)
CHLORIDE SERPL-SCNC: 100 MMOL/L (ref 96–112)
CHOLEST SERPL-MCNC: 154 MG/DL (ref 100–199)
CO2 SERPL-SCNC: 26 MMOL/L (ref 20–33)
CREAT SERPL-MCNC: 1.22 MG/DL (ref 0.5–1.4)
ESTRADIOL SERPL-MCNC: 12.3 PG/ML
GFR SERPLBLD CREATININE-BSD FMLA CKD-EPI: 73 ML/MIN/1.73 M 2
GLOBULIN SER CALC-MCNC: 2.7 G/DL (ref 1.9–3.5)
GLUCOSE SERPL-MCNC: 90 MG/DL (ref 65–99)
HDLC SERPL-MCNC: 36 MG/DL
LDLC SERPL CALC-MCNC: 97 MG/DL
POTASSIUM SERPL-SCNC: 4.9 MMOL/L (ref 3.6–5.5)
PROT SERPL-MCNC: 7.2 G/DL (ref 6–8.2)
SODIUM SERPL-SCNC: 138 MMOL/L (ref 135–145)
TESTOST SERPL-MCNC: 295 NG/DL (ref 175–781)
TRIGL SERPL-MCNC: 103 MG/DL (ref 0–149)

## 2024-05-31 DIAGNOSIS — R74.8 ELEVATED LIVER ENZYMES: ICD-10-CM

## 2024-06-12 DIAGNOSIS — I10 ESSENTIAL HYPERTENSION: ICD-10-CM

## 2024-06-12 RX ORDER — LOSARTAN POTASSIUM 100 MG/1
100 TABLET ORAL DAILY
Qty: 90 TABLET | Refills: 3 | Status: SHIPPED | OUTPATIENT
Start: 2024-06-12

## 2024-06-12 NOTE — TELEPHONE ENCOUNTER
Received request via: Pharmacy    Was the patient seen in the last year in this department? Yes    Does the patient have an active prescription (recently filled or refills available) for medication(s) requested? No    Pharmacy Name: [facility] - [place], [place] - [facility] [address]     Does the patient have skilled nursing Plus and need 100 day supply (blood pressure, diabetes and cholesterol meds only)? Yes, quantity updated to 100 days

## 2024-06-28 NOTE — ASSESSMENT & PLAN NOTE
Iron def anemia  Denies overt bleeding  8/25/21  H&H 11.7L  &40.2 MCV 75.8  WBC 5.4  Iron 31 L(normal value )  TIBC 456 H (normal value 250-425)  % saturation 7% L (normal value 20-48%)  Ferritin 13 (normal value )  Heartburn on omeprazole  Occasionally takes NSAID for headache  Family hx of colon cancer (maternal grandfather)   8/25/21Urinalysis is normal         Patient is calling, and would like his albuterol sent to [facility], instead of [facility]    Advised [facility].    Ani RAMIREZN RN  Triage Nurse  [facility]

## 2024-07-03 ENCOUNTER — HOSPITAL ENCOUNTER (OUTPATIENT)
Dept: LAB | Facility: MEDICAL CENTER | Age: 49
End: 2024-07-03
Attending: INTERNAL MEDICINE
Payer: COMMERCIAL

## 2024-07-03 DIAGNOSIS — R74.8 ELEVATED LIVER ENZYMES: ICD-10-CM

## 2024-07-03 LAB
ALBUMIN SERPL BCP-MCNC: 4.4 G/DL (ref 3.2–4.9)
ALP SERPL-CCNC: 81 U/L (ref 30–99)
ALT SERPL-CCNC: 52 U/L (ref 2–50)
AST SERPL-CCNC: 64 U/L (ref 12–45)
BILIRUB CONJ SERPL-MCNC: 0.2 MG/DL (ref 0.1–0.5)
BILIRUB INDIRECT SERPL-MCNC: 0.9 MG/DL (ref 0–1)
BILIRUB SERPL-MCNC: 1.1 MG/DL (ref 0.1–1.5)
PROT SERPL-MCNC: 7.1 G/DL (ref 6–8.2)

## 2024-07-03 PROCEDURE — 80076 HEPATIC FUNCTION PANEL: CPT

## 2024-07-03 PROCEDURE — 36415 COLL VENOUS BLD VENIPUNCTURE: CPT

## 2024-07-12 ENCOUNTER — APPOINTMENT (OUTPATIENT)
Dept: MEDICAL GROUP | Facility: MEDICAL CENTER | Age: 49
End: 2024-07-12
Payer: COMMERCIAL

## 2024-07-31 ENCOUNTER — OFFICE VISIT (OUTPATIENT)
Dept: MEDICAL GROUP | Facility: MEDICAL CENTER | Age: 49
End: 2024-07-31
Payer: COMMERCIAL

## 2024-07-31 VITALS
SYSTOLIC BLOOD PRESSURE: 132 MMHG | DIASTOLIC BLOOD PRESSURE: 68 MMHG | BODY MASS INDEX: 31.9 KG/M2 | WEIGHT: 215.4 LBS | HEART RATE: 71 BPM | OXYGEN SATURATION: 96 % | TEMPERATURE: 98.2 F | HEIGHT: 69 IN

## 2024-07-31 DIAGNOSIS — M25.511 CHRONIC RIGHT SHOULDER PAIN: ICD-10-CM

## 2024-07-31 DIAGNOSIS — A09 TRAVELER'S DIARRHEA: ICD-10-CM

## 2024-07-31 DIAGNOSIS — G89.29 CHRONIC RIGHT SHOULDER PAIN: ICD-10-CM

## 2024-07-31 RX ORDER — MELOXICAM 15 MG/1
15 TABLET ORAL DAILY
Qty: 14 TABLET | Refills: 0 | Status: SHIPPED | OUTPATIENT
Start: 2024-07-31

## 2024-07-31 RX ORDER — AZITHROMYCIN 500 MG/1
500 TABLET, FILM COATED ORAL DAILY
Qty: 3 TABLET | Refills: 0 | Status: SHIPPED | OUTPATIENT
Start: 2024-07-31 | End: 2024-08-03

## 2024-07-31 ASSESSMENT — PATIENT HEALTH QUESTIONNAIRE - PHQ9: CLINICAL INTERPRETATION OF PHQ2 SCORE: 0

## 2024-07-31 ASSESSMENT — FIBROSIS 4 INDEX: FIB4 SCORE: 1.38

## 2024-08-09 ENCOUNTER — HOSPITAL ENCOUNTER (OUTPATIENT)
Dept: RADIOLOGY | Facility: MEDICAL CENTER | Age: 49
End: 2024-08-09
Attending: INTERNAL MEDICINE
Payer: COMMERCIAL

## 2024-08-09 DIAGNOSIS — M25.511 CHRONIC RIGHT SHOULDER PAIN: ICD-10-CM

## 2024-08-09 DIAGNOSIS — G89.29 CHRONIC RIGHT SHOULDER PAIN: ICD-10-CM

## 2024-08-09 PROCEDURE — 73030 X-RAY EXAM OF SHOULDER: CPT | Mod: RT

## 2024-08-11 ENCOUNTER — OFFICE VISIT (OUTPATIENT)
Dept: URGENT CARE | Facility: PHYSICIAN GROUP | Age: 49
End: 2024-08-11
Payer: COMMERCIAL

## 2024-08-11 VITALS
OXYGEN SATURATION: 98 % | RESPIRATION RATE: 12 BRPM | HEIGHT: 69 IN | DIASTOLIC BLOOD PRESSURE: 82 MMHG | WEIGHT: 213.63 LBS | HEART RATE: 60 BPM | SYSTOLIC BLOOD PRESSURE: 140 MMHG | TEMPERATURE: 98 F | BODY MASS INDEX: 31.64 KG/M2

## 2024-08-11 DIAGNOSIS — M54.50 LUMBAR PAIN: ICD-10-CM

## 2024-08-11 DIAGNOSIS — S39.012A STRAIN OF LUMBAR PARASPINOUS MUSCLE, INITIAL ENCOUNTER: ICD-10-CM

## 2024-08-11 PROCEDURE — 3077F SYST BP >= 140 MM HG: CPT | Performed by: PHYSICIAN ASSISTANT

## 2024-08-11 PROCEDURE — 3079F DIAST BP 80-89 MM HG: CPT | Performed by: PHYSICIAN ASSISTANT

## 2024-08-11 PROCEDURE — 99213 OFFICE O/P EST LOW 20 MIN: CPT | Mod: 25 | Performed by: PHYSICIAN ASSISTANT

## 2024-08-11 RX ORDER — METHYLPREDNISOLONE 4 MG
4 TABLET, DOSE PACK ORAL DAILY
Qty: 21 TABLET | Refills: 0 | Status: SHIPPED | OUTPATIENT
Start: 2024-08-11

## 2024-08-11 RX ORDER — KETOROLAC TROMETHAMINE 30 MG/ML
15 INJECTION, SOLUTION INTRAMUSCULAR; INTRAVENOUS ONCE
Status: COMPLETED | OUTPATIENT
Start: 2024-08-11 | End: 2024-08-11

## 2024-08-11 RX ADMIN — KETOROLAC TROMETHAMINE 15 MG: 30 INJECTION, SOLUTION INTRAMUSCULAR; INTRAVENOUS at 10:26

## 2024-08-11 ASSESSMENT — ENCOUNTER SYMPTOMS
TINGLING: 0
BACK PAIN: 1
GASTROINTESTINAL NEGATIVE: 1
WEAKNESS: 0

## 2024-08-11 ASSESSMENT — FIBROSIS 4 INDEX: FIB4 SCORE: 1.38

## 2024-08-11 NOTE — PROGRESS NOTES
"  Subjective:     Danilo Sierra  is a 49 y.o. male who presents for Back Pain (X1 day. He woke up yesterday w/ lower back pain. Saw his PCP and found a disc protrusion in his MRI 01/2023.  Takes gabapentin and cyclobenzaprine prn to help, but wasn't helping when he took it yesterday. )       He presents today for lumbar pain that began 2 days ago, no specific trauma or injury associate with symptom onset.  States that all trunk movements do worsen his symptoms, they do improve when he is able to lay supine.  He denies any numbness/tingling or weakness into the lower extremities.  No loss of bowel or bladder function, no numbness/tingling in the groin.  He is not experiencing any urinary tract symptoms at this time.  No nausea or vomiting, no dumping, no diarrhea.  Has taken gabapentin, Flexeril for symptoms, did take ibuprofen last night.  Previous history of bulging disc seen on MRI in January 2023.       Review of Systems   Gastrointestinal: Negative.    Genitourinary: Negative.    Musculoskeletal:  Positive for back pain.   Neurological:  Negative for tingling and weakness.      No Known Allergies  Past Medical History:   Diagnosis Date    Allergic rhinitis 01/09/2015    Apnea, sleep     Chickenpox     Daytime sleepiness     Essential hypertension 06/14/2023    Gasping for breath     GERD (gastroesophageal reflux disease)     Heartburn     Hyperlipidemia     borderline, no meds    Influenza     Insomnia 01/13/2012    Mild depression 07/06/2021    Snoring     Subclinical hypothyroidism 05/11/2022    Wears glasses         Objective:   BP (!) 140/82 (BP Location: Left arm, Patient Position: Sitting, BP Cuff Size: Adult)   Pulse 60   Temp 36.7 °C (98 °F) (Temporal)   Resp 12   Ht 1.753 m (5' 9\") Comment: Pt reported  Wt 96.9 kg (213 lb 10 oz)   SpO2 98%   BMI 31.55 kg/m²   Physical Exam  Vitals and nursing note reviewed.   Constitutional:       General: He is not in acute distress.     Appearance: " He is not ill-appearing or toxic-appearing.   HENT:      Head: Normocephalic.      Nose: No rhinorrhea.   Eyes:      General: No scleral icterus.     Conjunctiva/sclera: Conjunctivae normal.   Pulmonary:      Effort: Pulmonary effort is normal. No respiratory distress.      Breath sounds: No stridor.   Musculoskeletal:      Cervical back: Neck supple.      Comments: Examination lumbar spine does reveal reproducible tenderness over the midline and paraspinal musculature of the lumbosacral region.  Patient does sit in a fixed upright position with limited trunk movement as it does worsen his pain.  Lower extremity myotome dermatomes testing normal   Neurological:      Mental Status: He is alert and oriented to person, place, and time.   Psychiatric:         Mood and Affect: Mood normal.         Behavior: Behavior normal.         Thought Content: Thought content normal.         Judgment: Judgment normal.             Diagnostic testing: None    Assessment/Plan:     Encounter Diagnoses   Name Primary?    Lumbar pain     Strain of lumbar paraspinous muscle, initial encounter           Plan for care for today's complaint includes providing the patient 15 mg Toradol injection in office today.  Last dose of ibuprofen was last night roughly 5 PM and has not taken meloxicam over the last several days.  Informed patient that they will need to avoid NSAID use and meloxicam use over the next 24 hours due to the Toradol injection provided in office today.  Trial Medrol Dosepak and continue gabapentin and Flexeril as previously prescribed.  No evidence of lumbar radiculopathy or cauda equina syndrome.  Vital signs were stable during today's office visit, patient was overall well-appearing. Continue to monitor symptoms and return to urgent care or follow-up with primary care provider if symptoms remain ongoing.  Follow-up in the emergency department if symptoms become severe, ER precautions discussed in office today..  Prescription  for Medrol Dosepak provided.    See AVS Instructions below for written guidance provided to patient on after-visit management and care in addition to our verbal discussion during the visit.    Please note that this dictation was created using voice recognition software. I have attempted to correct all errors, but there may be sound-alike, spelling, grammar and possibly content errors that I did not discover before finalizing the note.    Chad Perez PA-C

## 2024-08-22 ENCOUNTER — OFFICE VISIT (OUTPATIENT)
Dept: PHYSICAL MEDICINE AND REHAB | Facility: MEDICAL CENTER | Age: 49
End: 2024-08-22
Payer: COMMERCIAL

## 2024-08-22 VITALS
TEMPERATURE: 99.2 F | HEIGHT: 69 IN | OXYGEN SATURATION: 98 % | BODY MASS INDEX: 31.31 KG/M2 | HEART RATE: 78 BPM | WEIGHT: 211.42 LBS

## 2024-08-22 DIAGNOSIS — M54.41 CHRONIC BILATERAL LOW BACK PAIN WITH RIGHT-SIDED SCIATICA: ICD-10-CM

## 2024-08-22 DIAGNOSIS — M54.16 RIGHT LUMBAR RADICULOPATHY: Primary | ICD-10-CM

## 2024-08-22 DIAGNOSIS — G89.29 CHRONIC BILATERAL LOW BACK PAIN WITH RIGHT-SIDED SCIATICA: ICD-10-CM

## 2024-08-22 DIAGNOSIS — M79.18 GLUTEAL PAIN: ICD-10-CM

## 2024-08-22 DIAGNOSIS — M48.061 NEUROFORAMINAL STENOSIS OF LUMBAR SPINE: ICD-10-CM

## 2024-08-22 PROCEDURE — 1125F AMNT PAIN NOTED PAIN PRSNT: CPT | Performed by: STUDENT IN AN ORGANIZED HEALTH CARE EDUCATION/TRAINING PROGRAM

## 2024-08-22 PROCEDURE — G2211 COMPLEX E/M VISIT ADD ON: HCPCS | Performed by: STUDENT IN AN ORGANIZED HEALTH CARE EDUCATION/TRAINING PROGRAM

## 2024-08-22 PROCEDURE — 99214 OFFICE O/P EST MOD 30 MIN: CPT | Performed by: STUDENT IN AN ORGANIZED HEALTH CARE EDUCATION/TRAINING PROGRAM

## 2024-08-22 RX ORDER — GABAPENTIN 300 MG/1
600 CAPSULE ORAL
Qty: 90 CAPSULE | Refills: 2 | Status: SHIPPED | OUTPATIENT
Start: 2024-08-22

## 2024-08-22 ASSESSMENT — PATIENT HEALTH QUESTIONNAIRE - PHQ9
5. POOR APPETITE OR OVEREATING: 0 - NOT AT ALL
SUM OF ALL RESPONSES TO PHQ QUESTIONS 1-9: 3
CLINICAL INTERPRETATION OF PHQ2 SCORE: 1

## 2024-08-22 ASSESSMENT — FIBROSIS 4 INDEX: FIB4 SCORE: 1.38

## 2024-08-22 ASSESSMENT — PAIN SCALES - GENERAL: PAINLEVEL: 7=MODERATE-SEVERE PAIN

## 2024-08-22 NOTE — PROGRESS NOTES
Verbal consent was acquired by the patient to use [employer] ambient listening note generation during this visit Yes       Follow-up patient Note    Interventional Pain and Spine  Physiatry (Physical Medicine and Rehabilitation)     Patient Name: Danilo Sierra  : 1975  Date of service: 2024    Chief Complaint:   Chief Complaint   Patient presents with    Follow-Up     Back pain       HISTORY (2023):  Danilo Sierra is a 47 y.o. male who presents today with R gluteal pain and R low back pain. Pain originally started as R gluteal pain 10 months ago while deadlifting. Over time the pain has persisted and has fluctuated. His back pain has somewhat improved. Typically his gluteal pain bothers him more than the back pain.  Pain does not radiate past his right glute.     Pain right now is 4/10 on the numeric pain scale. His pain at its best-worse level during the course of the day is typically 4-7/10, respectively.  Pain worsens with bending forward, laying down, and coughing and improves with sitting. The patient endorses tingling in his right foot, especially noticeable at his big toe. Denies focal weakness.     The patient has done physical therapy for this problem with no relief, most recently in Aug 2022.     Patient has tried the following medications with varied success (current meds in bold):   Oral steroids - significant relief  Flexeril - helped for back, not for the glute. No unwanted SE.   Tylenol  Ibuprofen 800mg - unsure relief     Therapeutic modalities and interventional therapies to date include:  -no injections  Chiro - no relief  Massage - no relief     Medical history includes GERD, prediabetes, Hashimotos.    HPI  Today's visit   Danilo Sierra ( 1975) is a male with The primary encounter diagnosis was Right L5 lumbar radiculopathy. Diagnoses of Gluteal pain, Chronic bilateral low back pain with right-sided sciatica, Neuroforaminal stenosis of  lumbar spine, and BMI 31.0-31.9,adult were also pertinent to this visit.    History of Present Illness  The patient presents for evaluation of back pain.    He reports a sudden onset of back pain that began a few weeks ago. The pain, described as constant, extends from his lower buttock down his posterior thigh.  It interferes with his ability to work.  He also has recently developed pain in his right knee, right ankle, and right groin which he feels is due to altered gait mechanics while compensating for his original pain.  Despite the discomfort, he is not experiencing any leg weakness. However, bending over is particularly painful.    His previous treatment included gabapentin and Flexeril.  He denies significant relief with Flexeril. He recently resumed taking gabapentin at night, which aids his sleep, although he still experiences restlessness. Over-the-counter pain relievers such as aspirin and Advil have been ineffective in managing his pain. He has an upcoming physical therapy appointment scheduled for 09/28/2024 for his shoulder which has since stopped hurting..    Pain severity 7/10 currently    ROS:   Red Flags ROS:   Fever, Chills, Sweats: Denies  Involuntary Weight Loss: Denies  Bladder Incontinence: Denies  Bowel Incontinence: denies  Saddle Anesthesia: Denies    All other systems reviewed and negative.     PMHx:   Past Medical History:   Diagnosis Date    Allergic rhinitis 01/09/2015    Apnea, sleep     Chickenpox     Daytime sleepiness     Essential hypertension 06/14/2023    Gasping for breath     GERD (gastroesophageal reflux disease)     Heartburn     Hyperlipidemia     borderline, no meds    Influenza     Insomnia 01/13/2012    Mild depression 07/06/2021    Snoring     Subclinical hypothyroidism 05/11/2022    Wears glasses        PSHx:   Past Surgical History:   Procedure Laterality Date    DENTAL EXTRACTION(S)      [place] Teeth       Family Hx:   Family History   Problem Relation Age of Onset     Hyperlipidemia Mother     Kidney Disease Mother     Cancer Maternal Grandmother         Lung cancer. (Smoker)    Cancer Maternal Grandfather         Colon cancer    Diabetes Maternal Uncle        Social Hx:  Social History     Socioeconomic History    Marital status: Single     Spouse name: Not on file    Number of children: Not on file    Years of education: Not on file    Highest education level: Some college, no degree   Occupational History    Occupation: [email or web address]     Employer: [email or web address]   Tobacco Use    Smoking status: Never    Smokeless tobacco: Never   Vaping Use    Vaping status: Never Used   Substance and Sexual Activity    Alcohol use: Not Currently     Comment: Socially    Drug use: No    Sexual activity: Yes     Partners: Female     Comment: Living with girlfriend   Other Topics Concern    Not on file   Social History Narrative    Not on file     Social Determinants of Health     Financial Resource Strain: Low Risk  (5/10/2022)    Overall Financial Resource Strain (CARDIA)     Difficulty of Paying Living Expenses: Not very hard   Food Insecurity: No Food Insecurity (5/10/2022)    Hunger Vital Sign     Worried About Running Out of Food in the Last Year: Never true     Ran Out of Food in the Last Year: Never true   Transportation Needs: No Transportation Needs (5/10/2022)    PRAPARE - Transportation     Lack of Transportation (Medical): No     Lack of Transportation (Non-Medical): No   Physical Activity: Sufficiently Active (5/10/2022)    Exercise Vital Sign     Days of Exercise per Week: 4 days     Minutes of Exercise per Session: 60 min   Stress: Stress Concern Present (5/10/2022)    [employer] [employer] - Occupational Stress Questionnaire     Feeling of Stress : Rather much   Social Connections: Moderately Isolated (5/10/2022)    Social Connection and Isolation Panel [NHANES]     Frequency of Communication with Friends and Family: Once a week     Frequency of Social Gatherings with  Friends and Family: More than three times a week     Attends Yazidism Services: Never     Active Member of Clubs or Organizations: No     Attends Club or Organization Meetings: Never     Marital Status: Living with partner   Intimate Partner Violence: Not on file   Housing Stability: Low Risk  (5/10/2022)    Housing Stability Vital Sign     Unable to Pay for Housing in the Last Year: No     Number of Places Lived in the Last Year: 1     Unstable Housing in the Last Year: No       Allergies:  No Known Allergies    Medications: reviewed on epic.   Outpatient Medications Marked as Taking for the 8/22/24 encounter (Office Visit) with Latoya Haley M.D.   Medication Sig Dispense Refill    gabapentin (NEURONTIN) 300 MG Cap Take 2 Capsules by mouth at bedtime. 90 Capsule 2    losartan (COZAAR) 100 MG Tab Take 1 Tablet by mouth every day. 90 Tablet 3    traZODone (DESYREL) 50 MG Tab TAKE 1 TO 2 TABLETS BY MOUTH AT BEDTIME 180 Tablet 3    atorvastatin (LIPITOR) 40 MG Tab Take 1 Tablet by mouth every day. 90 Tablet 3    levothyroxine (SYNTHROID) 25 MCG Tab Take 1 Tablet by mouth every morning on an empty stomach. 90 Tablet 3    omeprazole (PRILOSEC) 40 MG delayed-release capsule Take 1 Capsule by mouth every day. 90 Capsule 3    testosterone cypionate (DEPO-TESTOSTERONE) 200 MG/ML Solution injection       Loratadine (CLARITIN PO) Take  by mouth.          Current Outpatient Medications on File Prior to Visit   Medication Sig Dispense Refill    losartan (COZAAR) 100 MG Tab Take 1 Tablet by mouth every day. 90 Tablet 3    traZODone (DESYREL) 50 MG Tab TAKE 1 TO 2 TABLETS BY MOUTH AT BEDTIME 180 Tablet 3    atorvastatin (LIPITOR) 40 MG Tab Take 1 Tablet by mouth every day. 90 Tablet 3    levothyroxine (SYNTHROID) 25 MCG Tab Take 1 Tablet by mouth every morning on an empty stomach. 90 Tablet 3    omeprazole (PRILOSEC) 40 MG delayed-release capsule Take 1 Capsule by mouth every day. 90 Capsule 3    testosterone cypionate  "(DEPO-TESTOSTERONE) 200 MG/ML Solution injection       Loratadine (CLARITIN PO) Take  by mouth.      methylPREDNISolone (MEDROL DOSEPAK) 4 MG Tablet Therapy Pack Take 1 Tablet by mouth every day. Follow schedule on package instructions. (Patient not taking: Reported on 8/22/2024) 21 Tablet 0    cyclobenzaprine (FLEXERIL) 10 mg Tab TAKE 1 TABLET BY MOUTH 3 TIMES A DAY AS NEEDED FOR MUSCLE SPASMS OR MODERATE PAIN. (Patient not taking: Reported on 8/22/2024) 90 Tablet 0     No current facility-administered medications on file prior to visit.         EXAMINATION     Physical Exam:   Pulse 78   Temp 37.3 °C (99.2 °F) (Temporal)   Ht 1.753 m (5' 9\")   Wt 95.9 kg (211 lb 6.7 oz)   SpO2 98%     Constitutional:   Body Habitus: Body mass index is 31.22 kg/m².  Cooperation: Fully cooperates with exam  Appearance: Well-groomed, well-nourished.    Eyes: No scleral icterus to suggest severe liver disease, no proptosis to suggest severe hyperthyroidism    ENT -no obvious auditory deficits, no noticeable facial droop     Skin -no rashes or lesions noted     Respiratory-  breathing comfortably on room air, no audible wheezing    Cardiovascular-distal extremities warm and well perfused.  No lower extremity edema is noted.     Gastrointestinal - no obvious abdominal masses, non-distended    Psychiatric- alert and oriented ×3. Normal affect.     Gait - normal gait, no use of ambulatory device, nonantalgic.      Musculoskeletal and Neuro -      Thoracic/Lumbar Spine/Sacral Spine/Hips   Inspection: No evidence of atrophy in bilateral lower extremities throughout   There is full active range of motion with lumbar extension     Facet loading maneuver negative bilaterally     Palpation:   Tenderness to palpation over the  right posterior lateral glute. No tenderness to palpation elsewhere in the low back/hips including midline of lumbosacral spine, paraspinal muscles on left, lumbar facets bilaterally spanning approximately L1-L5 " "levels, sacroiliac joints bilaterally, PSIS bilaterally, and greater trochanters bilaterally.     Lumbar spine /hip provocative exam maneuvers  Straight leg raise positive on right, negative on left  FADIR test negative bilaterally      SI joint tests  NICOLAS test negative bilaterally  Thigh thrust test negative bilaterally        Key points for the international standards for neurological classification of spinal cord injury (ISNCSCI) to light touch.   Dermatome R L   L2 2 2   L3 2 2   L4 2 2   L5 2 2   S1 2 2   S2 2 2         Motor Exam Lower Extremities  ? Myotome R L   Hip flexion L2 5 5   Knee extension L3 5 5   Ankle dorsiflexion L4 5 5   Toe extension L5 5 5   Ankle plantarflexion S1 5 5        Previous exam  Priformis test neg on right although patient notes tension at his piriformis which he locates to his typical area of pain    Reflexes  ?   R L   Patella   2+ 2+   Achilles    2+ 2+      Clonus of the ankle negative bilaterally     MEDICAL DECISION MAKING    Medical records review: see under HPI section.     DATA    Labs: Personally reviewed at today's visit:     Lab Results   Component Value Date/Time    SODIUM 138 05/29/2024 06:40 AM    POTASSIUM 4.9 05/29/2024 06:40 AM    CHLORIDE 100 05/29/2024 06:40 AM    CO2 26 05/29/2024 06:40 AM    ANION 12.0 05/29/2024 06:40 AM    GLUCOSE 90 05/29/2024 06:40 AM    BUN 23 (H) 05/29/2024 06:40 AM    CREATININE 1.22 05/29/2024 06:40 AM    CALCIUM 9.6 05/29/2024 06:40 AM    ASTSGOT 64 (H) 07/03/2024 03:46 PM    ALTSGPT 52 (H) 07/03/2024 03:46 PM    TBILIRUBIN 1.1 07/03/2024 03:46 PM    ALBUMIN 4.4 07/03/2024 03:46 PM    TOTPROTEIN 7.1 07/03/2024 03:46 PM    GLOBULIN 2.7 05/29/2024 06:40 AM    AGRATIO 1.7 05/29/2024 06:40 AM       No results found for: \"PROTHROMBTM\", \"INR\"     Lab Results   Component Value Date/Time    WBC 5.7 05/29/2024 06:42 AM    RBC 5.81 05/29/2024 06:42 AM    HEMOGLOBIN 14.9 05/29/2024 06:42 AM    HEMATOCRIT 46.3 05/29/2024 06:42 AM    MCV 79.7 " (L) 2024 06:42 AM    MCH 25.6 (L) 2024 06:42 AM    MCHC 32.2 (L) 2024 06:42 AM    MPV 9.7 2024 06:42 AM    NEUTSPOLYS 60.80 2024 06:42 AM    LYMPHOCYTES 24.00 2024 06:42 AM    MONOCYTES 10.20 2024 06:42 AM    EOSINOPHILS 3.50 2024 06:42 AM    BASOPHILS 1.10 2024 06:42 AM        Lab Results   Component Value Date/Time    HBA1C 5.3 2024 06:40 AM        Imaging:   I personally reviewed following images, these are my reads  MRI lumbar spine 23  Very mild disc bulge at L2-3, L3-4, and L4-5.  Disc bulge at L4-5 has a small right paracentral disc protrusion likely impinging upon the descending right L5 nerve.  Mild central canal stenosis and right neuroforaminal stenosis at this level.  Bilateral facet arthropathy appearing most notable at L2-3, L3-4, and L4-5. See formal radiology report for further details.        IMAGING radiology reads. I reviewed the following radiology reads           Results for orders placed during the hospital encounter of 23    MR-LUMBAR SPINE-W/O    Impression  1.  L4-L5 right paracentral disc protrusion which likely impinges on the right L5 nerve in the lateral recess in keeping with history. Mild spinal stenosis and right foraminal narrowing.  2.  Mild lumbar spondylosis elsewhere as detailed.                                                                   Diagnosis  Visit Diagnoses     ICD-10-CM   1. Right L5 lumbar radiculopathy  M54.16   2. Gluteal pain  M79.18   3. Chronic bilateral low back pain with right-sided sciatica  M54.41    G89.29   4. Neuroforaminal stenosis of lumbar spine  M48.061   5. BMI 31.0-31.9,adult  Z68.31               ASSESSMENT AND PLAN:  Danilo Sierra (: 1975) is a male with worsened right-sided gluteal pain radiating down his right leg.    Possible element of right piriformis tightness as patient locates his area of pain now to the right piriformis     Torstendona was seen  today for follow-up.    Diagnoses and all orders for this visit:    Right L5 lumbar radiculopathy  -     Referral to Physical Therapy  -     Referral to Pain Clinic    Gluteal pain  -     Referral to Physical Therapy    Chronic bilateral low back pain with right-sided sciatica  -     Referral to Physical Therapy    Neuroforaminal stenosis of lumbar spine  -     Referral to Physical Therapy    BMI 31.0-31.9,adult  -     Patient identified as having weight management issue.  Appropriate orders and counseling given.    Other orders  -     gabapentin (NEURONTIN) 300 MG Cap; Take 2 Capsules by mouth at bedtime.                PLAN  Physical Therapy: referral to PT today. Discussed that this would be the best long term treatment to improvement his pain and minimize the chance of it recurring with or without an injection    Diagnostic workup: no new imaging needed at this time    Medications:   -Continue gabapentin, refill today.    Interventions:   -discussed possible right L5-S1 transforaminal epidural steroid injection.  Discussed that if his pain is manageable, it is reasonable to do for this injection.   Discussed that the risks, benefits, and alternatives to this procedure were discussed and the patient wishes to proceed with the procedure. Risks include but are not limited to damage to surrounding structures, infection, bleeding, worsening of pain which can be permanent, and weakness which can be permanent. Benefits include pain relief and improved function. Alternatives include not doing the procedure.      Follow-up: 4 weeks after injection    Orders Placed This Encounter    Referral to Physical Therapy    Referral to Pain Clinic    Patient identified as having weight management issue.  Appropriate orders and counseling given.    gabapentin (NEURONTIN) 300 MG Cap       Latoya Haley MD  Interventional Pain and Spine  Physical Medicine and Rehabilitation  [facility]      The above note documents my  personal evaluation of this patient. In addition, I have reviewed and confirmed with the patient and MA the supportive information documented in today's Patient Health Questionnaire and Office Note.     Please note that this dictation was created using voice recognition software. I have made every reasonable attempt to correct obvious errors, but I expect that there are errors of grammar and possibly content that I did not discover before finalizing the note.

## 2024-08-22 NOTE — H&P (VIEW-ONLY)
Verbal consent was acquired by the patient to use [employer] ambient listening note generation during this visit Yes       Follow-up patient Note    Interventional Pain and Spine  Physiatry (Physical Medicine and Rehabilitation)     Patient Name: Danilo Sierra  : 1975  Date of service: 2024    Chief Complaint:   Chief Complaint   Patient presents with    Follow-Up     Back pain       HISTORY (2023):  Danilo Sierra is a 47 y.o. male who presents today with R gluteal pain and R low back pain. Pain originally started as R gluteal pain 10 months ago while deadlifting. Over time the pain has persisted and has fluctuated. His back pain has somewhat improved. Typically his gluteal pain bothers him more than the back pain.  Pain does not radiate past his right glute.     Pain right now is 4/10 on the numeric pain scale. His pain at its best-worse level during the course of the day is typically 4-7/10, respectively.  Pain worsens with bending forward, laying down, and coughing and improves with sitting. The patient endorses tingling in his right foot, especially noticeable at his big toe. Denies focal weakness.     The patient has done physical therapy for this problem with no relief, most recently in Aug 2022.     Patient has tried the following medications with varied success (current meds in bold):   Oral steroids - significant relief  Flexeril - helped for back, not for the glute. No unwanted SE.   Tylenol  Ibuprofen 800mg - unsure relief     Therapeutic modalities and interventional therapies to date include:  -no injections  Chiro - no relief  Massage - no relief     Medical history includes GERD, prediabetes, Hashimotos.    HPI  Today's visit   Danilo Sierra ( 1975) is a male with The primary encounter diagnosis was Right L5 lumbar radiculopathy. Diagnoses of Gluteal pain, Chronic bilateral low back pain with right-sided sciatica, Neuroforaminal stenosis of  lumbar spine, and BMI 31.0-31.9,adult were also pertinent to this visit.    History of Present Illness  The patient presents for evaluation of back pain.    He reports a sudden onset of back pain that began a few weeks ago. The pain, described as constant, extends from his lower buttock down his posterior thigh.  It interferes with his ability to work.  He also has recently developed pain in his right knee, right ankle, and right groin which he feels is due to altered gait mechanics while compensating for his original pain.  Despite the discomfort, he is not experiencing any leg weakness. However, bending over is particularly painful.    His previous treatment included gabapentin and Flexeril.  He denies significant relief with Flexeril. He recently resumed taking gabapentin at night, which aids his sleep, although he still experiences restlessness. Over-the-counter pain relievers such as aspirin and Advil have been ineffective in managing his pain. He has an upcoming physical therapy appointment scheduled for 09/28/2024 for his shoulder which has since stopped hurting..    Pain severity 7/10 currently    ROS:   Red Flags ROS:   Fever, Chills, Sweats: Denies  Involuntary Weight Loss: Denies  Bladder Incontinence: Denies  Bowel Incontinence: denies  Saddle Anesthesia: Denies    All other systems reviewed and negative.     PMHx:   Past Medical History:   Diagnosis Date    Allergic rhinitis 01/09/2015    Apnea, sleep     Chickenpox     Daytime sleepiness     Essential hypertension 06/14/2023    Gasping for breath     GERD (gastroesophageal reflux disease)     Heartburn     Hyperlipidemia     borderline, no meds    Influenza     Insomnia 01/13/2012    Mild depression 07/06/2021    Snoring     Subclinical hypothyroidism 05/11/2022    Wears glasses        PSHx:   Past Surgical History:   Procedure Laterality Date    DENTAL EXTRACTION(S)      Buchanan Teeth       Family Hx:   Family History   Problem Relation Age of Onset     Hyperlipidemia Mother     Kidney Disease Mother     Cancer Maternal Grandmother         Lung cancer. (Smoker)    Cancer Maternal Grandfather         Colon cancer    Diabetes Maternal Uncle        Social Hx:  Social History     Socioeconomic History    Marital status: Single     Spouse name: Not on file    Number of children: Not on file    Years of education: Not on file    Highest education level: Some college, no degree   Occupational History    Occupation: [employer]     Employer: [employer]   Tobacco Use    Smoking status: Never    Smokeless tobacco: Never   Vaping Use    Vaping status: Never Used   Substance and Sexual Activity    Alcohol use: Not Currently     Comment: Socially    Drug use: No    Sexual activity: Yes     Partners: Female     Comment: Living with girlfriend   Other Topics Concern    Not on file   Social History Narrative    Not on file     Social Determinants of Health     Financial Resource Strain: Low Risk  (5/10/2022)    Overall Financial Resource Strain (CARDIA)     Difficulty of Paying Living Expenses: Not very hard   Food Insecurity: No Food Insecurity (5/10/2022)    Hunger Vital Sign     Worried About Running Out of Food in the Last Year: Never true     Ran Out of Food in the Last Year: Never true   Transportation Needs: No Transportation Needs (5/10/2022)    PRAPARE - Transportation     Lack of Transportation (Medical): No     Lack of Transportation (Non-Medical): No   Physical Activity: Sufficiently Active (5/10/2022)    Exercise Vital Sign     Days of Exercise per Week: 4 days     Minutes of Exercise per Session: 60 min   Stress: Stress Concern Present (5/10/2022)    [place] [employer] - Occupational Stress Questionnaire     Feeling of Stress : Rather much   Social Connections: Moderately Isolated (5/10/2022)    Social Connection and Isolation Panel [NHANES]     Frequency of Communication with Friends and Family: Once a week     Frequency of Social Gatherings with  Friends and Family: More than three times a week     Attends Congregation Services: Never     Active Member of Clubs or Organizations: No     Attends Club or Organization Meetings: Never     Marital Status: Living with partner   Intimate Partner Violence: Not on file   Housing Stability: Low Risk  (5/10/2022)    Housing Stability Vital Sign     Unable to Pay for Housing in the Last Year: No     Number of Places Lived in the Last Year: 1     Unstable Housing in the Last Year: No       Allergies:  No Known Allergies    Medications: reviewed on epic.   Outpatient Medications Marked as Taking for the 8/22/24 encounter (Office Visit) with Latoya Haley M.D.   Medication Sig Dispense Refill    gabapentin (NEURONTIN) 300 MG Cap Take 2 Capsules by mouth at bedtime. 90 Capsule 2    losartan (COZAAR) 100 MG Tab Take 1 Tablet by mouth every day. 90 Tablet 3    traZODone (DESYREL) 50 MG Tab TAKE 1 TO 2 TABLETS BY MOUTH AT BEDTIME 180 Tablet 3    atorvastatin (LIPITOR) 40 MG Tab Take 1 Tablet by mouth every day. 90 Tablet 3    levothyroxine (SYNTHROID) 25 MCG Tab Take 1 Tablet by mouth every morning on an empty stomach. 90 Tablet 3    omeprazole (PRILOSEC) 40 MG delayed-release capsule Take 1 Capsule by mouth every day. 90 Capsule 3    testosterone cypionate (DEPO-TESTOSTERONE) 200 MG/ML Solution injection       Loratadine (CLARITIN PO) Take  by mouth.          Current Outpatient Medications on File Prior to Visit   Medication Sig Dispense Refill    losartan (COZAAR) 100 MG Tab Take 1 Tablet by mouth every day. 90 Tablet 3    traZODone (DESYREL) 50 MG Tab TAKE 1 TO 2 TABLETS BY MOUTH AT BEDTIME 180 Tablet 3    atorvastatin (LIPITOR) 40 MG Tab Take 1 Tablet by mouth every day. 90 Tablet 3    levothyroxine (SYNTHROID) 25 MCG Tab Take 1 Tablet by mouth every morning on an empty stomach. 90 Tablet 3    omeprazole (PRILOSEC) 40 MG delayed-release capsule Take 1 Capsule by mouth every day. 90 Capsule 3    testosterone cypionate  "(DEPO-TESTOSTERONE) 200 MG/ML Solution injection       Loratadine (CLARITIN PO) Take  by mouth.      methylPREDNISolone (MEDROL DOSEPAK) 4 MG Tablet Therapy Pack Take 1 Tablet by mouth every day. Follow schedule on package instructions. (Patient not taking: Reported on 8/22/2024) 21 Tablet 0    cyclobenzaprine (FLEXERIL) 10 mg Tab TAKE 1 TABLET BY MOUTH 3 TIMES A DAY AS NEEDED FOR MUSCLE SPASMS OR MODERATE PAIN. (Patient not taking: Reported on 8/22/2024) 90 Tablet 0     No current facility-administered medications on file prior to visit.         EXAMINATION     Physical Exam:   Pulse 78   Temp 37.3 °C (99.2 °F) (Temporal)   Ht 1.753 m (5' 9\")   Wt 95.9 kg (211 lb 6.7 oz)   SpO2 98%     Constitutional:   Body Habitus: Body mass index is 31.22 kg/m².  Cooperation: Fully cooperates with exam  Appearance: Well-groomed, well-nourished.    Eyes: No scleral icterus to suggest severe liver disease, no proptosis to suggest severe hyperthyroidism    ENT -no obvious auditory deficits, no noticeable facial droop     Skin -no rashes or lesions noted     Respiratory-  breathing comfortably on room air, no audible wheezing    Cardiovascular-distal extremities warm and well perfused.  No lower extremity edema is noted.     Gastrointestinal - no obvious abdominal masses, non-distended    Psychiatric- alert and oriented ×3. Normal affect.     Gait - normal gait, no use of ambulatory device, nonantalgic.      Musculoskeletal and Neuro -      Thoracic/Lumbar Spine/Sacral Spine/Hips   Inspection: No evidence of atrophy in bilateral lower extremities throughout   There is full active range of motion with lumbar extension     Facet loading maneuver negative bilaterally     Palpation:   Tenderness to palpation over the  right posterior lateral glute. No tenderness to palpation elsewhere in the low back/hips including midline of lumbosacral spine, paraspinal muscles on left, lumbar facets bilaterally spanning approximately L1-L5 " "levels, sacroiliac joints bilaterally, PSIS bilaterally, and greater trochanters bilaterally.     Lumbar spine /hip provocative exam maneuvers  Straight leg raise positive on right, negative on left  FADIR test negative bilaterally      SI joint tests  NICOLAS test negative bilaterally  Thigh thrust test negative bilaterally        Key points for the international standards for neurological classification of spinal cord injury (ISNCSCI) to light touch.   Dermatome R L   L2 2 2   L3 2 2   L4 2 2   L5 2 2   S1 2 2   S2 2 2         Motor Exam Lower Extremities  ? Myotome R L   Hip flexion L2 5 5   Knee extension L3 5 5   Ankle dorsiflexion L4 5 5   Toe extension L5 5 5   Ankle plantarflexion S1 5 5        Previous exam  Priformis test neg on right although patient notes tension at his piriformis which he locates to his typical area of pain    Reflexes  ?   R L   Patella   2+ 2+   Achilles    2+ 2+      Clonus of the ankle negative bilaterally     MEDICAL DECISION MAKING    Medical records review: see under HPI section.     DATA    Labs: Personally reviewed at today's visit:     Lab Results   Component Value Date/Time    SODIUM 138 05/29/2024 06:40 AM    POTASSIUM 4.9 05/29/2024 06:40 AM    CHLORIDE 100 05/29/2024 06:40 AM    CO2 26 05/29/2024 06:40 AM    ANION 12.0 05/29/2024 06:40 AM    GLUCOSE 90 05/29/2024 06:40 AM    BUN 23 (H) 05/29/2024 06:40 AM    CREATININE 1.22 05/29/2024 06:40 AM    CALCIUM 9.6 05/29/2024 06:40 AM    ASTSGOT 64 (H) 07/03/2024 03:46 PM    ALTSGPT 52 (H) 07/03/2024 03:46 PM    TBILIRUBIN 1.1 07/03/2024 03:46 PM    ALBUMIN 4.4 07/03/2024 03:46 PM    TOTPROTEIN 7.1 07/03/2024 03:46 PM    GLOBULIN 2.7 05/29/2024 06:40 AM    AGRATIO 1.7 05/29/2024 06:40 AM       No results found for: \"PROTHROMBTM\", \"INR\"     Lab Results   Component Value Date/Time    WBC 5.7 05/29/2024 06:42 AM    RBC 5.81 05/29/2024 06:42 AM    HEMOGLOBIN 14.9 05/29/2024 06:42 AM    HEMATOCRIT 46.3 05/29/2024 06:42 AM    MCV 79.7 " (L) 2024 06:42 AM    MCH 25.6 (L) 2024 06:42 AM    MCHC 32.2 (L) 2024 06:42 AM    MPV 9.7 2024 06:42 AM    NEUTSPOLYS 60.80 2024 06:42 AM    LYMPHOCYTES 24.00 2024 06:42 AM    MONOCYTES 10.20 2024 06:42 AM    EOSINOPHILS 3.50 2024 06:42 AM    BASOPHILS 1.10 2024 06:42 AM        Lab Results   Component Value Date/Time    HBA1C 5.3 2024 06:40 AM        Imaging:   I personally reviewed following images, these are my reads  MRI lumbar spine 23  Very mild disc bulge at L2-3, L3-4, and L4-5.  Disc bulge at L4-5 has a small right paracentral disc protrusion likely impinging upon the descending right L5 nerve.  Mild central canal stenosis and right neuroforaminal stenosis at this level.  Bilateral facet arthropathy appearing most notable at L2-3, L3-4, and L4-5. See formal radiology report for further details.        IMAGING radiology reads. I reviewed the following radiology reads           Results for orders placed during the hospital encounter of 23    MR-LUMBAR SPINE-W/O    Impression  1.  L4-L5 right paracentral disc protrusion which likely impinges on the right L5 nerve in the lateral recess in keeping with history. Mild spinal stenosis and right foraminal narrowing.  2.  Mild lumbar spondylosis elsewhere as detailed.                                                                   Diagnosis  Visit Diagnoses     ICD-10-CM   1. Right L5 lumbar radiculopathy  M54.16   2. Gluteal pain  M79.18   3. Chronic bilateral low back pain with right-sided sciatica  M54.41    G89.29   4. Neuroforaminal stenosis of lumbar spine  M48.061   5. BMI 31.0-31.9,adult  Z68.31               ASSESSMENT AND PLAN:  Danilo Sierra (: 1975) is a male with worsened right-sided gluteal pain radiating down his right leg.    Possible element of right piriformis tightness as patient locates his area of pain now to the right piriformis     Torstendona was seen  today for follow-up.    Diagnoses and all orders for this visit:    Right L5 lumbar radiculopathy  -     Referral to Physical Therapy  -     Referral to Pain Clinic    Gluteal pain  -     Referral to Physical Therapy    Chronic bilateral low back pain with right-sided sciatica  -     Referral to Physical Therapy    Neuroforaminal stenosis of lumbar spine  -     Referral to Physical Therapy    BMI 31.0-31.9,adult  -     Patient identified as having weight management issue.  Appropriate orders and counseling given.    Other orders  -     gabapentin (NEURONTIN) 300 MG Cap; Take 2 Capsules by mouth at bedtime.                PLAN  Physical Therapy: referral to PT today. Discussed that this would be the best long term treatment to improvement his pain and minimize the chance of it recurring with or without an injection    Diagnostic workup: no new imaging needed at this time    Medications:   -Continue gabapentin, refill today.    Interventions:   -discussed possible right L5-S1 transforaminal epidural steroid injection.  Discussed that if his pain is manageable, it is reasonable to do for this injection.   Discussed that the risks, benefits, and alternatives to this procedure were discussed and the patient wishes to proceed with the procedure. Risks include but are not limited to damage to surrounding structures, infection, bleeding, worsening of pain which can be permanent, and weakness which can be permanent. Benefits include pain relief and improved function. Alternatives include not doing the procedure.      Follow-up: 4 weeks after injection    Orders Placed This Encounter    Referral to Physical Therapy    Referral to Pain Clinic    Patient identified as having weight management issue.  Appropriate orders and counseling given.    gabapentin (NEURONTIN) 300 MG Cap       Latoya Haley MD  Interventional Pain and Spine  Physical Medicine and Rehabilitation  [facility]      The above note documents my  personal evaluation of this patient. In addition, I have reviewed and confirmed with the patient and MA the supportive information documented in today's Patient Health Questionnaire and Office Note.     Please note that this dictation was created using voice recognition software. I have made every reasonable attempt to correct obvious errors, but I expect that there are errors of grammar and possibly content that I did not discover before finalizing the note.

## 2024-09-12 ENCOUNTER — APPOINTMENT (OUTPATIENT)
Dept: RADIOLOGY | Facility: MEDICAL CENTER | Age: 49
End: 2024-09-12
Attending: STUDENT IN AN ORGANIZED HEALTH CARE EDUCATION/TRAINING PROGRAM
Payer: COMMERCIAL

## 2024-09-12 ENCOUNTER — HOSPITAL ENCOUNTER (OUTPATIENT)
Facility: MEDICAL CENTER | Age: 49
End: 2024-09-12
Attending: STUDENT IN AN ORGANIZED HEALTH CARE EDUCATION/TRAINING PROGRAM | Admitting: STUDENT IN AN ORGANIZED HEALTH CARE EDUCATION/TRAINING PROGRAM
Payer: COMMERCIAL

## 2024-09-12 VITALS
HEART RATE: 64 BPM | DIASTOLIC BLOOD PRESSURE: 88 MMHG | RESPIRATION RATE: 16 BRPM | HEIGHT: 69 IN | WEIGHT: 210.1 LBS | TEMPERATURE: 97.2 F | SYSTOLIC BLOOD PRESSURE: 138 MMHG | OXYGEN SATURATION: 96 % | BODY MASS INDEX: 31.12 KG/M2

## 2024-09-12 PROCEDURE — 700111 HCHG RX REV CODE 636 W/ 250 OVERRIDE (IP): Performed by: STUDENT IN AN ORGANIZED HEALTH CARE EDUCATION/TRAINING PROGRAM

## 2024-09-12 PROCEDURE — 160028 HCHG SURGERY MINUTES - 1ST 30 MINS LEVEL 3: Performed by: STUDENT IN AN ORGANIZED HEALTH CARE EDUCATION/TRAINING PROGRAM

## 2024-09-12 PROCEDURE — 700101 HCHG RX REV CODE 250: Performed by: STUDENT IN AN ORGANIZED HEALTH CARE EDUCATION/TRAINING PROGRAM

## 2024-09-12 PROCEDURE — 160025 RECOVERY II MINUTES (STATS): Performed by: STUDENT IN AN ORGANIZED HEALTH CARE EDUCATION/TRAINING PROGRAM

## 2024-09-12 PROCEDURE — 700117 HCHG RX CONTRAST REV CODE 255: Performed by: STUDENT IN AN ORGANIZED HEALTH CARE EDUCATION/TRAINING PROGRAM

## 2024-09-12 PROCEDURE — 160046 HCHG PACU - 1ST 60 MINS PHASE II: Performed by: STUDENT IN AN ORGANIZED HEALTH CARE EDUCATION/TRAINING PROGRAM

## 2024-09-12 PROCEDURE — 160048 HCHG OR STATISTICAL LEVEL 1-5: Performed by: STUDENT IN AN ORGANIZED HEALTH CARE EDUCATION/TRAINING PROGRAM

## 2024-09-12 RX ORDER — DEXAMETHASONE SODIUM PHOSPHATE 4 MG/ML
INJECTION, SOLUTION INTRA-ARTICULAR; INTRALESIONAL; INTRAMUSCULAR; INTRAVENOUS; SOFT TISSUE
Status: DISCONTINUED | OUTPATIENT
Start: 2024-09-12 | End: 2024-09-12 | Stop reason: HOSPADM

## 2024-09-12 RX ORDER — LIDOCAINE HYDROCHLORIDE 10 MG/ML
INJECTION, SOLUTION INFILTRATION; PERINEURAL
Status: DISCONTINUED | OUTPATIENT
Start: 2024-09-12 | End: 2024-09-12 | Stop reason: HOSPADM

## 2024-09-12 ASSESSMENT — PAIN DESCRIPTION - PAIN TYPE
TYPE: CHRONIC PAIN
TYPE: CHRONIC PAIN

## 2024-09-12 ASSESSMENT — FIBROSIS 4 INDEX: FIB4 SCORE: 1.38

## 2024-09-12 NOTE — INTERVAL H&P NOTE
H&P reviewed. The patient was examined and there are no changes to the H&P    Latoya Haley MD  Interventional Pain and Spine  Physical Medicine and Rehabilitation  [facility]

## 2024-09-12 NOTE — OP REPORT
Date of Service: 9/12/2024     Patient: Danilo Sierra 49 y.o. male     MRN: [id number]     Physician/s: Latoya Haley MD    Pre-operative Diagnosis: Lumbar radiculopathy    Post-operative Diagnosis: Lumbar radiculopathy    Procedure: Lumbar Transforaminal Epidural Steroid Injection at the  right  L5-S1 levels.     Description of procedure:    The risks, benefits, and alternatives of the procedure were reviewed and discussed with the patient.  Written informed consent was freely obtained. A pre-procedural time-out was conducted by the physician verifying patient’s identity, procedure to be performed, procedure site and side, and allergy verification. Appropriate equipment was determined to be in place for the procedure.     The patient's vital signs were carefully monitored before, throughout, and after the procedure.     In the fluoroscopy suite the patient was placed in a prone position, a pillow placed underneath their umbilicus. The skin was prepped and draped in the usual sterile fashion.     The fluoroscope was placed over the lumbar spine and adjusted into the proper AP/Oblique view to enter the transforaminal space just adjacent to the pedicle at the levels below. The targets for injection were then marked at the right L5-S1. A 27g 1.5 inch needle was placed into the marked site, and approximately 1mL of 1% Lidocaine was injected subcutaneously into the epidermal and dermal layers. The needle was removed intact.  A 22g 5 inch spinal needle was then placed and advanced under fluoroscopic guidance in an oblique view towards the epidural space of the levels noted above. The needle position was confirmed to not be past the 6 o'clock position in the AP view and it was in the neuroforamen in the lateral view.     Under live fluoroscopic guidance in the AP view, contrast dye was used to highlight the epidural space spread of each level above. Final fluoroscopic images were saved.  Following negative  aspiration, 1mL of 1% lidocaine preservative free with 1mL of 10mg/mL of dexamethasone was then injected at each level above, and the needles were removed intact after being restyleted. The patient's back was covered with a 4x4 gauze, the area was cleansed with sterile normal saline, and a dressing was applied. There were no complications noted.     The patient was then evaluated post-procedure, and was hemodynamically stable prior to leaving the post-operative care unit.     Follow-up as scheduled    Latoya Haley MD  Interventional Pain and Spine  Physical Medicine and Rehabilitation  [facility]    Pain score prior to injection: 6/10 on NRS  Pain score immediately after injection: 6/10 on NRS    CPT codes  Transforaminal epidural injection- lumbar or sacral (first level):  46160

## 2024-09-13 NOTE — PROGRESS NOTES
Patient driving self,  notified. Out to see patient strungth test done. Patient ok to drive himself per Dr. Haley

## 2024-10-10 ENCOUNTER — OFFICE VISIT (OUTPATIENT)
Dept: PHYSICAL MEDICINE AND REHAB | Facility: MEDICAL CENTER | Age: 49
End: 2024-10-10
Payer: COMMERCIAL

## 2024-10-10 VITALS
TEMPERATURE: 99 F | HEIGHT: 69 IN | DIASTOLIC BLOOD PRESSURE: 93 MMHG | HEART RATE: 72 BPM | BODY MASS INDEX: 31.64 KG/M2 | WEIGHT: 213.63 LBS | OXYGEN SATURATION: 98 % | SYSTOLIC BLOOD PRESSURE: 147 MMHG

## 2024-10-10 DIAGNOSIS — M54.41 CHRONIC BILATERAL LOW BACK PAIN WITH RIGHT-SIDED SCIATICA: ICD-10-CM

## 2024-10-10 DIAGNOSIS — G89.29 CHRONIC BILATERAL LOW BACK PAIN WITH RIGHT-SIDED SCIATICA: ICD-10-CM

## 2024-10-10 DIAGNOSIS — M54.16 RIGHT LUMBAR RADICULOPATHY: ICD-10-CM

## 2024-10-10 DIAGNOSIS — M79.18 GLUTEAL PAIN: ICD-10-CM

## 2024-10-10 DIAGNOSIS — M48.061 NEUROFORAMINAL STENOSIS OF LUMBAR SPINE: ICD-10-CM

## 2024-10-10 PROCEDURE — 1125F AMNT PAIN NOTED PAIN PRSNT: CPT | Performed by: STUDENT IN AN ORGANIZED HEALTH CARE EDUCATION/TRAINING PROGRAM

## 2024-10-10 PROCEDURE — 99214 OFFICE O/P EST MOD 30 MIN: CPT | Performed by: STUDENT IN AN ORGANIZED HEALTH CARE EDUCATION/TRAINING PROGRAM

## 2024-10-10 PROCEDURE — 3077F SYST BP >= 140 MM HG: CPT | Performed by: STUDENT IN AN ORGANIZED HEALTH CARE EDUCATION/TRAINING PROGRAM

## 2024-10-10 PROCEDURE — G2211 COMPLEX E/M VISIT ADD ON: HCPCS | Performed by: STUDENT IN AN ORGANIZED HEALTH CARE EDUCATION/TRAINING PROGRAM

## 2024-10-10 PROCEDURE — 3080F DIAST BP >= 90 MM HG: CPT | Performed by: STUDENT IN AN ORGANIZED HEALTH CARE EDUCATION/TRAINING PROGRAM

## 2024-10-10 ASSESSMENT — FIBROSIS 4 INDEX: FIB4 SCORE: 1.38

## 2024-10-10 ASSESSMENT — PATIENT HEALTH QUESTIONNAIRE - PHQ9: CLINICAL INTERPRETATION OF PHQ2 SCORE: 0

## 2024-10-10 ASSESSMENT — PAIN SCALES - GENERAL: PAINLEVEL: 2=MINIMAL-SLIGHT

## 2024-11-15 SDOH — HEALTH STABILITY: PHYSICAL HEALTH: ON AVERAGE, HOW MANY DAYS PER WEEK DO YOU ENGAGE IN MODERATE TO STRENUOUS EXERCISE (LIKE A BRISK WALK)?: 2 DAYS

## 2024-11-15 SDOH — HEALTH STABILITY: PHYSICAL HEALTH: ON AVERAGE, HOW MANY MINUTES DO YOU ENGAGE IN EXERCISE AT THIS LEVEL?: 30 MIN

## 2024-11-15 SDOH — ECONOMIC STABILITY: FOOD INSECURITY: WITHIN THE PAST 12 MONTHS, THE FOOD YOU BOUGHT JUST DIDN'T LAST AND YOU DIDN'T HAVE MONEY TO GET MORE.: NEVER TRUE

## 2024-11-15 SDOH — ECONOMIC STABILITY: INCOME INSECURITY: IN THE LAST 12 MONTHS, WAS THERE A TIME WHEN YOU WERE NOT ABLE TO PAY THE MORTGAGE OR RENT ON TIME?: NO

## 2024-11-15 SDOH — HEALTH STABILITY: MENTAL HEALTH
STRESS IS WHEN SOMEONE FEELS TENSE, NERVOUS, ANXIOUS, OR CAN'T SLEEP AT NIGHT BECAUSE THEIR MIND IS TROUBLED. HOW STRESSED ARE YOU?: ONLY A LITTLE

## 2024-11-15 SDOH — ECONOMIC STABILITY: INCOME INSECURITY: HOW HARD IS IT FOR YOU TO PAY FOR THE VERY BASICS LIKE FOOD, HOUSING, MEDICAL CARE, AND HEATING?: NOT VERY HARD

## 2024-11-15 SDOH — ECONOMIC STABILITY: FOOD INSECURITY: WITHIN THE PAST 12 MONTHS, YOU WORRIED THAT YOUR FOOD WOULD RUN OUT BEFORE YOU GOT MONEY TO BUY MORE.: NEVER TRUE

## 2024-11-15 ASSESSMENT — SOCIAL DETERMINANTS OF HEALTH (SDOH)
HOW OFTEN DO YOU ATTENT MEETINGS OF THE CLUB OR ORGANIZATION YOU BELONG TO?: NEVER
HOW MANY DRINKS CONTAINING ALCOHOL DO YOU HAVE ON A TYPICAL DAY WHEN YOU ARE DRINKING: 3 OR 4
HOW OFTEN DO YOU HAVE A DRINK CONTAINING ALCOHOL: 2-4 TIMES A MONTH
WITHIN THE PAST 12 MONTHS, YOU WORRIED THAT YOUR FOOD WOULD RUN OUT BEFORE YOU GOT THE MONEY TO BUY MORE: NEVER TRUE
IN A TYPICAL WEEK, HOW MANY TIMES DO YOU TALK ON THE PHONE WITH FAMILY, FRIENDS, OR NEIGHBORS?: NEVER
IN A TYPICAL WEEK, HOW MANY TIMES DO YOU TALK ON THE PHONE WITH FAMILY, FRIENDS, OR NEIGHBORS?: NEVER
DO YOU BELONG TO ANY CLUBS OR ORGANIZATIONS SUCH AS CHURCH GROUPS UNIONS, FRATERNAL OR ATHLETIC GROUPS, OR SCHOOL GROUPS?: NO
HOW OFTEN DO YOU GET TOGETHER WITH FRIENDS OR RELATIVES?: ONCE A WEEK
HOW OFTEN DO YOU ATTEND CHURCH OR RELIGIOUS SERVICES?: NEVER
HOW OFTEN DO YOU GET TOGETHER WITH FRIENDS OR RELATIVES?: ONCE A WEEK
DO YOU BELONG TO ANY CLUBS OR ORGANIZATIONS SUCH AS CHURCH GROUPS UNIONS, FRATERNAL OR ATHLETIC GROUPS, OR SCHOOL GROUPS?: NO
HOW OFTEN DO YOU HAVE SIX OR MORE DRINKS ON ONE OCCASION: LESS THAN MONTHLY
IN THE PAST 12 MONTHS, HAS THE ELECTRIC, GAS, OIL, OR WATER COMPANY THREATENED TO SHUT OFF SERVICE IN YOUR HOME?: NO
HOW OFTEN DO YOU ATTEND CHURCH OR RELIGIOUS SERVICES?: NEVER
HOW OFTEN DO YOU ATTENT MEETINGS OF THE CLUB OR ORGANIZATION YOU BELONG TO?: NEVER
HOW HARD IS IT FOR YOU TO PAY FOR THE VERY BASICS LIKE FOOD, HOUSING, MEDICAL CARE, AND HEATING?: NOT VERY HARD

## 2024-11-15 ASSESSMENT — LIFESTYLE VARIABLES
HOW OFTEN DO YOU HAVE A DRINK CONTAINING ALCOHOL: 2-4 TIMES A MONTH
HOW MANY STANDARD DRINKS CONTAINING ALCOHOL DO YOU HAVE ON A TYPICAL DAY: 3 OR 4
HOW OFTEN DO YOU HAVE SIX OR MORE DRINKS ON ONE OCCASION: LESS THAN MONTHLY
SKIP TO QUESTIONS 9-10: 0
AUDIT-C TOTAL SCORE: 4

## 2024-11-18 ENCOUNTER — OFFICE VISIT (OUTPATIENT)
Dept: MEDICAL GROUP | Facility: MEDICAL CENTER | Age: 49
End: 2024-11-18
Payer: COMMERCIAL

## 2024-11-18 VITALS
DIASTOLIC BLOOD PRESSURE: 80 MMHG | SYSTOLIC BLOOD PRESSURE: 132 MMHG | BODY MASS INDEX: 30.66 KG/M2 | WEIGHT: 207 LBS | HEIGHT: 69 IN | TEMPERATURE: 97.9 F | HEART RATE: 65 BPM | OXYGEN SATURATION: 97 %

## 2024-11-18 DIAGNOSIS — E06.3 HYPOTHYROIDISM DUE TO HASHIMOTO'S THYROIDITIS: ICD-10-CM

## 2024-11-18 DIAGNOSIS — I10 ESSENTIAL HYPERTENSION: ICD-10-CM

## 2024-11-18 DIAGNOSIS — E78.00 PURE HYPERCHOLESTEROLEMIA: ICD-10-CM

## 2024-11-18 DIAGNOSIS — Z00.00 ENCOUNTER FOR PREVENTIVE CARE: ICD-10-CM

## 2024-11-18 DIAGNOSIS — G47.00 INSOMNIA, UNSPECIFIED TYPE: ICD-10-CM

## 2024-11-18 PROCEDURE — 3079F DIAST BP 80-89 MM HG: CPT | Performed by: STUDENT IN AN ORGANIZED HEALTH CARE EDUCATION/TRAINING PROGRAM

## 2024-11-18 PROCEDURE — 99214 OFFICE O/P EST MOD 30 MIN: CPT | Performed by: STUDENT IN AN ORGANIZED HEALTH CARE EDUCATION/TRAINING PROGRAM

## 2024-11-18 PROCEDURE — 3075F SYST BP GE 130 - 139MM HG: CPT | Performed by: STUDENT IN AN ORGANIZED HEALTH CARE EDUCATION/TRAINING PROGRAM

## 2024-11-18 ASSESSMENT — ENCOUNTER SYMPTOMS
CHILLS: 0
FEVER: 0
PALPITATIONS: 0
COUGH: 0
HEMOPTYSIS: 0

## 2024-11-18 ASSESSMENT — FIBROSIS 4 INDEX: FIB4 SCORE: 1.38

## 2024-11-19 NOTE — PROGRESS NOTES
Danilo Sierra is a 49 y.o. old male  who is here to establish care     Chief Complaint   Patient presents with    Establish Care     New to You     Other     Reoccurring back pain- go over patient's care team      Patient has neuroforaminal stenosis of Lumbar spine and Right L5 lumbar radiculopathy, received epidural steroid injection in September which improved pain. He takes testosterone injections weekly which is followed by urology     Review of Systems   Constitutional:  Negative for chills and fever.   Respiratory:  Negative for cough and hemoptysis.    Cardiovascular:  Negative for chest pain and palpitations.        Patient  has a past medical history of Allergic rhinitis (01/09/2015), Apnea, sleep, Chickenpox, Daytime sleepiness, Essential hypertension (06/14/2023), Gasping for breath, GERD (gastroesophageal reflux disease), Heartburn, Hyperlipidemia, Influenza, Insomnia (01/13/2012), Mild depression (07/06/2021), Snoring, Subclinical hypothyroidism (05/11/2022), and Wears glasses.    He has no past medical history of Anxiety, Arrhythmia, Asthma, Blood transfusion without reported diagnosis, Cancer (HCC), CHF (congestive heart failure) (HCC), Clotting disorder (HCC), COPD (chronic obstructive pulmonary disease) (HCC), Diabetes, Diabetic neuropathy (HCC), Goiter, Heart attack (HCC), Heart murmur, Kidney disease, Migraine, Pulmonary emphysema (HCC), Seizure (HCC), or Stroke (HCC).  Patient  has a past surgical history that includes dental extraction(s) and block epidural steroid injection (Right, 9/12/2024).  Family History   Problem Relation Age of Onset    Hyperlipidemia Mother     Kidney Disease Mother     Cancer Maternal Grandmother         Lung cancer. (Smoker)    Cancer Maternal Grandfather         Colon cancer    Diabetes Maternal Uncle      Social History     Tobacco Use    Smoking status: Never    Smokeless tobacco: Never   Vaping Use    Vaping status: Never Used   Substance Use Topics     "Alcohol use: Not Currently     Comment: Socially    Drug use: No     Patient Active Problem List    Diagnosis Date Noted    Essential hypertension 06/14/2023    Family history of colon cancer 09/09/2021    Hypothyroidism due to Hashimoto's thyroiditis 09/09/2021    At risk for sleep apnea 07/06/2021    Mild depression 07/06/2021    Restless leg 07/06/2021    Low testosterone in male 07/14/2020    Family history of diabetes mellitus (DM) 07/13/2020    Prediabetes 07/13/2020    Pure hypercholesterolemia 01/08/2016    Gastroesophageal reflux disease without esophagitis 01/08/2016    Allergic rhinitis 01/09/2015    Insomnia 01/13/2012     Current Outpatient Medications   Medication Sig Dispense Refill    gabapentin (NEURONTIN) 300 MG Cap Take 2 Capsules by mouth at bedtime. 90 Capsule 2    losartan (COZAAR) 100 MG Tab Take 1 Tablet by mouth every day. 90 Tablet 3    traZODone (DESYREL) 50 MG Tab TAKE 1 TO 2 TABLETS BY MOUTH AT BEDTIME 180 Tablet 3    atorvastatin (LIPITOR) 40 MG Tab Take 1 Tablet by mouth every day. 90 Tablet 3    levothyroxine (SYNTHROID) 25 MCG Tab Take 1 Tablet by mouth every morning on an empty stomach. 90 Tablet 3    cyclobenzaprine (FLEXERIL) 10 mg Tab TAKE 1 TABLET BY MOUTH 3 TIMES A DAY AS NEEDED FOR MUSCLE SPASMS OR MODERATE PAIN. 90 Tablet 0    omeprazole (PRILOSEC) 40 MG delayed-release capsule Take 1 Capsule by mouth every day. 90 Capsule 3    testosterone cypionate (DEPO-TESTOSTERONE) 200 MG/ML Solution injection       Loratadine (CLARITIN PO) Take  by mouth.       No current facility-administered medications for this visit.    (including changes today)  Allergies: Patient has no known allergies.    /80 (BP Location: Left arm, Patient Position: Sitting, BP Cuff Size: Adult)   Pulse 65   Temp 36.6 °C (97.9 °F) (Temporal)   Ht 1.753 m (5' 9\")   Wt 93.9 kg (207 lb)   SpO2 97%      Physical Exam  Constitutional:       Appearance: Normal appearance.   Cardiovascular:      Rate and " Rhythm: Normal rate and regular rhythm.      Pulses: Normal pulses.      Heart sounds: Normal heart sounds. No murmur heard.  Pulmonary:      Effort: Pulmonary effort is normal. No respiratory distress.      Breath sounds: Normal breath sounds.   Neurological:      General: No focal deficit present.      Mental Status: He is alert and oriented to person, place, and time.          Assessment and plan:    Problem List Items Addressed This Visit          Family Medicine Problems    Essential hypertension     -continue losartan 100 mg daily         Relevant Orders    CBC WITH DIFFERENTIAL    Comp Metabolic Panel       Other    Insomnia     -continue trazodone 50 mg nightly         Pure hypercholesterolemia    Relevant Orders    Lipid Profile    Hypothyroidism due to Hashimoto's thyroiditis     -continue levothyroxine 25 mcg daily         Relevant Orders    TSH     Other Visit Diagnoses       Encounter for preventive care        Relevant Orders    VITAMIN D,25 HYDROXY (DEFICIENCY)                Return in about 6 months (around 5/18/2025) for Labfollowup.          Please note that this dictation was created using voice recognition software. I have made every reasonable attempt to correct obvious errors, but I expect that there are errors of grammar and possibly content that I did not discover before finalizing the note.

## 2024-11-20 ENCOUNTER — APPOINTMENT (OUTPATIENT)
Dept: PHYSICAL MEDICINE AND REHAB | Facility: MEDICAL CENTER | Age: 49
End: 2024-11-20
Payer: COMMERCIAL

## 2024-12-04 ENCOUNTER — OFFICE VISIT (OUTPATIENT)
Dept: URGENT CARE | Facility: CLINIC | Age: 49
End: 2024-12-04
Payer: COMMERCIAL

## 2024-12-04 ENCOUNTER — APPOINTMENT (OUTPATIENT)
Dept: URGENT CARE | Facility: CLINIC | Age: 49
End: 2024-12-04
Payer: COMMERCIAL

## 2024-12-04 VITALS
HEART RATE: 104 BPM | BODY MASS INDEX: 30.54 KG/M2 | HEIGHT: 69 IN | WEIGHT: 206.2 LBS | SYSTOLIC BLOOD PRESSURE: 160 MMHG | OXYGEN SATURATION: 98 % | TEMPERATURE: 99.1 F | RESPIRATION RATE: 14 BRPM | DIASTOLIC BLOOD PRESSURE: 80 MMHG

## 2024-12-04 DIAGNOSIS — M54.41 ACUTE RIGHT-SIDED LOW BACK PAIN WITH RIGHT-SIDED SCIATICA: ICD-10-CM

## 2024-12-04 DIAGNOSIS — I10 ELEVATED BLOOD PRESSURE READING WITH DIAGNOSIS OF HYPERTENSION: ICD-10-CM

## 2024-12-04 PROCEDURE — 99213 OFFICE O/P EST LOW 20 MIN: CPT | Performed by: PHYSICIAN ASSISTANT

## 2024-12-04 PROCEDURE — 3079F DIAST BP 80-89 MM HG: CPT | Performed by: PHYSICIAN ASSISTANT

## 2024-12-04 PROCEDURE — 3077F SYST BP >= 140 MM HG: CPT | Performed by: PHYSICIAN ASSISTANT

## 2024-12-04 RX ORDER — METHYLPREDNISOLONE 4 MG/1
TABLET ORAL
Qty: 21 TABLET | Refills: 0 | Status: SHIPPED | OUTPATIENT
Start: 2024-12-04

## 2024-12-04 RX ORDER — KETOROLAC TROMETHAMINE 15 MG/ML
15 INJECTION, SOLUTION INTRAMUSCULAR; INTRAVENOUS ONCE
Status: COMPLETED | OUTPATIENT
Start: 2024-12-04 | End: 2024-12-04

## 2024-12-04 RX ORDER — CYCLOBENZAPRINE HCL 10 MG
10 TABLET ORAL 3 TIMES DAILY PRN
Qty: 90 TABLET | Refills: 0 | Status: SHIPPED | OUTPATIENT
Start: 2024-12-04

## 2024-12-04 RX ADMIN — KETOROLAC TROMETHAMINE 15 MG: 15 INJECTION, SOLUTION INTRAMUSCULAR; INTRAVENOUS at 14:58

## 2024-12-04 ASSESSMENT — ENCOUNTER SYMPTOMS
TINGLING: 1
PARESTHESIAS: 0
WEAKNESS: 0
LEG PAIN: 1
PERIANAL NUMBNESS: 0
BOWEL INCONTINENCE: 0
NUMBNESS: 0
BACK PAIN: 1
FEVER: 0

## 2024-12-04 ASSESSMENT — FIBROSIS 4 INDEX: FIB4 SCORE: 1.38

## 2024-12-04 NOTE — LETTER
December 4, 2024         Patient: Danilo Sierra   YOB: 1975   Date of Visit: 12/4/2024           To Whom it May Concern:    Danilo Sierra was seen in my clinic on 12/4/2024. Please excuse him from work 12/4, 12/5, and 12/6. He may return to work on 12/9/2024.    If you have any questions or concerns, please don't hesitate to call.        Sincerely,           Jimena Rice P.A.-C.  Electronically Signed

## 2024-12-04 NOTE — PROGRESS NOTES
Subjective     Danilo Sierra is a 49 y.o. male who presents with Back Pain (Flare up a couple years ago he had a bulging disc and is affecting his right leg radiating down the hamstring, tingling. )            Back Pain  This is a new problem. Episode onset: x several months (August 2024) - worse over the past week. The pain is present in the lumbar spine, sacro-iliac and gluteal (The patient states the pain is located to his right lower back with pain extending to the right SI region and right buttocks.). The quality of the pain is described as aching. The pain radiates to the right thigh. The symptoms are aggravated by position (and certain movements). Associated symptoms include leg pain and tingling (The patient reports associated tingling down the back of his right lower extremity to the right foot.). Pertinent negatives include no bladder incontinence, bowel incontinence, fever, numbness, paresthesias, perianal numbness or weakness. He has tried NSAIDs and heat (The patient is also taken Flexeril and gabapentin as prescribed.) for the symptoms.     The patient reports a history of a bulging disc affecting his right lower back.  The patient has seen physiatry in the past for this issue.  The patient states his back pain had improved/resolved for some time.  The patient states in August he developed recurrent back pain.  The patient states he underwent an epidural steroid injection in September.  The patient also recently completed physical therapy.  The patient states over the past week he has developed increased right-sided lower back pain with radiation of pain to the right SI region and right buttock, as well as the back of the right thigh.  The patient is also experiencing tingling of the right foot.  The patient reports no recent injury or trauma to his low back.    PMH:  has a past medical history of Allergic rhinitis (01/09/2015), Apnea, sleep, Chickenpox, Daytime sleepiness, Essential  hypertension (06/14/2023), Gasping for breath, GERD (gastroesophageal reflux disease), Heartburn, Hyperlipidemia, Influenza, Insomnia (01/13/2012), Mild depression (07/06/2021), Snoring, Subclinical hypothyroidism (05/11/2022), and Wears glasses.    He has no past medical history of Anxiety, Arrhythmia, Asthma, Blood transfusion without reported diagnosis, Cancer ([facility]), CHF (congestive heart failure) ([facility]), Clotting disorder ([facility]), COPD (chronic obstructive pulmonary disease) ([facility]), Diabetes, Diabetic neuropathy ([facility]), Goiter, Heart attack ([facility]), Heart murmur, Kidney disease, Migraine, Pulmonary emphysema ([facility]), Seizure ([facility]), or Stroke ([facility]).  MEDS:   Current Outpatient Medications:     gabapentin (NEURONTIN) 300 MG Cap, Take 2 Capsules by mouth at bedtime., Disp: 90 Capsule, Rfl: 2    losartan (COZAAR) 100 MG Tab, Take 1 Tablet by mouth every day., Disp: 90 Tablet, Rfl: 3    traZODone (DESYREL) 50 MG Tab, TAKE 1 TO 2 TABLETS BY MOUTH AT BEDTIME, Disp: 180 Tablet, Rfl: 3    atorvastatin (LIPITOR) 40 MG Tab, Take 1 Tablet by mouth every day., Disp: 90 Tablet, Rfl: 3    levothyroxine (SYNTHROID) 25 MCG Tab, Take 1 Tablet by mouth every morning on an empty stomach., Disp: 90 Tablet, Rfl: 3    cyclobenzaprine (FLEXERIL) 10 mg Tab, TAKE 1 TABLET BY MOUTH 3 TIMES A DAY AS NEEDED FOR MUSCLE SPASMS OR MODERATE PAIN., Disp: 90 Tablet, Rfl: 0    omeprazole (PRILOSEC) 40 MG delayed-release capsule, Take 1 Capsule by mouth every day., Disp: 90 Capsule, Rfl: 3    testosterone cypionate (DEPO-TESTOSTERONE) 200 MG/ML Solution injection, , Disp: , Rfl:     Loratadine (CLARITIN PO), Take  by mouth., Disp: , Rfl:   ALLERGIES: No Known Allergies  SURGHX:   Past Surgical History:   Procedure Laterality Date    BLOCK EPIDURAL STEROID INJECTION Right 9/12/2024    Procedure: RIGHT L5-S1 TRANSFORAMINAL EPIDURAL STEROID INJECTION WITH FLUOROSCOPIC GUIDANCE;  Surgeon: Latoya Haley M.D.;  Location: SURGERY [facility];  Service: Pain  "Management    DENTAL EXTRACTION(S)      [place] Teeth     SOCHX:  reports that he has never smoked. He has never used smokeless tobacco. He reports that he does not currently use alcohol. He reports that he does not use drugs.  FH: Family history was reviewed, no pertinent findings to report      Review of Systems   Constitutional:  Negative for fever.   Gastrointestinal:  Negative for bowel incontinence.   Genitourinary:  Negative for bladder incontinence.   Musculoskeletal:  Positive for back pain.   Neurological:  Positive for tingling (The patient reports associated tingling down the back of his right lower extremity to the right foot.). Negative for weakness, numbness and paresthesias.              Objective     BP (!) 160/80 (BP Location: Left arm, Patient Position: Sitting, BP Cuff Size: Adult)   Pulse (!) 104   Temp 37.3 °C (99.1 °F) (Temporal)   Resp 14   Ht 1.753 m (5' 9\")   Wt 93.5 kg (206 lb 3.2 oz)   SpO2 98%   BMI 30.45 kg/m²      Physical Exam  Constitutional:       General: He is not in acute distress.     Appearance: Normal appearance. He is well-developed. He is not ill-appearing.   HENT:      Head: Normocephalic and atraumatic.      Right Ear: External ear normal.      Left Ear: External ear normal.   Eyes:      Extraocular Movements: Extraocular movements intact.      Conjunctiva/sclera: Conjunctivae normal.   Cardiovascular:      Rate and Rhythm: Normal rate and regular rhythm.      Heart sounds: Normal heart sounds.   Pulmonary:      Effort: Pulmonary effort is normal. No respiratory distress.      Breath sounds: Normal breath sounds. No wheezing.   Musculoskeletal:      Cervical back: Normal range of motion and neck supple.      Comments:   Lumbar Spine:  No tenderness palpation.  No midline/bony tenderness.  No palpable step-off.  No paraspinal muscle tenderness.  No edema.  No ecchymosis.  No overlying erythema.  No increased warmth.  No rashes/lesions.  No external signs of " trauma.  Decreased ROM -the patient demonstrates limited range of motion secondary to pain  Neurovascular intact distally  Strength 5/5 -equal bilateral lower extremities  Normal gait   Skin:     General: Skin is warm and dry.   Neurological:      Mental Status: He is alert and oriented to person, place, and time.                  Progress:  Toradol 15 mg IM given in clinic           Assessment & Plan        Assessment & Plan  Acute right-sided low back pain with right-sided sciatica    Orders:    cyclobenzaprine (FLEXERIL) 10 mg Tab; Take 1 Tablet by mouth 3 times a day as needed for Muscle Spasms or Moderate Pain.    methylPREDNISolone (MEDROL DOSEPAK) 4 MG Tablet Therapy Pack; Follow schedule on package instructions.    ketorolac (Toradol) 15 MG/ML injection 15 mg    Differential diagnoses, supportive care, and indications for immediate follow-up discussed with patient.   Instructed to return to clinic or nearest emergency department for any change in condition, further concerns, or worsening of symptoms.    OTC NSAIDs for pain/discomfort  Alternate ice and heat to the affected area for symptomatic relief  Home stretches as discussed in clinic  Follow-up with Physiatry  Follow-up with PCP as needed  Note provided  Return to clinic or go to the ED if symptoms worsen or fail to improve, or if the patient should develop worsening/increasing back pain, radiation of pain, numbness, tingling, or weakness to the lower extremities, incontinence of bladder/bowel, paresthesias, difficulty walking, fever/chills, and/or any concerning symptoms.     Elevated blood pressure reading with diagnosis of hypertension       The patient's blood pressure was found to be elevated today in clinic.  The patient reports a history of hypertension.  The patient states he has not yet taken his blood pressure medication today as he has been laying in bed due to his low back pain.  Advised the patient to resume his blood pressure medication as  prescribed.  Instructed the patient to monitor his blood pressure and follow-up with primary care as directed.            Discussed plan with the patient, and he agrees to the above.    I personally reviewed prior external notes and test results pertinent to today's visit.  I have independently reviewed and interpreted all diagnostics ordered during this urgent care visit.     Please note that this dictation was created using voice recognition software. I have made every reasonable attempt to correct obvious errors, but I expect that there may be errors of grammar and possibly content that I did not discover before finalizing the note.     This note was electronically signed by Jimena Rice PA-C

## 2024-12-07 ENCOUNTER — OFFICE VISIT (OUTPATIENT)
Dept: URGENT CARE | Facility: CLINIC | Age: 49
End: 2024-12-07
Payer: COMMERCIAL

## 2024-12-07 ENCOUNTER — HOSPITAL ENCOUNTER (EMERGENCY)
Facility: MEDICAL CENTER | Age: 49
End: 2024-12-07
Attending: EMERGENCY MEDICINE
Payer: COMMERCIAL

## 2024-12-07 ENCOUNTER — APPOINTMENT (OUTPATIENT)
Dept: RADIOLOGY | Facility: MEDICAL CENTER | Age: 49
End: 2024-12-07
Attending: EMERGENCY MEDICINE
Payer: COMMERCIAL

## 2024-12-07 ENCOUNTER — PHARMACY VISIT (OUTPATIENT)
Dept: PHARMACY | Facility: MEDICAL CENTER | Age: 49
End: 2024-12-07
Payer: COMMERCIAL

## 2024-12-07 VITALS
BODY MASS INDEX: 30.11 KG/M2 | DIASTOLIC BLOOD PRESSURE: 90 MMHG | RESPIRATION RATE: 18 BRPM | HEART RATE: 87 BPM | TEMPERATURE: 97.7 F | SYSTOLIC BLOOD PRESSURE: 131 MMHG | OXYGEN SATURATION: 95 % | WEIGHT: 203.93 LBS

## 2024-12-07 VITALS
TEMPERATURE: 98.4 F | SYSTOLIC BLOOD PRESSURE: 136 MMHG | BODY MASS INDEX: 30.96 KG/M2 | HEART RATE: 113 BPM | DIASTOLIC BLOOD PRESSURE: 94 MMHG | RESPIRATION RATE: 16 BRPM | WEIGHT: 209 LBS | HEIGHT: 69 IN | OXYGEN SATURATION: 97 %

## 2024-12-07 DIAGNOSIS — M54.41 RIGHT-SIDED LOW BACK PAIN WITH RIGHT-SIDED SCIATICA, UNSPECIFIED CHRONICITY: ICD-10-CM

## 2024-12-07 DIAGNOSIS — R25.1 TREMOR: ICD-10-CM

## 2024-12-07 LAB
ALBUMIN SERPL BCP-MCNC: 4.4 G/DL (ref 3.2–4.9)
ALBUMIN/GLOB SERPL: 1.5 G/DL
ALP SERPL-CCNC: 73 U/L (ref 30–99)
ALT SERPL-CCNC: 56 U/L (ref 2–50)
ANION GAP SERPL CALC-SCNC: 14 MMOL/L (ref 7–16)
AST SERPL-CCNC: 35 U/L (ref 12–45)
BASOPHILS # BLD AUTO: 0.7 % (ref 0–1.8)
BASOPHILS # BLD: 0.06 K/UL (ref 0–0.12)
BILIRUB SERPL-MCNC: 1.3 MG/DL (ref 0.1–1.5)
BUN SERPL-MCNC: 26 MG/DL (ref 8–22)
CALCIUM ALBUM COR SERPL-MCNC: 9.4 MG/DL (ref 8.5–10.5)
CALCIUM SERPL-MCNC: 9.7 MG/DL (ref 8.5–10.5)
CHLORIDE SERPL-SCNC: 103 MMOL/L (ref 96–112)
CO2 SERPL-SCNC: 23 MMOL/L (ref 20–33)
CREAT SERPL-MCNC: 1.15 MG/DL (ref 0.5–1.4)
CRP SERPL HS-MCNC: <0.3 MG/DL (ref 0–0.75)
D DIMER PPP IA.FEU-MCNC: <0.27 UG/ML (FEU) (ref 0–0.5)
EKG IMPRESSION: NORMAL
EOSINOPHIL # BLD AUTO: 0.11 K/UL (ref 0–0.51)
EOSINOPHIL NFR BLD: 1.3 % (ref 0–6.9)
ERYTHROCYTE [DISTWIDTH] IN BLOOD BY AUTOMATED COUNT: 37.2 FL (ref 35.9–50)
ERYTHROCYTE [SEDIMENTATION RATE] IN BLOOD BY WESTERGREN METHOD: 6 MM/HOUR (ref 0–20)
FLUAV RNA SPEC QL NAA+PROBE: NEGATIVE
FLUBV RNA SPEC QL NAA+PROBE: NEGATIVE
GFR SERPLBLD CREATININE-BSD FMLA CKD-EPI: 78 ML/MIN/1.73 M 2
GLOBULIN SER CALC-MCNC: 3 G/DL (ref 1.9–3.5)
GLUCOSE SERPL-MCNC: 106 MG/DL (ref 65–99)
HCT VFR BLD AUTO: 48.3 % (ref 42–52)
HGB BLD-MCNC: 17.2 G/DL (ref 14–18)
IMM GRANULOCYTES # BLD AUTO: 0.02 K/UL (ref 0–0.11)
IMM GRANULOCYTES NFR BLD AUTO: 0.2 % (ref 0–0.9)
LYMPHOCYTES # BLD AUTO: 1.9 K/UL (ref 1–4.8)
LYMPHOCYTES NFR BLD: 22.5 % (ref 22–41)
MAGNESIUM SERPL-MCNC: 2.2 MG/DL (ref 1.5–2.5)
MCH RBC QN AUTO: 30.7 PG (ref 27–33)
MCHC RBC AUTO-ENTMCNC: 35.6 G/DL (ref 32.3–36.5)
MCV RBC AUTO: 86.1 FL (ref 81.4–97.8)
MONOCYTES # BLD AUTO: 0.66 K/UL (ref 0–0.85)
MONOCYTES NFR BLD AUTO: 7.8 % (ref 0–13.4)
NEUTROPHILS # BLD AUTO: 5.71 K/UL (ref 1.82–7.42)
NEUTROPHILS NFR BLD: 67.5 % (ref 44–72)
NRBC # BLD AUTO: 0 K/UL
NRBC BLD-RTO: 0 /100 WBC (ref 0–0.2)
PLATELET # BLD AUTO: 224 K/UL (ref 164–446)
PMV BLD AUTO: 9.2 FL (ref 9–12.9)
POTASSIUM SERPL-SCNC: 4 MMOL/L (ref 3.6–5.5)
PROT SERPL-MCNC: 7.4 G/DL (ref 6–8.2)
RBC # BLD AUTO: 5.61 M/UL (ref 4.7–6.1)
RSV RNA SPEC QL NAA+PROBE: NEGATIVE
SARS-COV-2 RNA RESP QL NAA+PROBE: NOTDETECTED
SODIUM SERPL-SCNC: 140 MMOL/L (ref 135–145)
TSH SERPL DL<=0.005 MIU/L-ACNC: 2.35 UIU/ML (ref 0.38–5.33)
WBC # BLD AUTO: 8.5 K/UL (ref 4.8–10.8)

## 2024-12-07 PROCEDURE — 96374 THER/PROPH/DIAG INJ IV PUSH: CPT

## 2024-12-07 PROCEDURE — 93005 ELECTROCARDIOGRAM TRACING: CPT | Mod: TC | Performed by: EMERGENCY MEDICINE

## 2024-12-07 PROCEDURE — 85025 COMPLETE CBC W/AUTO DIFF WBC: CPT

## 2024-12-07 PROCEDURE — 86140 C-REACTIVE PROTEIN: CPT

## 2024-12-07 PROCEDURE — 84443 ASSAY THYROID STIM HORMONE: CPT

## 2024-12-07 PROCEDURE — 83735 ASSAY OF MAGNESIUM: CPT

## 2024-12-07 PROCEDURE — 99214 OFFICE O/P EST MOD 30 MIN: CPT

## 2024-12-07 PROCEDURE — 93000 ELECTROCARDIOGRAM COMPLETE: CPT

## 2024-12-07 PROCEDURE — 80053 COMPREHEN METABOLIC PANEL: CPT

## 2024-12-07 PROCEDURE — 700111 HCHG RX REV CODE 636 W/ 250 OVERRIDE (IP): Performed by: EMERGENCY MEDICINE

## 2024-12-07 PROCEDURE — 99284 EMERGENCY DEPT VISIT MOD MDM: CPT

## 2024-12-07 PROCEDURE — 96375 TX/PRO/DX INJ NEW DRUG ADDON: CPT

## 2024-12-07 PROCEDURE — 3075F SYST BP GE 130 - 139MM HG: CPT

## 2024-12-07 PROCEDURE — 85379 FIBRIN DEGRADATION QUANT: CPT

## 2024-12-07 PROCEDURE — 71045 X-RAY EXAM CHEST 1 VIEW: CPT

## 2024-12-07 PROCEDURE — 36415 COLL VENOUS BLD VENIPUNCTURE: CPT

## 2024-12-07 PROCEDURE — 93005 ELECTROCARDIOGRAM TRACING: CPT | Mod: TC

## 2024-12-07 PROCEDURE — 0241U HCHG SARS-COV-2 COVID-19 NFCT DS RESP RNA 4 TRGT ED POC: CPT

## 2024-12-07 PROCEDURE — RXMED WILLOW AMBULATORY MEDICATION CHARGE: Performed by: EMERGENCY MEDICINE

## 2024-12-07 PROCEDURE — 85652 RBC SED RATE AUTOMATED: CPT

## 2024-12-07 PROCEDURE — 3080F DIAST BP >= 90 MM HG: CPT

## 2024-12-07 RX ORDER — NAPROXEN 500 MG/1
500 TABLET ORAL 2 TIMES DAILY WITH MEALS
Qty: 60 TABLET | Refills: 0 | Status: SHIPPED | OUTPATIENT
Start: 2024-12-07 | End: 2024-12-07

## 2024-12-07 RX ORDER — METHYLPREDNISOLONE 4 MG/1
TABLET ORAL
Qty: 21 EACH | Refills: 0 | Status: SHIPPED | OUTPATIENT
Start: 2024-12-07 | End: 2024-12-07

## 2024-12-07 RX ORDER — DIAZEPAM 5 MG/1
5 TABLET ORAL EVERY 6 HOURS PRN
Qty: 20 TABLET | Refills: 0 | Status: SHIPPED | OUTPATIENT
Start: 2024-12-07 | End: 2024-12-12

## 2024-12-07 RX ORDER — METHYLPREDNISOLONE 4 MG/1
TABLET ORAL
Qty: 21 EACH | Refills: 0 | Status: SHIPPED | OUTPATIENT
Start: 2024-12-07

## 2024-12-07 RX ORDER — NAPROXEN 500 MG/1
500 TABLET ORAL 2 TIMES DAILY WITH MEALS
Qty: 60 TABLET | Refills: 0 | Status: SHIPPED | OUTPATIENT
Start: 2024-12-07

## 2024-12-07 RX ORDER — DIAZEPAM 5 MG/1
5 TABLET ORAL EVERY 6 HOURS PRN
Qty: 20 TABLET | Refills: 0 | Status: SHIPPED | OUTPATIENT
Start: 2024-12-07 | End: 2024-12-07

## 2024-12-07 RX ORDER — DEXAMETHASONE SODIUM PHOSPHATE 4 MG/ML
8 INJECTION, SOLUTION INTRA-ARTICULAR; INTRALESIONAL; INTRAMUSCULAR; INTRAVENOUS; SOFT TISSUE ONCE
Status: COMPLETED | OUTPATIENT
Start: 2024-12-07 | End: 2024-12-07

## 2024-12-07 RX ORDER — DIAZEPAM 10 MG/2ML
5 INJECTION, SOLUTION INTRAMUSCULAR; INTRAVENOUS ONCE
Status: COMPLETED | OUTPATIENT
Start: 2024-12-07 | End: 2024-12-07

## 2024-12-07 RX ADMIN — DIAZEPAM 5 MG: 10 INJECTION, SOLUTION INTRAMUSCULAR; INTRAVENOUS at 17:30

## 2024-12-07 RX ADMIN — DEXAMETHASONE SODIUM PHOSPHATE 8 MG: 4 INJECTION INTRA-ARTICULAR; INTRALESIONAL; INTRAMUSCULAR; INTRAVENOUS; SOFT TISSUE at 17:30

## 2024-12-07 ASSESSMENT — FIBROSIS 4 INDEX
FIB4 SCORE: 1.38
FIB4 SCORE: 1.38

## 2024-12-07 NOTE — PROGRESS NOTES
Subjective     Danilo Sierra is a 49 y.o. male who presents with Shaking (Upper body shakiness x 2 days)    HPI:   Danilo is a 50yo male presenting for uncontrollable tremor to upper body x2 days. He was recently seen 12/4/24 and treated with Toradol injection, medrol dosepak, and cyclobenzaprine for low back pain. Patient reports resolution of back pain and states he now has been experiencing shaking in his upper body. Denies chest pain. No dizziness, numbness/tingling, or sensation loss. Denies confusion or dysphagia. No vision changes. Denies shortness of breath. No vomiting. Denies ETOH use.       Review of Systems   Constitutional:  Negative for fever and malaise/fatigue.   HENT:  Negative for sore throat.    Eyes:  Negative for blurred vision, double vision, photophobia, pain, discharge and redness.   Respiratory:  Negative for cough, shortness of breath and stridor.    Cardiovascular:  Negative for chest pain, palpitations and leg swelling.   Gastrointestinal:  Negative for abdominal pain, diarrhea, nausea and vomiting.   Musculoskeletal:  Negative for myalgias and neck pain.   Skin:  Negative for itching and rash.   Neurological:  Negative for dizziness, tingling, sensory change, speech change, focal weakness, seizures, loss of consciousness, weakness and headaches.     Past Medical History:   Diagnosis Date    Allergic rhinitis 01/09/2015    Apnea, sleep     Chickenpox     Daytime sleepiness     Essential hypertension 06/14/2023    Gasping for breath     GERD (gastroesophageal reflux disease)     Heartburn     Hyperlipidemia     borderline, no meds    Influenza     Insomnia 01/13/2012    Mild depression 07/06/2021    Snoring     Subclinical hypothyroidism 05/11/2022    Wears glasses      Past Surgical History:   Procedure Laterality Date    BLOCK EPIDURAL STEROID INJECTION Right 9/12/2024    Procedure: RIGHT L5-S1 TRANSFORAMINAL EPIDURAL STEROID INJECTION WITH FLUOROSCOPIC GUIDANCE;   Surgeon: Latoya Haley M.D.;  Location: SURGERY [facility];  Service: Pain Management    DENTAL EXTRACTION(S)      [place] Teeth     Patient has no known allergies.     Current Outpatient Medications:     cyclobenzaprine (FLEXERIL) 10 mg Tab, Take 1 Tablet by mouth 3 times a day as needed for Muscle Spasms or Moderate Pain., Disp: 90 Tablet, Rfl: 0    methylPREDNISolone (MEDROL DOSEPAK) 4 MG Tablet Therapy Pack, Follow schedule on package instructions., Disp: 21 Tablet, Rfl: 0    gabapentin (NEURONTIN) 300 MG Cap, Take 2 Capsules by mouth at bedtime., Disp: 90 Capsule, Rfl: 2    losartan (COZAAR) 100 MG Tab, Take 1 Tablet by mouth every day., Disp: 90 Tablet, Rfl: 3    traZODone (DESYREL) 50 MG Tab, TAKE 1 TO 2 TABLETS BY MOUTH AT BEDTIME, Disp: 180 Tablet, Rfl: 3    atorvastatin (LIPITOR) 40 MG Tab, Take 1 Tablet by mouth every day., Disp: 90 Tablet, Rfl: 3    levothyroxine (SYNTHROID) 25 MCG Tab, Take 1 Tablet by mouth every morning on an empty stomach., Disp: 90 Tablet, Rfl: 3    omeprazole (PRILOSEC) 40 MG delayed-release capsule, Take 1 Capsule by mouth every day., Disp: 90 Capsule, Rfl: 3    testosterone cypionate (DEPO-TESTOSTERONE) 200 MG/ML Solution injection, , Disp: , Rfl:     Loratadine (CLARITIN PO), Take  by mouth., Disp: , Rfl:      Social History     Tobacco Use    Smoking status: Never    Smokeless tobacco: Never   Vaping Use    Vaping status: Never Used   Substance Use Topics    Alcohol use: Not Currently     Comment: Socially    Drug use: No     Family History   Problem Relation Age of Onset    Hyperlipidemia Mother     Kidney Disease Mother     Cancer Maternal Grandmother         Lung cancer. (Smoker)    Cancer Maternal Grandfather         Colon cancer    Diabetes Maternal Uncle      Medications, Allergies, and current problem list reviewed today in Epic.      Objective     BP (!) 136/94 (BP Location: Left arm, Patient Position: Sitting, BP Cuff Size: Adult)   Pulse (!) 113   Temp 36.9 °C  "(98.4 °F) (Temporal)   Resp 16   Ht 1.753 m (5' 9\")   Wt 94.8 kg (209 lb)   SpO2 97%   BMI 30.86 kg/m²      Physical Exam  Vitals reviewed.   Constitutional:       General: He is not in acute distress.  HENT:      Nose: Nose normal.   Eyes:      General: Vision grossly intact. Gaze aligned appropriately. No visual field deficit.     Extraocular Movements: Extraocular movements intact.      Conjunctiva/sclera: Conjunctivae normal.      Pupils: Pupils are equal, round, and reactive to light.   Cardiovascular:      Rate and Rhythm: Normal rate and regular rhythm.      Pulses: Normal pulses.           Radial pulses are 2+ on the right side and 2+ on the left side.      Heart sounds: Normal heart sounds.   Pulmonary:      Effort: Pulmonary effort is normal. No tachypnea, accessory muscle usage, prolonged expiration, respiratory distress or retractions.      Breath sounds: Normal breath sounds. No stridor, decreased air movement or transmitted upper airway sounds.   Chest:      Comments: Tremor noted to anterior chest and bilateral upper extremities.   Musculoskeletal:         General: No swelling, tenderness or deformity. Normal range of motion.      Cervical back: Full passive range of motion without pain, normal range of motion and neck supple. No rigidity or tenderness. Normal range of motion.      Comments: Full ROM BUE.    Skin:     General: Skin is warm and dry.      Capillary Refill: Capillary refill takes less than 2 seconds.      Findings: No bruising, erythema or rash.   Neurological:      General: No focal deficit present.      Mental Status: He is alert. Mental status is at baseline.      Cranial Nerves: Cranial nerves 2-12 are intact. No dysarthria or facial asymmetry.      Sensory: Sensation is intact.      Motor: Motor function is intact. No weakness or abnormal muscle tone.      Coordination: Coordination is intact. Coordination normal. Finger-Nose-Finger Test normal.      Gait: Gait is intact.      " Deep Tendon Reflexes: Reflexes are normal and symmetric.   Psychiatric:         Mood and Affect: Mood normal.         Behavior: Behavior normal.         Thought Content: Thought content normal.       EKG: NSR, normal axis, normal intervals, no evidence of ischemia or hypertrophy.      Assessment & Plan     1. Tremor   - EKG - Clinic Performed       MDM/Comments:   Patient presenting with uncontrollable tremor to upper body. EKG without obvious abnormality. Unable to rule out electrolyte abnormality, emergent cardiac or neurological causes at this time, warranting transfer to higher level of care for further evaluation.    Patient verbalizes understanding and is in agreement with the plan of care.     Differential diagnosis, natural history, supportive care, and indications for immediate follow-up discussed.        Disposition:     Higher level care via private car       I personally reviewed prior external notes and test results pertinent to today's visit.  I have independently reviewed and interpreted all diagnostics ordered during this urgent care visit.                          Electronically signed by DANISHA Alexander

## 2024-12-07 NOTE — ED TRIAGE NOTES
"Chief Complaint   Patient presents with    Sent from Urgent Care     Pt reports that he went to  a couple of days ago, states that they gave him a medication for his back pain (toradol).  Since he developed \"shaking\" in his chest. Pt reports symptoms resolved today after he had an EKG at . Reports that  sent him here for further eval as he was noted to be hypertensive (hx same) and tachycardic.    BP (!) 158/107   Pulse (!) 108   Temp 36.7 °C (98.1 °F) (Temporal)   Resp 16   Wt 92.5 kg (203 lb 14.8 oz)   SpO2 96%   Pt informed of wait times. Educated on triage process.  Asked to return to triage RN for any new or worsening of symptoms. Thanked for patience.      "

## 2024-12-08 NOTE — ED NOTES
Pt given d/c instructions, f/u info and RX x 3 with verbal understanding.  VSS at discharge.  PIV d/c'd with tip intact.  Pt ambulatory from the ED w/ steady gait.  All belongings in possession on discharge.  Pt and family escorted to the elevators by RN.

## 2024-12-08 NOTE — ED PROVIDER NOTES
ED Provider Note    CHIEF COMPLAINT  Chief Complaint   Patient presents with    Sent from Urgent Care       HPI/ROS    OUTSIDE HISTORIAN(S):  Patient's wife is at bedside Andehist review of systems.    Danilo Sierra is a 49 y.o. male who presents stating that he had low back pain and sciatica intermittently several months and has had a flareup last week.  Has been seen in urgent care twice for pain medication from Toradol, prescription for steroids but did not feel that.  He has had slight tremors in his chest only last night and then today as well.  The tremors come and go.  Is not as arms or legs.  He has no eliciting leaving factors with tremors but states he feels anxious when he has to the pain, is not right buttocks going down his leg  Denies back.  No saddle anesthesia, urine bowel incontinence or retention, IV drug use, rash fever.  Patient denies chest pain, nasal congestion, cough, IV drug use, significant alcohol use.    PAST MEDICAL HISTORY   has a past medical history of Allergic rhinitis (01/09/2015), Apnea, sleep, Chickenpox, Daytime sleepiness, Essential hypertension (06/14/2023), Gasping for breath, GERD (gastroesophageal reflux disease), Heartburn, Hyperlipidemia, Influenza, Insomnia (01/13/2012), Mild depression (07/06/2021), Snoring, Subclinical hypothyroidism (05/11/2022), and Wears glasses.    SURGICAL HISTORY   has a past surgical history that includes dental extraction(s) and block epidural steroid injection (Right, 9/12/2024).    FAMILY HISTORY  Family History   Problem Relation Age of Onset    Hyperlipidemia Mother     Kidney Disease Mother     Cancer Maternal Grandmother         Lung cancer. (Smoker)    Cancer Maternal Grandfather         Colon cancer    Diabetes Maternal Uncle        SOCIAL HISTORY  Social History     Tobacco Use    Smoking status: Never    Smokeless tobacco: Never   Vaping Use    Vaping status: Never Used   Substance and Sexual Activity    Alcohol use: Not  Currently     Comment: Socially    Drug use: No    Sexual activity: Yes     Partners: Female     Comment: Living with girlfriend       CURRENT MEDICATIONS  Home Medications       Reviewed by Brooke Lewis R.N. (Registered Nurse) on 12/07/24 at 1504  Med List Status: Partial     Medication Last Dose Status   atorvastatin (LIPITOR) 40 MG Tab  Active   cyclobenzaprine (FLEXERIL) 10 mg Tab  Active   gabapentin (NEURONTIN) 300 MG Cap  Active   levothyroxine (SYNTHROID) 25 MCG Tab  Active   Loratadine (CLARITIN PO)  Active   losartan (COZAAR) 100 MG Tab  Active   methylPREDNISolone (MEDROL DOSEPAK) 4 MG Tablet Therapy Pack  Active   omeprazole (PRILOSEC) 40 MG delayed-release capsule  Active   testosterone cypionate (DEPO-TESTOSTERONE) 200 MG/ML Solution injection  Active   traZODone (DESYREL) 50 MG Tab  Active                    ALLERGIES  No Known Allergies    PHYSICAL EXAM  VITAL SIGNS: BP (!) 131/90   Pulse 87   Temp 36.5 °C (97.7 °F) (Temporal)   Resp 18   Wt 92.5 kg (203 lb 14.8 oz)   SpO2 95%   BMI 30.11 kg/m²      Nursing notes and vitals reviewed.  Constitutional: Well developed, Well nourished, No acute distress, Non-toxic appearance.   Eyes: PERRLA, EOMI, Conjunctiva normal, No discharge.   HENT: Normocephalic, Atraumatic, moist mucous membranes, no facial edema Cardiovascular: Normal heart rate, Normal rhythm, No murmurs, No rubs, No gallops.   Thorax & Lungs: No respiratory distress, No rales, No rhonchi, No wheezing, No chest tenderness.   Abdomen: Bowel sounds normal, Soft, No tenderness, No guarding, No rebound, No masses, No pulsatile masses.   Skin: Warm, Dry, No erythema, No rash.   Extremities: No deformity, no edema, good range of motion range of motion upper lower extremes bilaterally  Musculoskeletal: No CVA tenderness, slight right paravertebral muscle spasm, slight piriformis tenderness in the right  Neurologic: Alert & oriented x 3, no saddle anesthesia, leg lift 5/5 bilaterally,  plantarflexion dorsiflexion 5/5 bilaterally, DTRs are 2/4 bilaterally   Psychiatric: Anxious affect      EKG/LABS  Results for orders placed or performed during the hospital encounter of 24   EKG    Collection Time: 24  3:10 PM   Result Value Ref Range    Report       [facility] Emergency Dept.    Test Date:  2024  Pt Name:    DAKOTA MACHADO           Department: ER  MRN:        [id number]                      Room:       [facility]  Gender:     Male                         Technician: [id number]  :        1975                   Requested By:ER TRIAGE PROTOCOL  Order #:    [id number]                    Reading MD: MELO DAWSON DO    Measurements  Intervals                                Axis  Rate:       94                           P:          44  RI:         197                          QRS:        6  QRSD:       104                          T:          31  QT:         337  QTc:        422    Interpretive Statements  Sinus rhythm  Borderline prolonged RI interval  No previous ECG available for comparison  Electronically Signed On 2024 17:14:32 PST by MELO DAWSON DO     CBC WITH DIFFERENTIAL    Collection Time: 24  5:09 PM   Result Value Ref Range    WBC 8.5 4.8 - 10.8 K/uL    RBC 5.61 4.70 - 6.10 M/uL    Hemoglobin 17.2 14.0 - 18.0 g/dL    Hematocrit 48.3 42.0 - 52.0 %    MCV 86.1 81.4 - 97.8 fL    MCH 30.7 27.0 - 33.0 pg    MCHC 35.6 32.3 - 36.5 g/dL    RDW 37.2 35.9 - 50.0 fL    Platelet Count 224 164 - 446 K/uL    MPV 9.2 9.0 - 12.9 fL    Neutrophils-Polys 67.50 44.00 - 72.00 %    Lymphocytes 22.50 22.00 - 41.00 %    Monocytes 7.80 0.00 - 13.40 %    Eosinophils 1.30 0.00 - 6.90 %    Basophils 0.70 0.00 - 1.80 %    Immature Granulocytes 0.20 0.00 - 0.90 %    Nucleated RBC 0.00 0.00 - 0.20 /100 WBC    Neutrophils (Absolute) 5.71 1.82 - 7.42 K/uL    Lymphs (Absolute) 1.90 1.00 - 4.80 K/uL    Monos (Absolute) 0.66 0.00 - 0.85 K/uL    Eos (Absolute)  0.11 0.00 - 0.51 K/uL    Baso (Absolute) 0.06 0.00 - 0.12 K/uL    Immature Granulocytes (abs) 0.02 0.00 - 0.11 K/uL    NRBC (Absolute) 0.00 K/uL   COMP METABOLIC PANEL    Collection Time: 12/07/24  5:09 PM   Result Value Ref Range    Sodium 140 135 - 145 mmol/L    Potassium 4.0 3.6 - 5.5 mmol/L    Chloride 103 96 - 112 mmol/L    Co2 23 20 - 33 mmol/L    Anion Gap 14.0 7.0 - 16.0    Glucose 106 (H) 65 - 99 mg/dL    Bun 26 (H) 8 - 22 mg/dL    Creatinine 1.15 0.50 - 1.40 mg/dL    Calcium 9.7 8.5 - 10.5 mg/dL    Correct Calcium 9.4 8.5 - 10.5 mg/dL    AST(SGOT) 35 12 - 45 U/L    ALT(SGPT) 56 (H) 2 - 50 U/L    Alkaline Phosphatase 73 30 - 99 U/L    Total Bilirubin 1.3 0.1 - 1.5 mg/dL    Albumin 4.4 3.2 - 4.9 g/dL    Total Protein 7.4 6.0 - 8.2 g/dL    Globulin 3.0 1.9 - 3.5 g/dL    A-G Ratio 1.5 g/dL   MAGNESIUM    Collection Time: 12/07/24  5:09 PM   Result Value Ref Range    Magnesium 2.2 1.5 - 2.5 mg/dL   Sed Rate    Collection Time: 12/07/24  5:09 PM   Result Value Ref Range    Sed Rate Westergren 6 0 - 20 mm/hour   CRP QUANTITIVE (NON-CARDIAC)    Collection Time: 12/07/24  5:09 PM   Result Value Ref Range    Stat C-Reactive Protein <0.30 0.00 - 0.75 mg/dL   D-DIMER    Collection Time: 12/07/24  5:09 PM   Result Value Ref Range    D-Dimer <0.27 0.00 - 0.50 ug/mL (FEU)   TSH WITH REFLEX TO FT4    Collection Time: 12/07/24  5:09 PM   Result Value Ref Range    TSH 2.350 0.380 - 5.330 uIU/mL   ESTIMATED GFR    Collection Time: 12/07/24  5:09 PM   Result Value Ref Range    GFR (CKD-EPI) 78 >60 mL/min/1.73 m 2   POC CoV-2, FLU A/B, RSV by PCR    Collection Time: 12/07/24  5:51 PM   Result Value Ref Range    POC Influenza A RNA, PCR Negative Negative    POC Influenza B RNA, PCR Negative Negative    POC RSV, by PCR Negative Negative    POC SARS-CoV-2, PCR NotDetected NotDetected       I have independently interpreted this EKG    RADIOLOGY/PROCEDURES   I have independently interpreted the diagnostic imaging associated with  this visit and am waiting the final reading from the radiologist.   My preliminary interpretation is as follows: Chest x-ray is negative for infiltrate  Radiologist interpretation:  DX-CHEST-PORTABLE (1 VIEW)   Final Result      No acute cardiopulmonary abnormality.          COURSE & MEDICAL DECISION MAKING    ASSESSMENT, COURSE AND PLAN  Care Narrative: This is a pleasant 49-year-old male presents with tremors and low back pain.  As for the low back pain, the patient has no focal neurological deficits, no clinical historical complaints consistent with epidural abscess, cauda equina syndrome.  In addition, does not leukocytosis, is afebrile, with amatory is a marker and do not believe discitis, osteomyelitis and his presentation of pain as well as lack of infectious markers.  Patient received Valium here in the emerged part as well as Decadron as significant improvement.  He had no tremors at all when he was here.  He states she is as chest tremors at times.  D-dimer is negative for possible pulmonary embolism, patient has a negative EKG suspicious for arrhythmia and do not believe he has a cardiac etiology.  TSH is normal excluding hyperthyroidism as well.  The patient was reevaluated and is doing fine.  He may have anxiety causing his symptoms but he notably has overwhelming infection such as discitis, osteomyelitis, epidural abscess nor do believe the patient requires MRI.  The patient received a prescription for Valium as well as Medrol Dosepak and container with anti-inflammatories and we follow his primary care physician for further evaluation.        DISPOSITION AND DISCUSSIONS      Decision tools and prescription drugs considered including, but not limited to: Consider MRI of the lumbar spine with contrast with the patient does not have evidence of cauda equina syndrome, no plantar markers, afebrile does not have clinical or historical complaint consistent with epidural abscess or epidural hematoma.    FINAL  DIAGNOSIS  1. Right-sided low back pain with right-sided sciatica, unspecified chronicity    2. Tremor        DISPOSITION:  Patient will be discharged home in stable condition.    FOLLOW UP:  [facility], Emergency Dept  [address]  [place] [address] [phone]    If symptoms worsen    Linette Moscoso M.D.  [address]  [address]  [place] [id number] [phone]    Schedule an appointment as soon as possible for a visit   As needed      OUTPATIENT MEDICATIONS:  Discharge Medication List as of 12/7/2024  7:29 PM        START taking these medications    Details   diazePAM (VALIUM) 5 MG Tab Take 1 Tablet by mouth every 6 hours as needed (pain) for up to 20 doses. No driving, no drinking alcohol, Disp-20 Tablet, R-0, Print Rx Paper      !! methylPREDNISolone (MEDROL DOSEPAK) 4 MG Tablet Therapy Pack Use as directed, Disp-1 Each, R-0, Normal      naproxen (NAPROSYN) 500 MG Tab Take 1 Tablet by mouth 2 times a day with meals., Disp-60 Tablet, R-0, Normal       !! - Potential duplicate medications found. Please discuss with provider.          Electronically signed by: Gene Savage D.O., 12/7/2024 4:17 PM

## 2024-12-08 NOTE — ED NOTES
Bedside report received from off going RN/tech: Carmelina RENAE, assumed care of patient.  POC discussed with patient. Call light within reach, all needs addressed at this time.       Fall risk interventions in place: Not Applicable (all applicable per [place] Fall risk assessment)   Continuous monitoring: Not Applicable   IVF/IV medications: Not Applicable   Oxygen: Room Air  Bedside sitter: Not Applicable   Isolation: Not Applicable

## 2024-12-08 NOTE — ED NOTES
Pt resting comfortably in [facility]. Breathing unlabored/equal chest rise. Bed in the lowest position, side rails up, Call light within reach. Updated on POC. Denies any needs at this time.

## 2024-12-08 NOTE — DISCHARGE INSTRUCTIONS
Please follow with your primary care physician for your blood pressure.  In addition, return the emergency department loss of sensation or strength to arms or legs, urine bowel incontinence or retention, saddle anesthesia.    No heavy lifting or bending for 3 days.  Return to [facility] emergency department for fever, leg weakness (inability to walk or bear weight), loss of bowel or bladder control, loss of feeling between your legs or any new symptoms.      Back Pain (Lumbosacral Strain)  Back pain is one of the most common causes of pain. There are many causes of back pain. Most are not serious conditions.    CAUSES  Your backbone (spinal column) is made up of 24 main vertebral bodies, the sacrum, and the coccyx. These are held together by muscles and tough, fibrous tissue (ligaments). Nerve roots pass through the openings between the vertebrae. A sudden move or injury to the back may cause injury to, or pressure on, these nerves. This may result in localized back pain or pain movement (radiation) into the buttocks, down the leg, and into the foot. Sharp, shooting pain from the buttock down the back of the leg (sciatica) is frequently associated with a ruptured (herniated) disc. Pain may be caused by muscle spasm alone.  Your caregiver can often find the cause of your pain by the details of your symptoms and an exam. In some cases, you may need tests (such as X-rays). Your caregiver will work with you to decide if any tests are needed based on your specific exam.  HOME CARE INSTRUCTIONS  Avoid an underactive lifestyle. Active exercise, as directed by your caregiver, is your greatest weapon against back pain.   Avoid hard physical activities (tennis, racquetball, water-skiing) if you are not in proper physical condition for it. This may aggravate and/or create problems.   If you have a back problem, avoid sports requiring sudden body movements. Swimming and walking are generally safer  activities.   Maintain good posture.   Avoid becoming overweight (obese).   Use bed rest for only the most extreme, sudden (acute) episode. Your caregiver will help you determine how much bed rest is necessary.   For acute conditions, you may put ice on the injured area.   Put ice in a plastic bag.   Place a towel between your skin and the bag.   Leave the ice on for 30 minutes at a time, every 2 hours, or as needed.   After you are improved and more active, it may help to apply heat for 30 minutes before activities.   See your caregiver if you are having pain that lasts longer than expected. Your caregiver can advise appropriate exercises and/or therapy if needed. With conditioning, most back problems can be avoided.  SEEK IMMEDIATE MEDICAL CARE IF:  You have numbness, tingling, weakness, or problems with the use of your arms or legs.   You experience severe back pain not relieved with medicines.   There is a change in bowel or bladder control.   You have increasing pain in any area of the body, including your belly (abdomen).   You notice shortness of breath, dizziness, or feel faint.   You feel sick to your stomach (nauseous), are throwing up (vomiting), or become sweaty.   You notice discoloration of your toes or legs, or your feet get very cold.   Your back pain is getting worse.   You have an oral temperature above 102° F (38.9° C), not controlled by medicine.   MAKE SURE YOU:   Understand these instructions.   Will watch your condition.   Will get help right away if you are not doing well or get worse.   Document Released: 03/14/2011   ExitCare® Patient Information ©2011 [employer].  Back Exercises  Back exercises help treat and prevent back injuries. The goal of back exercises is to increase the strength of your abdominal and back muscles and the flexibility of your back. These exercises should be started when you no longer have back pain. Back exercises include:  Pelvic Tilt - Lie on your back with your  knees bent. Tilt your pelvis until the lower part of your back is against the floor. Hold this position 5-10 sec and repeat 5-10 times.   Knee to Chest - Pull first one knee up against your chest and hold for 20-30 seconds, repeat this with the other knee, and then both knees. This may be done with the other leg straight or bent, whichever feels better.   Sit-Ups or Curl-Ups - Bend your knees 90 degrees. Start with tilting your pelvis, and do a partial, slow sit-up, lifting your trunk only 30-45 degrees off the floor. Take at least 2-3 sec for each sit-up. Do not do sit-ups with your knees out straight. If partial sit-ups are difficult, simply do the above but with only tightening your abdominal muscles and holding it as directed.   Hip-Lift - Lie on your back with your knees flexed 90 degrees. Push down with your feet and shoulders as you raise your hips a couple inches off the floor; hold for 10 sec, repeat 5-10 times.   Back arches - Lie on your stomach, propping yourself up on bent elbows. Slowly press on your hands, causing an arch in your low back. Repeat 3-5 times. Any initial stiffness and discomfort should lessen with repetition over time.   Shoulder-Lifts - Lie face down with arms beside your body. Keep hips and torso pressed to floor as you slowly lift your head and shoulders off the floor.   Do not overdo your exercises, especially in the beginning. Exercises may cause you some mild back discomfort which lasts for a few minutes; however, if the pain is more severe, or lasts for more than 15 minutes, do not continue exercises until you see your caregiver. Improvement with exercise therapy for back problems is slow.   See your caregivers for assistance with developing a proper back exercise program.  Document Released: 01/25/2006 Document Re-Released: 03/16/2010  ExitCare® Patient Information ©2011 [employer].

## 2024-12-10 ASSESSMENT — ENCOUNTER SYMPTOMS
MYALGIAS: 0
PHOTOPHOBIA: 0
EYE REDNESS: 0
PALPITATIONS: 0
COUGH: 0
NECK PAIN: 0
EYE DISCHARGE: 0
DIARRHEA: 0
BLURRED VISION: 0
VOMITING: 0
EYE PAIN: 0
FOCAL WEAKNESS: 0
HEADACHES: 0
WEAKNESS: 0
SENSORY CHANGE: 0
STRIDOR: 0
SORE THROAT: 0
SHORTNESS OF BREATH: 0
NAUSEA: 0
ABDOMINAL PAIN: 0
DIZZINESS: 0
FEVER: 0
DOUBLE VISION: 0
LOSS OF CONSCIOUSNESS: 0
SPEECH CHANGE: 0
SEIZURES: 0
TINGLING: 0

## 2024-12-10 ASSESSMENT — VISUAL ACUITY: OU: 1

## 2025-01-05 ENCOUNTER — HOSPITAL ENCOUNTER (OUTPATIENT)
Dept: RADIOLOGY | Facility: MEDICAL CENTER | Age: 50
End: 2025-01-05
Attending: PHYSICAL MEDICINE & REHABILITATION
Payer: COMMERCIAL

## 2025-01-05 DIAGNOSIS — M54.12 CERVICAL RADICULOPATHY: ICD-10-CM

## 2025-01-05 PROCEDURE — 72141 MRI NECK SPINE W/O DYE: CPT

## 2025-01-11 DIAGNOSIS — I10 ESSENTIAL HYPERTENSION: ICD-10-CM

## 2025-01-13 RX ORDER — LOSARTAN POTASSIUM 100 MG/1
100 TABLET ORAL DAILY
Qty: 90 TABLET | Refills: 3 | Status: SHIPPED | OUTPATIENT
Start: 2025-01-13

## 2025-04-03 DIAGNOSIS — E78.5 HYPERLIPIDEMIA, UNSPECIFIED HYPERLIPIDEMIA TYPE: ICD-10-CM

## 2025-04-03 RX ORDER — ATORVASTATIN CALCIUM 40 MG/1
40 TABLET, FILM COATED ORAL DAILY
Qty: 90 TABLET | Refills: 3 | Status: SHIPPED | OUTPATIENT
Start: 2025-04-03

## 2025-04-08 ENCOUNTER — APPOINTMENT (OUTPATIENT)
Dept: PHYSICAL MEDICINE AND REHAB | Facility: MEDICAL CENTER | Age: 50
End: 2025-04-08
Payer: COMMERCIAL

## 2025-05-16 ENCOUNTER — HOSPITAL ENCOUNTER (OUTPATIENT)
Dept: LAB | Facility: MEDICAL CENTER | Age: 50
End: 2025-05-16
Attending: STUDENT IN AN ORGANIZED HEALTH CARE EDUCATION/TRAINING PROGRAM
Payer: COMMERCIAL

## 2025-05-16 DIAGNOSIS — E06.3 HYPOTHYROIDISM DUE TO HASHIMOTO'S THYROIDITIS: ICD-10-CM

## 2025-05-16 DIAGNOSIS — Z00.00 ENCOUNTER FOR PREVENTIVE CARE: ICD-10-CM

## 2025-05-16 DIAGNOSIS — E78.00 PURE HYPERCHOLESTEROLEMIA: ICD-10-CM

## 2025-05-16 DIAGNOSIS — I10 ESSENTIAL HYPERTENSION: ICD-10-CM

## 2025-05-16 LAB
25(OH)D3 SERPL-MCNC: 22 NG/ML (ref 30–100)
ALBUMIN SERPL BCP-MCNC: 4.5 G/DL (ref 3.2–4.9)
ALBUMIN/GLOB SERPL: 1.6 G/DL
ALP SERPL-CCNC: 64 U/L (ref 30–99)
ALT SERPL-CCNC: 62 U/L (ref 2–50)
ANION GAP SERPL CALC-SCNC: 7 MMOL/L (ref 7–16)
AST SERPL-CCNC: 67 U/L (ref 12–45)
BASOPHILS # BLD AUTO: 1 % (ref 0–1.8)
BASOPHILS # BLD: 0.07 K/UL (ref 0–0.12)
BILIRUB SERPL-MCNC: 1.6 MG/DL (ref 0.1–1.5)
BUN SERPL-MCNC: 22 MG/DL (ref 8–22)
CALCIUM ALBUM COR SERPL-MCNC: 9 MG/DL (ref 8.5–10.5)
CALCIUM SERPL-MCNC: 9.4 MG/DL (ref 8.5–10.5)
CHLORIDE SERPL-SCNC: 100 MMOL/L (ref 96–112)
CHOLEST SERPL-MCNC: 162 MG/DL (ref 100–199)
CO2 SERPL-SCNC: 28 MMOL/L (ref 20–33)
CREAT SERPL-MCNC: 1.14 MG/DL (ref 0.5–1.4)
EOSINOPHIL # BLD AUTO: 0.24 K/UL (ref 0–0.51)
EOSINOPHIL NFR BLD: 3.4 % (ref 0–6.9)
ERYTHROCYTE [DISTWIDTH] IN BLOOD BY AUTOMATED COUNT: 45.3 FL (ref 35.9–50)
FASTING STATUS PATIENT QL REPORTED: NORMAL
GFR SERPLBLD CREATININE-BSD FMLA CKD-EPI: 78 ML/MIN/1.73 M 2
GLOBULIN SER CALC-MCNC: 2.9 G/DL (ref 1.9–3.5)
GLUCOSE SERPL-MCNC: 96 MG/DL (ref 65–99)
HCT VFR BLD AUTO: 57.9 % (ref 42–52)
HDLC SERPL-MCNC: 24 MG/DL
HGB BLD-MCNC: 18.8 G/DL (ref 14–18)
IMM GRANULOCYTES # BLD AUTO: 0.03 K/UL (ref 0–0.11)
IMM GRANULOCYTES NFR BLD AUTO: 0.4 % (ref 0–0.9)
LDLC SERPL CALC-MCNC: 118 MG/DL
LYMPHOCYTES # BLD AUTO: 1.93 K/UL (ref 1–4.8)
LYMPHOCYTES NFR BLD: 27.3 % (ref 22–41)
MCH RBC QN AUTO: 30.1 PG (ref 27–33)
MCHC RBC AUTO-ENTMCNC: 32.5 G/DL (ref 32.3–36.5)
MCV RBC AUTO: 92.8 FL (ref 81.4–97.8)
MONOCYTES # BLD AUTO: 0.91 K/UL (ref 0–0.85)
MONOCYTES NFR BLD AUTO: 12.9 % (ref 0–13.4)
NEUTROPHILS # BLD AUTO: 3.88 K/UL (ref 1.82–7.42)
NEUTROPHILS NFR BLD: 55 % (ref 44–72)
NRBC # BLD AUTO: 0 K/UL
NRBC BLD-RTO: 0 /100 WBC (ref 0–0.2)
PLATELET # BLD AUTO: 297 K/UL (ref 164–446)
PMV BLD AUTO: 9.7 FL (ref 9–12.9)
POTASSIUM SERPL-SCNC: 4.8 MMOL/L (ref 3.6–5.5)
PROT SERPL-MCNC: 7.4 G/DL (ref 6–8.2)
RBC # BLD AUTO: 6.24 M/UL (ref 4.7–6.1)
SODIUM SERPL-SCNC: 135 MMOL/L (ref 135–145)
TRIGL SERPL-MCNC: 100 MG/DL (ref 0–149)
TSH SERPL-ACNC: 0.16 UIU/ML (ref 0.38–5.33)
WBC # BLD AUTO: 7.1 K/UL (ref 4.8–10.8)

## 2025-05-16 PROCEDURE — 82306 VITAMIN D 25 HYDROXY: CPT

## 2025-05-16 PROCEDURE — 84443 ASSAY THYROID STIM HORMONE: CPT

## 2025-05-16 PROCEDURE — 80053 COMPREHEN METABOLIC PANEL: CPT

## 2025-05-16 PROCEDURE — 80061 LIPID PANEL: CPT

## 2025-05-16 PROCEDURE — 36415 COLL VENOUS BLD VENIPUNCTURE: CPT

## 2025-05-16 PROCEDURE — 85025 COMPLETE CBC W/AUTO DIFF WBC: CPT

## 2025-05-19 ENCOUNTER — OFFICE VISIT (OUTPATIENT)
Dept: MEDICAL GROUP | Facility: MEDICAL CENTER | Age: 50
End: 2025-05-19
Payer: COMMERCIAL

## 2025-05-19 VITALS
OXYGEN SATURATION: 97 % | BODY MASS INDEX: 31.25 KG/M2 | HEIGHT: 70 IN | SYSTOLIC BLOOD PRESSURE: 152 MMHG | HEART RATE: 85 BPM | WEIGHT: 218.26 LBS | TEMPERATURE: 98.8 F | DIASTOLIC BLOOD PRESSURE: 82 MMHG

## 2025-05-19 DIAGNOSIS — I10 ESSENTIAL HYPERTENSION: ICD-10-CM

## 2025-05-19 DIAGNOSIS — E55.9 VITAMIN D INSUFFICIENCY: ICD-10-CM

## 2025-05-19 DIAGNOSIS — R74.8 ELEVATED LIVER ENZYMES: ICD-10-CM

## 2025-05-19 DIAGNOSIS — R21 RASH: ICD-10-CM

## 2025-05-19 DIAGNOSIS — D75.1 ERYTHROCYTOSIS: ICD-10-CM

## 2025-05-19 DIAGNOSIS — R76.8 ANTI-TPO ANTIBODIES PRESENT: ICD-10-CM

## 2025-05-19 DIAGNOSIS — G47.00 INSOMNIA, UNSPECIFIED TYPE: ICD-10-CM

## 2025-05-19 DIAGNOSIS — E06.3 HYPOTHYROIDISM DUE TO HASHIMOTO'S THYROIDITIS: Primary | ICD-10-CM

## 2025-05-19 PROCEDURE — 3077F SYST BP >= 140 MM HG: CPT | Performed by: STUDENT IN AN ORGANIZED HEALTH CARE EDUCATION/TRAINING PROGRAM

## 2025-05-19 PROCEDURE — 99214 OFFICE O/P EST MOD 30 MIN: CPT | Performed by: STUDENT IN AN ORGANIZED HEALTH CARE EDUCATION/TRAINING PROGRAM

## 2025-05-19 PROCEDURE — 3079F DIAST BP 80-89 MM HG: CPT | Performed by: STUDENT IN AN ORGANIZED HEALTH CARE EDUCATION/TRAINING PROGRAM

## 2025-05-19 RX ORDER — LEVOTHYROXINE SODIUM 25 UG/1
25 TABLET ORAL
Qty: 25 TABLET | Refills: 0 | Status: SHIPPED | OUTPATIENT
Start: 2025-05-19 | End: 2025-07-08

## 2025-05-19 RX ORDER — HYDROCHLOROTHIAZIDE 12.5 MG/1
12.5 TABLET ORAL DAILY
Qty: 60 TABLET | Refills: 0 | Status: SHIPPED | OUTPATIENT
Start: 2025-05-19 | End: 2025-07-18

## 2025-05-19 RX ORDER — BENZOCAINE/MENTHOL 6 MG-10 MG
1 LOZENGE MUCOUS MEMBRANE 2 TIMES DAILY
Qty: 1 G | Refills: 0 | Status: SHIPPED | OUTPATIENT
Start: 2025-05-19 | End: 2025-05-26

## 2025-05-19 RX ORDER — TRAZODONE HYDROCHLORIDE 50 MG/1
50-100 TABLET ORAL
Qty: 180 TABLET | Refills: 3 | Status: SHIPPED | OUTPATIENT
Start: 2025-05-19

## 2025-05-19 ASSESSMENT — FIBROSIS 4 INDEX: FIB4 SCORE: 1.43

## 2025-05-19 ASSESSMENT — PATIENT HEALTH QUESTIONNAIRE - PHQ9: CLINICAL INTERPRETATION OF PHQ2 SCORE: 0

## 2025-05-19 NOTE — ASSESSMENT & PLAN NOTE
- Unstable  - Patient takes levothyroxine as directed.  Denies symptoms of hyperthyroidism  - Plan to decrease levothyroxine 25 mcg to every 48 hours.  - Follow-up TSH in 6 weeks

## 2025-05-19 NOTE — PROGRESS NOTES
Verbal consent was acquired by the patient to use [employer] ambient listening note generation during this visit     Subjective:     HPI:   History of Present Illness  The patient presents for lab follow up  He has been on a regimen of levothyroxine 25 mcg for the past 2 years, which he administers first thing in the morning on an empty stomach. He typically wakes up around 4:30 to 5:00 AM, takes his medication, and then consumes his other medications with food around 8:00 AM. He reports no palpitations or increased perspiration. He has exhausted his supply of levothyroxine as of 05/16/2025.     He reports experiencing dry skin on his hands, which he attributes to his use of Claritin. He does not utilize a humidifier at home. He has observed the formation of small bumps on his hands which has resolved but reports no associated itching. He has no known history of eczema. The application of lotion appears to alleviate the dryness for the majority of the day.   He is currently on rosuvastatin for cholesterol management.    He is not currently taking any vitamin D supplements.    He reports no right-sided pain. He admits to occasional alcohol consumption during social outings but otherwise abstains. He reports no chest pain or palpitations.    He has been on losartan 100 mg for approximately 2 years, which he typically takes around 8:00 AM. He did not take his dose today, with his last dose being yesterday morning. He does not monitor his blood pressure at home. He experienced a headache a few days ago, but it has since resolved. He reports no leg swelling or history of gout. He recalls a previous episode where his blood pressure was elevated at urgent care but normalized upon arrival at the emergency room.    He has run out of trazodone, which he reports was beneficial in aiding his sleep. He is requesting a refill of this medication.    SOCIAL HISTORY  He does not smoke. He drinks alcohol occasionally when going  "out.      Objective:     Exam:  BP (!) 152/82 (BP Location: Left arm, Patient Position: Sitting, BP Cuff Size: Adult)   Pulse 85   Temp 37.1 °C (98.8 °F) (Temporal)   Ht 1.765 m (5' 9.5\")   Wt 99 kg (218 lb 4.1 oz)   SpO2 97%   BMI 31.77 kg/m²  Body mass index is 31.77 kg/m².    ROS as above  Physical Exam  Constitutional:       Appearance: Normal appearance.   Cardiovascular:      Rate and Rhythm: Normal rate and regular rhythm.      Pulses: Normal pulses.      Heart sounds: Normal heart sounds. No murmur heard.  Pulmonary:      Effort: Pulmonary effort is normal. No respiratory distress.      Breath sounds: Normal breath sounds. No wheezing.   Neurological:      General: No focal deficit present.      Mental Status: He is alert and oriented to person, place, and time.        Latest Reference Range & Units 05/16/25 08:01   Cholesterol,Tot 100 - 199 mg/dL 162   Triglycerides 0 - 149 mg/dL 100   HDL >=40 mg/dL 24 !   LDL <100 mg/dL 118 (H)   !: Data is abnormal  (H): Data is abnormally high     Latest Reference Range & Units 05/16/25 08:01   Cholesterol,Tot 100 - 199 mg/dL 162   Triglycerides 0 - 149 mg/dL 100   HDL >=40 mg/dL 24 !   LDL <100 mg/dL 118 (H)   !: Data is abnormal  (H): Data is abnormally high     Latest Reference Range & Units 05/16/25 08:01   TSH 0.380 - 5.330 uIU/mL 0.163 (L)   (L): Data is abnormally low  Assessment & Plan:     Assessment & Plan      Problem List Items Addressed This Visit          Family Medicine Problems    Essential hypertension    - Unstable  - Continue losartan 100 mg daily and add hydrochlorothiazide 12.5 mg daily         Relevant Medications    hydroCHLOROthiazide 12.5 MG tablet    Vitamin D insufficiency    - Recommend over-the-counter vitamin D supplements            Other    Insomnia    Relevant Medications    traZODone (DESYREL) 50 MG Tab    Hypothyroidism due to Hashimoto's thyroiditis - Primary    - Unstable  - Patient takes levothyroxine as directed.  Denies " symptoms of hyperthyroidism  - Plan to decrease levothyroxine 25 mcg to every 48 hours.  - Follow-up TSH in 6 weeks         Relevant Medications    levothyroxine (SYNTHROID) 25 MCG Tab    Other Relevant Orders    TSH    FREE THYROXINE    Rash    Patient has a rash on the distal aspect of the hand with xerosis and small papular lesions.  Papular lesions improving  - The use of a humidifier during  is recommended.  - Hydrocortisone cream 1% will be prescribed to be used when bumps are present, and not for more than 14 consecutive days          Other Visit Diagnoses         Anti-TPO antibodies present        Relevant Medications    levothyroxine (SYNTHROID) 25 MCG Tab      Erythrocytosis        Relevant Orders    CBC WITH DIFFERENTIAL      Elevated liver enzymes        Relevant Orders    Comp Metabolic Panel                Return in about 2 months (around 7/19/2025) for Thyroid, hypertension.    Please note that this dictation was created using voice recognition software. I have made every reasonable attempt to correct obvious errors, but I expect that there are errors of grammar and possibly content that I did not discover before finalizing the note.

## 2025-05-19 NOTE — ASSESSMENT & PLAN NOTE
Patient has a rash on the distal aspect of the hand with xerosis and small papular lesions.  Papular lesions improving  - The use of a humidifier during  is recommended.  - Hydrocortisone cream 1% will be prescribed to be used when bumps are present, and not for more than 14 consecutive days

## 2025-06-16 DIAGNOSIS — I10 ESSENTIAL HYPERTENSION: ICD-10-CM

## 2025-06-16 RX ORDER — HYDROCHLOROTHIAZIDE 12.5 MG/1
12.5 TABLET ORAL DAILY
Qty: 180 TABLET | Refills: 0 | Status: SHIPPED | OUTPATIENT
Start: 2025-06-16 | End: 2025-12-13

## 2025-06-16 NOTE — TELEPHONE ENCOUNTER
Received request via: Pharmacy    Was the patient seen in the last year in this department? Yes    Does the patient have an active prescription (recently filled or refills available) for medication(s) requested? No    [facility]     Does the patient have intermediate Plus and need 100-day supply? (This applies to ALL medications) Patient does not have SCP    Physical fax scanned into patient chart

## 2025-06-30 ENCOUNTER — HOSPITAL ENCOUNTER (OUTPATIENT)
Facility: MEDICAL CENTER | Age: 50
End: 2025-06-30
Attending: PHYSICIAN ASSISTANT
Payer: COMMERCIAL

## 2025-06-30 LAB
BASOPHILS # BLD AUTO: 0.8 % (ref 0–1.8)
BASOPHILS # BLD: 0.07 K/UL (ref 0–0.12)
EOSINOPHIL # BLD AUTO: 0.22 K/UL (ref 0–0.51)
EOSINOPHIL NFR BLD: 2.5 % (ref 0–6.9)
ERYTHROCYTE [DISTWIDTH] IN BLOOD BY AUTOMATED COUNT: 41.3 FL (ref 35.9–50)
ESTRADIOL SERPL-MCNC: 21.5 PG/ML
HCT VFR BLD AUTO: 52.6 % (ref 42–52)
HGB BLD-MCNC: 17.8 G/DL (ref 14–18)
IMM GRANULOCYTES # BLD AUTO: 0.05 K/UL (ref 0–0.11)
IMM GRANULOCYTES NFR BLD AUTO: 0.6 % (ref 0–0.9)
LYMPHOCYTES # BLD AUTO: 1.79 K/UL (ref 1–4.8)
LYMPHOCYTES NFR BLD: 20.2 % (ref 22–41)
MCH RBC QN AUTO: 30 PG (ref 27–33)
MCHC RBC AUTO-ENTMCNC: 33.8 G/DL (ref 32.3–36.5)
MCV RBC AUTO: 88.7 FL (ref 81.4–97.8)
MONOCYTES # BLD AUTO: 1.08 K/UL (ref 0–0.85)
MONOCYTES NFR BLD AUTO: 12.2 % (ref 0–13.4)
NEUTROPHILS # BLD AUTO: 5.63 K/UL (ref 1.82–7.42)
NEUTROPHILS NFR BLD: 63.7 % (ref 44–72)
NRBC # BLD AUTO: 0 K/UL
NRBC BLD-RTO: 0 /100 WBC (ref 0–0.2)
PLATELET # BLD AUTO: 344 K/UL (ref 164–446)
PMV BLD AUTO: 9.8 FL (ref 9–12.9)
PSA SERPL DL<=0.01 NG/ML-MCNC: 1.24 NG/ML (ref 0–4)
RBC # BLD AUTO: 5.93 M/UL (ref 4.7–6.1)
TESTOST SERPL-MCNC: 293 NG/DL (ref 175–781)
WBC # BLD AUTO: 8.8 K/UL (ref 4.8–10.8)

## 2025-06-30 PROCEDURE — 36415 COLL VENOUS BLD VENIPUNCTURE: CPT

## 2025-06-30 PROCEDURE — 84403 ASSAY OF TOTAL TESTOSTERONE: CPT

## 2025-06-30 PROCEDURE — 84153 ASSAY OF PSA TOTAL: CPT

## 2025-06-30 PROCEDURE — 85025 COMPLETE CBC W/AUTO DIFF WBC: CPT

## 2025-06-30 PROCEDURE — 82670 ASSAY OF TOTAL ESTRADIOL: CPT

## 2025-08-04 ENCOUNTER — HOSPITAL ENCOUNTER (OUTPATIENT)
Facility: MEDICAL CENTER | Age: 50
End: 2025-08-04
Attending: STUDENT IN AN ORGANIZED HEALTH CARE EDUCATION/TRAINING PROGRAM
Payer: COMMERCIAL

## 2025-08-04 DIAGNOSIS — E06.3 HYPOTHYROIDISM DUE TO HASHIMOTO'S THYROIDITIS: ICD-10-CM

## 2025-08-04 DIAGNOSIS — D75.1 ERYTHROCYTOSIS: ICD-10-CM

## 2025-08-04 DIAGNOSIS — R74.8 ELEVATED LIVER ENZYMES: ICD-10-CM

## 2025-08-04 DIAGNOSIS — R76.8 ANTI-TPO ANTIBODIES PRESENT: ICD-10-CM

## 2025-08-04 LAB
ALBUMIN SERPL BCP-MCNC: 4.4 G/DL (ref 3.2–4.9)
ALBUMIN/GLOB SERPL: 1.6 G/DL
ALP SERPL-CCNC: 53 U/L (ref 30–99)
ALT SERPL-CCNC: 43 U/L (ref 2–50)
ANION GAP SERPL CALC-SCNC: 10 MMOL/L (ref 7–16)
AST SERPL-CCNC: 42 U/L (ref 12–45)
BASOPHILS # BLD AUTO: 1 % (ref 0–1.8)
BASOPHILS # BLD: 0.06 K/UL (ref 0–0.12)
BILIRUB SERPL-MCNC: 1 MG/DL (ref 0.1–1.5)
BUN SERPL-MCNC: 20 MG/DL (ref 8–22)
CALCIUM ALBUM COR SERPL-MCNC: 8.9 MG/DL (ref 8.5–10.5)
CALCIUM SERPL-MCNC: 9.2 MG/DL (ref 8.5–10.5)
CHLORIDE SERPL-SCNC: 103 MMOL/L (ref 96–112)
CO2 SERPL-SCNC: 24 MMOL/L (ref 20–33)
CREAT SERPL-MCNC: 1.3 MG/DL (ref 0.5–1.4)
EOSINOPHIL # BLD AUTO: 0.22 K/UL (ref 0–0.51)
EOSINOPHIL NFR BLD: 3.8 % (ref 0–6.9)
ERYTHROCYTE [DISTWIDTH] IN BLOOD BY AUTOMATED COUNT: 44.4 FL (ref 35.9–50)
GFR SERPLBLD CREATININE-BSD FMLA CKD-EPI: 67 ML/MIN/1.73 M 2
GLOBULIN SER CALC-MCNC: 2.8 G/DL (ref 1.9–3.5)
GLUCOSE SERPL-MCNC: 92 MG/DL (ref 65–99)
HCT VFR BLD AUTO: 54 % (ref 42–52)
HGB BLD-MCNC: 18.1 G/DL (ref 14–18)
IMM GRANULOCYTES # BLD AUTO: 0.02 K/UL (ref 0–0.11)
IMM GRANULOCYTES NFR BLD AUTO: 0.3 % (ref 0–0.9)
LYMPHOCYTES # BLD AUTO: 1.53 K/UL (ref 1–4.8)
LYMPHOCYTES NFR BLD: 26.3 % (ref 22–41)
MCH RBC QN AUTO: 30.2 PG (ref 27–33)
MCHC RBC AUTO-ENTMCNC: 33.5 G/DL (ref 32.3–36.5)
MCV RBC AUTO: 90.2 FL (ref 81.4–97.8)
MONOCYTES # BLD AUTO: 0.71 K/UL (ref 0–0.85)
MONOCYTES NFR BLD AUTO: 12.2 % (ref 0–13.4)
NEUTROPHILS # BLD AUTO: 3.27 K/UL (ref 1.82–7.42)
NEUTROPHILS NFR BLD: 56.4 % (ref 44–72)
NRBC # BLD AUTO: 0 K/UL
NRBC BLD-RTO: 0 /100 WBC (ref 0–0.2)
PLATELET # BLD AUTO: 271 K/UL (ref 164–446)
PMV BLD AUTO: 9.5 FL (ref 9–12.9)
POTASSIUM SERPL-SCNC: 4.7 MMOL/L (ref 3.6–5.5)
PROT SERPL-MCNC: 7.2 G/DL (ref 6–8.2)
RBC # BLD AUTO: 5.99 M/UL (ref 4.7–6.1)
SODIUM SERPL-SCNC: 137 MMOL/L (ref 135–145)
T4 FREE SERPL-MCNC: 1.45 NG/DL (ref 0.93–1.7)
TSH SERPL-ACNC: 2.92 UIU/ML (ref 0.38–5.33)
WBC # BLD AUTO: 5.8 K/UL (ref 4.8–10.8)

## 2025-08-04 PROCEDURE — 84443 ASSAY THYROID STIM HORMONE: CPT

## 2025-08-04 PROCEDURE — 85025 COMPLETE CBC W/AUTO DIFF WBC: CPT

## 2025-08-04 PROCEDURE — 36415 COLL VENOUS BLD VENIPUNCTURE: CPT

## 2025-08-04 PROCEDURE — 84439 ASSAY OF FREE THYROXINE: CPT

## 2025-08-04 PROCEDURE — 80053 COMPREHEN METABOLIC PANEL: CPT

## 2025-08-04 RX ORDER — LEVOTHYROXINE SODIUM 25 UG/1
25 TABLET ORAL
Qty: 25 TABLET | Refills: 0 | Status: SHIPPED | OUTPATIENT
Start: 2025-08-04 | End: 2025-08-05

## 2025-08-05 ENCOUNTER — APPOINTMENT (OUTPATIENT)
Dept: MEDICAL GROUP | Facility: MEDICAL CENTER | Age: 50
End: 2025-08-05
Payer: COMMERCIAL

## 2025-08-05 PROBLEM — G47.33 OSA (OBSTRUCTIVE SLEEP APNEA): Status: ACTIVE | Noted: 2025-08-05

## 2025-08-05 ASSESSMENT — FIBROSIS 4 INDEX: FIB4 SCORE: 1.18

## (undated) DEVICE — SENSOR OXIMETER ADULT SPO2 RD SET (20EA/BX)

## (undated) DEVICE — SUCTION INSTRUMENT YANKAUER BULBOUS TIP W/O VENT (50EA/CA)

## (undated) DEVICE — SODIUM CHL IRRIGATION 0.9% 1000ML (12EA/CA)

## (undated) DEVICE — CANISTER SUCTION RIGID RED 1500CC (40EA/CA)

## (undated) DEVICE — GOWN WARMING STANDARD FLEX - (30/CA)

## (undated) DEVICE — ELECTRODE DUAL RETURN W/ CORD - (50/PK)

## (undated) DEVICE — TOWEL STOP TIMEOUT SAFETY FLAG (40EA/CA)

## (undated) DEVICE — TUBE CONNECTING SUCTION - CLEAR PLASTIC STERILE 72 IN (50EA/CA)

## (undated) DEVICE — BAG SPONGE COUNT 10.25 X 32 - BLUE (250/CA)

## (undated) DEVICE — WATER IRRIGATION STERILE 1000ML (12EA/CA)

## (undated) DEVICE — COVER LIGHT HANDLE FLEXIBLE - SOFT (2EA/PK 80PK/CA)

## (undated) DEVICE — HUMID-VENT HEAT AND MOISTURE EXCHANGE- (50/BX)